# Patient Record
Sex: FEMALE | Race: WHITE | NOT HISPANIC OR LATINO | Employment: FULL TIME | URBAN - METROPOLITAN AREA
[De-identification: names, ages, dates, MRNs, and addresses within clinical notes are randomized per-mention and may not be internally consistent; named-entity substitution may affect disease eponyms.]

---

## 2019-01-09 ENCOUNTER — APPOINTMENT (EMERGENCY)
Dept: RADIOLOGY | Facility: HOSPITAL | Age: 24
End: 2019-01-09
Payer: COMMERCIAL

## 2019-01-09 ENCOUNTER — HOSPITAL ENCOUNTER (EMERGENCY)
Facility: HOSPITAL | Age: 24
Discharge: HOME | End: 2019-01-09
Attending: EMERGENCY MEDICINE
Payer: COMMERCIAL

## 2019-01-09 VITALS
OXYGEN SATURATION: 96 % | RESPIRATION RATE: 18 BRPM | HEART RATE: 87 BPM | SYSTOLIC BLOOD PRESSURE: 115 MMHG | DIASTOLIC BLOOD PRESSURE: 74 MMHG | WEIGHT: 194 LBS | TEMPERATURE: 98.2 F

## 2019-01-09 DIAGNOSIS — R55 NEAR SYNCOPE: ICD-10-CM

## 2019-01-09 DIAGNOSIS — R42 LIGHTHEADED: Primary | ICD-10-CM

## 2019-01-09 LAB
ANION GAP SERPL CALCULATED.3IONS-SCNC: 12 MMOL/L (ref 4–13)
BASOPHILS # BLD MANUAL: 0.09 THOUSAND/UL (ref 0–0.1)
BASOPHILS NFR MAR MANUAL: 1 % (ref 0–1)
BILIRUB UR QL STRIP: NEGATIVE
BUN SERPL-MCNC: 10 MG/DL (ref 5–25)
CALCIUM SERPL-MCNC: 8.9 MG/DL (ref 8.3–10.1)
CHLORIDE SERPL-SCNC: 99 MMOL/L (ref 100–108)
CLARITY UR: NORMAL
CO2 SERPL-SCNC: 25 MMOL/L (ref 21–32)
COLOR UR: YELLOW
CREAT SERPL-MCNC: 0.81 MG/DL (ref 0.6–1.3)
EOSINOPHIL # BLD MANUAL: 0 THOUSAND/UL (ref 0–0.4)
EOSINOPHIL NFR BLD MANUAL: 0 % (ref 0–6)
ERYTHROCYTE [DISTWIDTH] IN BLOOD BY AUTOMATED COUNT: 13 % (ref 11.6–15.1)
EXT PREG TEST URINE: NEGATIVE
GFR SERPL CREATININE-BSD FRML MDRD: 102 ML/MIN/1.73SQ M
GLUCOSE SERPL-MCNC: 106 MG/DL (ref 65–140)
GLUCOSE UR STRIP-MCNC: NEGATIVE MG/DL
HCT VFR BLD AUTO: 45.5 % (ref 34.8–46.1)
HGB BLD-MCNC: 14.4 G/DL (ref 11.5–15.4)
HGB UR QL STRIP.AUTO: NEGATIVE
KETONES UR STRIP-MCNC: NEGATIVE MG/DL
LEUKOCYTE ESTERASE UR QL STRIP: NEGATIVE
LYMPHOCYTES # BLD AUTO: 2.3 THOUSAND/UL (ref 0.6–4.47)
LYMPHOCYTES # BLD AUTO: 27 % (ref 14–44)
MCH RBC QN AUTO: 27.6 PG (ref 26.8–34.3)
MCHC RBC AUTO-ENTMCNC: 31.6 G/DL (ref 31.4–37.4)
MCV RBC AUTO: 87 FL (ref 82–98)
MONOCYTES # BLD AUTO: 0.77 THOUSAND/UL (ref 0–1.22)
MONOCYTES NFR BLD: 9 % (ref 4–12)
NEUTROPHILS # BLD MANUAL: 5.36 THOUSAND/UL (ref 1.85–7.62)
NEUTS BAND NFR BLD MANUAL: 5 % (ref 0–8)
NEUTS SEG NFR BLD AUTO: 58 % (ref 43–75)
NITRITE UR QL STRIP: NEGATIVE
NRBC BLD AUTO-RTO: 0 /100 WBCS
PH UR STRIP.AUTO: 7 [PH] (ref 5–9)
PLATELET # BLD AUTO: 340 THOUSANDS/UL (ref 149–390)
PLATELET BLD QL SMEAR: ADEQUATE
PMV BLD AUTO: 8.8 FL (ref 8.9–12.7)
POTASSIUM SERPL-SCNC: 3.3 MMOL/L (ref 3.5–5.3)
PROT UR STRIP-MCNC: NEGATIVE MG/DL
RBC # BLD AUTO: 5.21 MILLION/UL (ref 3.81–5.12)
RBC MORPH BLD: NORMAL
SODIUM SERPL-SCNC: 136 MMOL/L (ref 136–145)
SP GR UR STRIP.AUTO: 1.02 (ref 1–1.03)
TOTAL CELLS COUNTED SPEC: 100
TOXIC GRANULES BLD QL SMEAR: PRESENT
UROBILINOGEN UR QL STRIP.AUTO: 0.2 E.U./DL
WBC # BLD AUTO: 8.5 THOUSAND/UL (ref 4.31–10.16)

## 2019-01-09 PROCEDURE — 93005 ELECTROCARDIOGRAM TRACING: CPT

## 2019-01-09 PROCEDURE — 96360 HYDRATION IV INFUSION INIT: CPT

## 2019-01-09 PROCEDURE — 99284 EMERGENCY DEPT VISIT MOD MDM: CPT

## 2019-01-09 PROCEDURE — 85007 BL SMEAR W/DIFF WBC COUNT: CPT | Performed by: EMERGENCY MEDICINE

## 2019-01-09 PROCEDURE — 81003 URINALYSIS AUTO W/O SCOPE: CPT | Performed by: EMERGENCY MEDICINE

## 2019-01-09 PROCEDURE — 85027 COMPLETE CBC AUTOMATED: CPT | Performed by: EMERGENCY MEDICINE

## 2019-01-09 PROCEDURE — 36415 COLL VENOUS BLD VENIPUNCTURE: CPT | Performed by: EMERGENCY MEDICINE

## 2019-01-09 PROCEDURE — 71045 X-RAY EXAM CHEST 1 VIEW: CPT

## 2019-01-09 PROCEDURE — 81025 URINE PREGNANCY TEST: CPT | Performed by: EMERGENCY MEDICINE

## 2019-01-09 PROCEDURE — 80048 BASIC METABOLIC PNL TOTAL CA: CPT | Performed by: EMERGENCY MEDICINE

## 2019-01-09 RX ORDER — KETOROLAC TROMETHAMINE 30 MG/ML
15 INJECTION, SOLUTION INTRAMUSCULAR; INTRAVENOUS ONCE
Status: DISCONTINUED | OUTPATIENT
Start: 2019-01-09 | End: 2019-01-09

## 2019-01-09 RX ORDER — ACETAMINOPHEN 325 MG/1
650 TABLET ORAL ONCE
Status: DISCONTINUED | OUTPATIENT
Start: 2019-01-09 | End: 2019-01-09

## 2019-01-09 RX ORDER — ACETAMINOPHEN 160 MG/5ML
650 SUSPENSION, ORAL (FINAL DOSE FORM) ORAL ONCE
Status: COMPLETED | OUTPATIENT
Start: 2019-01-09 | End: 2019-01-09

## 2019-01-09 RX ADMIN — SODIUM CHLORIDE 1000 ML: 0.9 INJECTION, SOLUTION INTRAVENOUS at 18:28

## 2019-01-09 RX ADMIN — ACETAMINOPHEN 650 MG: 160 SUSPENSION ORAL at 18:47

## 2019-01-09 NOTE — ED PROCEDURE NOTE
PROCEDURE  ECG 12 Lead Documentation  Date/Time: 1/9/2019 6:58 PM  Performed by: Fawad Sullivan by: Antoine Hua     ECG reviewed by me, the ED Provider: yes    Patient location:  ED  Interpretation:     Interpretation: normal    Rate:     ECG rate:  80    ECG rate assessment: normal    Rhythm:     Rhythm: sinus rhythm    Ectopy:     Ectopy: none    Conduction:     Conduction: normal    ST segments:     ST segments:  Normal  T waves:     T waves: normal           Sid Fritz DO  01/09/19 1670

## 2019-01-10 NOTE — DISCHARGE INSTRUCTIONS
Lightheadedness   AMBULATORY CARE:   Lightheadedness  is the feeling that you may faint, but you do not  Your heartbeat may be fast or feel like it flutters  Lightheadedness may occur when you take certain medicines, such as medicine to lower your blood pressure  Dehydration, low sodium, low blood sugar, an abnormal heart rhythm, and anxiety are other common causes  Seek care immediately if:   · You have sudden chest pain  · You have trouble breathing or shortness of breath  · You have vision changes, are sweating, and have nausea while you are sitting or lying down  · You feel flushed and your heart is fluttering  · You pass out  Contact your healthcare provider if:   · You feel lightheaded often  · Your heart beats faster or slower than usual      · You have questions or concerns about your condition or care  Manage your symptoms:  Talk with your healthcare provider about these and other ways to manage your symptoms:  · Lie down  when you feel lightheaded, your throat gets tight, or your vision changes  Raise your legs above the level of your heart  · Stand up slowly  Sit on the side of the bed or couch for a few minutes before you stand up  · Take slow, deep breaths when you feel lightheaded  This can help decrease the feeling that you might faint  · Ask if you need to avoid hot baths and saunas  These may make your symptoms worse  Watch for signs of low blood sugar: These include hunger, nervousness, sweating, and fast or fluttery heartbeats  Talk with your healthcare provider about ways to keep your blood sugar level steady  Check your blood pressure often:  You should do this especially if you take medicine to lower your blood pressure  Check your blood pressure when you are lying down and when you are standing  Ask how often to check during the day  Keep a record of your blood pressure numbers   Your healthcare provider may use the record to help plan your treatment  Keep a record of your lightheadedness episodes:  Include your symptoms and your activity before and after the episode  The record can help your healthcare provider find the cause of your lightheadedness and help you manage episodes  Follow up with your healthcare provider as directed: You may need more tests to help find the cause of your lightheadedness  The tests will help healthcare providers plan the best treatment for you  Write down your questions so you remember to ask them during your visits  © 2017 [employer]  Information is for End User's use only and may not be sold, redistributed or otherwise used for commercial purposes  All illustrations and images included in CareNotes® are the copyrighted property of A [employer]  or [employer]  The above information is an  only  It is not intended as medical advice for individual conditions or treatments  Talk to your doctor, nurse or pharmacist before following any medical regimen to see if it is safe and effective for you  Near Syncope   WHAT YOU NEED TO KNOW:   Near syncope, also called presyncope, is the feeling that you may faint (lose consciousness), but you do not  You can control some health conditions that cause near syncope  Your healthcare providers can help you create a plan to manage near syncope and prevent episodes  DISCHARGE INSTRUCTIONS:   Seek care immediately if:   · You have sudden chest pain  · You have trouble breathing or shortness of breath  · You have vision changes, are sweating, and have nausea while you are sitting or lying down  · You feel dizzy or flushed and your heart is fluttering  · You lose consciousness  · You cannot use your arm, hand, foot, or leg, or it feels weak  · You have trouble speaking or understanding others when they speak  Contact your healthcare provider if:   · You have new or worsening symptoms      · Your heart beats faster or slower than usual      · You have questions or concerns about your condition or care  Follow up with your healthcare provider as directed: You may need more tests to help find the cause of your near syncope episodes  The tests will help healthcare providers plan the best treatment for you  Write down your questions so you remember to ask them during your visits  Manage near syncope:   · Sit or lie down when needed  This includes when you feel dizzy, your throat is getting tight, and your vision changes  Raise your legs above the level of your heart  · Take slow, deep breaths if you start to breathe faster with anxiety or fear  This can help decrease dizziness and the feeling that you might faint  · Keep a record of your near syncope episodes  Include your symptoms and your activity before and after the episode  The record can help your healthcare provider find the cause of your near syncope and help you manage episodes  Prevent a near syncope episode:   · Move slowly and let yourself get used to one position before you move to another position  This is very important when you change from a lying or sitting position to a standing position  Take some deep breaths before you stand up from a lying position  Stand up slowly  Sudden movements may cause a fainting spell  Sit on the side of the bed or couch for a few minutes before you stand up  If you are on bedrest, try to be upright for about 2 hours each day, or as directed  Do not lock your legs if you are standing for a long period of time  Move your legs and bend your knees to keep blood flowing  · Follow your healthcare provider's recommendations  Your provider may  recommend that you drink more liquids to prevent dehydration  You may also need to have more salt to keep your blood pressure from dropping too low and causing syncope  Your provider will tell you how much liquid and sodium to have each day  · Watch for signs of low blood sugar  These include hunger, nervousness, sweating, and fast or fluttery heartbeats  Talk with your healthcare provider about ways to keep your blood sugar level steady  · Check your blood pressure often  This is important if you take medicine to lower your blood pressure  Check your blood pressure when you are lying down and when you are standing  Ask how often to check during the day  Keep a record of your blood pressure numbers  Your healthcare provider may use the record to help plan your treatment  · Do not strain if you are constipated  You may faint if you strain to have a bowel movement  Walking is the best way to get your bowels moving  Eat foods high in fiber to make it easier to have a bowel movement  Good examples are high-fiber cereals, beans, vegetables, and whole-grain breads  Prune juice may help make bowel movements softer  · Do not exercise outside on a hot day  The combination of physical activity and heat can lead to dehydration  This can cause syncope  © 2017 [employer]  Information is for End User's use only and may not be sold, redistributed or otherwise used for commercial purposes  All illustrations and images included in CareNotes® are the copyrighted property of A [employer]  or Faizan Potter  The above information is an  only  It is not intended as medical advice for individual conditions or treatments  Talk to your doctor, nurse or pharmacist before following any medical regimen to see if it is safe and effective for you

## 2019-01-11 NOTE — ED PROVIDER NOTES
History  Chief Complaint   Patient presents with    Dizziness     being tx  for bronchitis  states she was driving and she became dizzy and thought she was going to  pass out     Patient presents after near syncopal episode  States she was driving and became very lightheaded and felt as if she was going to pass out  States room was not spinning  Pulled over and sister came to take her to hospital  Starting to feel better  States being treated with Zithromax for bronchitis  No LOC  History provided by:  Patient   used: No    Dizziness       None       History reviewed  No pertinent past medical history  Past Surgical History:   Procedure Laterality Date    FRACTURE SURGERY         History reviewed  No pertinent family history  I have reviewed and agree with the history as documented  Social History   Substance Use Topics    Smoking status: Never Smoker    Smokeless tobacco: Never Used    Alcohol use No        Review of Systems   Neurological: Positive for dizziness  All other systems reviewed and are negative  Physical Exam  Physical Exam   Constitutional: She is oriented to person, place, and time  Patient crying visibly upset    HENT:   Mouth/Throat: Oropharynx is clear and moist    Eyes: Pupils are equal, round, and reactive to light  EOM are normal    Neck: Normal range of motion  Neck supple  Cardiovascular: Normal rate, regular rhythm and intact distal pulses  Pulmonary/Chest: Effort normal and breath sounds normal  No respiratory distress  Abdominal: Soft  There is no tenderness  Musculoskeletal: Normal range of motion  Neurological: She is alert and oriented to person, place, and time  No cranial nerve deficit or sensory deficit  She exhibits normal muscle tone  Coordination normal    Skin: Capillary refill takes less than 2 seconds  She is not diaphoretic  Nursing note and vitals reviewed        Vital Signs  ED Triage Vitals   Temperature Pulse Respirations Blood Pressure SpO2   01/09/19 1820 01/09/19 1820 01/09/19 1820 01/09/19 1820 01/09/19 1820   98 2 °F (36 8 °C) 84 22 136/84 96 %      Temp src Heart Rate Source Patient Position - Orthostatic VS BP Location FiO2 (%)   -- 01/09/19 1913 01/09/19 1913 01/09/19 1913 --    Monitor Lying Right arm       Pain Score       01/09/19 1820       7           Vitals:    01/09/19 1820 01/09/19 1913   BP: 136/84 115/74   Pulse: 84 87   Patient Position - Orthostatic VS:  Lying       Visual Acuity      ED Medications  Medications   sodium chloride 0 9 % bolus 1,000 mL (0 mL Intravenous Stopped 1/9/19 1950)   acetaminophen (TYLENOL) oral suspension 650 mg (650 mg Oral Given 1/9/19 1847)       Diagnostic Studies  Results Reviewed     Procedure Component Value Units Date/Time    CBC and differential [[id number]]  (Abnormal) Collected:  01/09/19 1842    Lab Status:  Final result Specimen:  Blood from Arm, Right Updated:  01/09/19 1913     WBC 8 50 Thousand/uL      RBC 5 21 (H) Million/uL      Hemoglobin 14 4 g/dL      Hematocrit 45 5 %      MCV 87 fL      MCH 27 6 pg      MCHC 31 6 g/dL      RDW 13 0 %      MPV 8 8 (L) fL      Platelets 230 Thousands/uL      nRBC 0 /100 WBCs     Narrative: This is an appended report  These results have been appended to a previously verified report      Basic metabolic panel [[id number]]  (Abnormal) Collected:  01/09/19 1842    Lab Status:  Final result Specimen:  Blood from Arm, Right Updated:  01/09/19 1859     Sodium 136 mmol/L      Potassium 3 3 (L) mmol/L      Chloride 99 (L) mmol/L      CO2 25 mmol/L      ANION GAP 12 mmol/L      BUN 10 mg/dL      Creatinine 0 81 mg/dL      Glucose 106 mg/dL      Calcium 8 9 mg/dL      eGFR 102 ml/min/1 73sq m     Narrative:         National Kidney Disease Education Program recommendations are as follows:  GFR calculation is accurate only with a steady state creatinine  Chronic Kidney disease less than 60 ml/min/1 73 sq  meters  Kidney failure less than 15 ml/min/1 73 sq  meters  UA w Reflex to Microscopic [[id number]] Collected:  01/09/19 1843    Lab Status:  Final result Specimen:  Urine from Urine, Clean Catch Updated:  01/09/19 1850     Color, UA Yellow     Clarity, UA Slightly Cloudy     Specific [place], UA 1 020     pH, UA 7 0     Leukocytes, UA Negative     Nitrite, UA Negative     Protein, UA Negative mg/dl      Glucose, UA Negative mg/dl      Ketones, UA Negative mg/dl      Urobilinogen, UA 0 2 E U /dl      Bilirubin, UA Negative     Blood, UA Negative    POCT pregnancy, urine [[id number]]  (Normal) Resulted:  01/09/19 1846    Lab Status:  Final result Updated:  01/09/19 1846     EXT PREG TEST UR (Ref: Negative) negative                 XR chest 1 view portable   Final Result by Jazmin Holm MD (01/10 9494)      No active pulmonary disease on examination which is somewhat limited secondary to low lung volumes  Workstation performed: [id number]                    Procedures  Procedures       Phone Contacts  ED Phone Contact    ED Course                               MDM  Number of Diagnoses or Management Options  Lightheaded:   Near syncope:   Diagnosis management comments: Pulse ox 96% on RA indicating adequate oxygenation  CXR: NAD as read by me    Patient felt better on re-exam and was discharged home to follow up as outpatient          Amount and/or Complexity of Data Reviewed  Clinical lab tests: ordered and reviewed  Tests in the radiology section of CPT®: ordered and reviewed  Decide to obtain previous medical records or to obtain history from someone other than the patient: yes  Review and summarize past medical records: yes  Independent visualization of images, tracings, or specimens: yes    Patient Progress  Patient progress: stable    CritCare Time    Disposition  Final diagnoses:   Lightheaded   Near syncope     Time reflects when diagnosis was documented in both MDM as applicable and the Disposition within this note Time User Action Codes Description Comment    1/9/2019  7:35 PM Emma Ledbetter Add [R42] Lightheaded     1/9/2019  7:35 PM Luis Jean-Baptiste Add [R55] Near syncope       ED Disposition     ED Disposition Condition Comment    Discharge  Juanviki Vanda discharge to home/self care  Condition at discharge: stable      Follow-up Information     Follow up With Specialties Details [facility] [address]  In 3 days  [phone]            There are no discharge medications for this patient  No discharge procedures on file      ED Provider  Electronically Signed by           Funmilayo Malave DO  01/10/19 9128

## 2019-01-13 LAB
ATRIAL RATE: 80 BPM
P AXIS: 24 DEGREES
PR INTERVAL: 148 MS
QRS AXIS: 6 DEGREES
QRSD INTERVAL: 84 MS
QT INTERVAL: 378 MS
QTC INTERVAL: 435 MS
T WAVE AXIS: 31 DEGREES
VENTRICULAR RATE: 80 BPM

## 2019-01-13 PROCEDURE — 93010 ELECTROCARDIOGRAM REPORT: CPT | Performed by: INTERNAL MEDICINE

## 2019-05-02 ENCOUNTER — HOSPITAL ENCOUNTER (EMERGENCY)
Facility: HOSPITAL | Age: 24
Discharge: HOME | End: 2019-05-02
Attending: EMERGENCY MEDICINE | Admitting: EMERGENCY MEDICINE

## 2019-05-02 VITALS
WEIGHT: 195 LBS | HEART RATE: 97 BPM | OXYGEN SATURATION: 100 % | RESPIRATION RATE: 18 BRPM | DIASTOLIC BLOOD PRESSURE: 74 MMHG | TEMPERATURE: 99.2 F | SYSTOLIC BLOOD PRESSURE: 142 MMHG

## 2019-05-02 DIAGNOSIS — L30.9 DERMATITIS: Primary | ICD-10-CM

## 2019-05-02 PROCEDURE — 99282 EMERGENCY DEPT VISIT SF MDM: CPT

## 2019-05-02 RX ORDER — METHYLPREDNISOLONE 4 MG/1
TABLET ORAL
Qty: 21 TABLET | Refills: 0 | Status: SHIPPED | OUTPATIENT
Start: 2019-05-02 | End: 2019-08-14 | Stop reason: ALTCHOICE

## 2019-05-02 RX ORDER — CETIRIZINE HYDROCHLORIDE 10 MG/1
10 TABLET ORAL DAILY
Qty: 30 TABLET | Refills: 0 | Status: SHIPPED | OUTPATIENT
Start: 2019-05-02 | End: 2019-08-14 | Stop reason: ALTCHOICE

## 2019-05-02 RX ADMIN — PREDNISONE 50 MG: 20 TABLET ORAL at 15:34

## 2019-05-03 ENCOUNTER — OFFICE VISIT (OUTPATIENT)
Dept: FAMILY MEDICINE CLINIC | Facility: CLINIC | Age: 24
End: 2019-05-03

## 2019-05-03 VITALS
DIASTOLIC BLOOD PRESSURE: 70 MMHG | TEMPERATURE: 98 F | HEART RATE: 60 BPM | WEIGHT: 203 LBS | SYSTOLIC BLOOD PRESSURE: 112 MMHG | RESPIRATION RATE: 18 BRPM | OXYGEN SATURATION: 98 %

## 2019-05-03 DIAGNOSIS — R21 FACIAL RASH: Primary | ICD-10-CM

## 2019-05-03 PROCEDURE — 99213 OFFICE O/P EST LOW 20 MIN: CPT | Performed by: FAMILY MEDICINE

## 2019-05-03 RX ORDER — LORATADINE 10 MG/1
10 TABLET ORAL DAILY
COMMUNITY
End: 2019-08-14 | Stop reason: ALTCHOICE

## 2019-08-14 ENCOUNTER — OFFICE VISIT (OUTPATIENT)
Dept: FAMILY MEDICINE CLINIC | Facility: CLINIC | Age: 24
End: 2019-08-14

## 2019-08-14 VITALS
SYSTOLIC BLOOD PRESSURE: 112 MMHG | HEART RATE: 84 BPM | HEIGHT: 65 IN | RESPIRATION RATE: 16 BRPM | TEMPERATURE: 98.8 F | DIASTOLIC BLOOD PRESSURE: 80 MMHG | WEIGHT: 199 LBS | BODY MASS INDEX: 33.15 KG/M2

## 2019-08-14 DIAGNOSIS — R05.9 COUGH: ICD-10-CM

## 2019-08-14 DIAGNOSIS — R10.32 LLQ PAIN: Primary | ICD-10-CM

## 2019-08-14 DIAGNOSIS — Z13.6 SCREENING FOR CARDIOVASCULAR, RESPIRATORY, AND GENITOURINARY DISEASES: ICD-10-CM

## 2019-08-14 DIAGNOSIS — Z13.1 SCREENING FOR DIABETES MELLITUS (DM): ICD-10-CM

## 2019-08-14 DIAGNOSIS — Z13.89 SCREENING FOR CARDIOVASCULAR, RESPIRATORY, AND GENITOURINARY DISEASES: ICD-10-CM

## 2019-08-14 DIAGNOSIS — J30.9 ALLERGIC RHINITIS, UNSPECIFIED SEASONALITY, UNSPECIFIED TRIGGER: ICD-10-CM

## 2019-08-14 DIAGNOSIS — Z13.83 SCREENING FOR CARDIOVASCULAR, RESPIRATORY, AND GENITOURINARY DISEASES: ICD-10-CM

## 2019-08-14 PROCEDURE — 99214 OFFICE O/P EST MOD 30 MIN: CPT | Performed by: FAMILY MEDICINE

## 2019-08-14 PROCEDURE — 3725F SCREEN DEPRESSION PERFORMED: CPT | Performed by: FAMILY MEDICINE

## 2019-08-14 NOTE — PROGRESS NOTES
Assessment/Plan:    No problem-specific Assessment & Plan notes found for this encounter  Could recommend ENT for chronic cough after seeing Allergist, appt pending  BMI Counseling: Body mass index is 33 12 kg/m²  Discussed the patient's BMI with her  The BMI is above average  BMI counseling and education was provided to the patient  Nutrition recommendations include decreasing overall calorie intake  Suggest daily h1b and possibly INS or LTRA in future  Possible gerd trigger, pt to observe for gerd sx    llq pain  Around menopause  First occurrence last cycle  Consider endometriosis or adnexal pathology  US ordered, schedule around next period if possible  Gyn eval pending    phm labs offered  F/u 1m     Diagnoses and all orders for this visit:    LLQ pain  -     US abdomen and pelvis with transvaginal; Future    Allergic rhinitis, unspecified seasonality, unspecified trigger  -     Ambulatory Referral to Otolaryngology; Future    Screening for cardiovascular, respiratory, and genitourinary diseases  -     Lipid Panel with Direct LDL reflex; Future  -     Lipid Panel with Direct LDL reflex    Screening for diabetes mellitus (DM)  -     Comprehensive metabolic panel; Future  -     Comprehensive metabolic panel    Cough  -     Ambulatory Referral to Otolaryngology; Future      Return in about 1 month (around 9/14/2019) for Recheck  Subjective:      Patient ID: Myriam Mari is a 21 y o  female      Chief Complaint   Patient presents with    New patient     establish care    Cough     persistent cough- started last august    Cramping after period     Left side       HPI    New pt here, been to [facility]    On/off cough 1y  Been to pcp in [place], dx with bronchitis  Cough for 1w then gone for a month at times  Mucus is yellow to clear at times  Hx of seasonal allergies  Most recent episode of cough past 2d  No fever  Non productive  Used to take alavert in past  No sprays in past    Nonsmoker    May wheeze  Cat allergy, lives with cats in sisters house  Going to see an allergist today in [facility], Dr Obed Brenner    Rash from peanut butter, facial    No hx anaphylaxis in past    Has gyn appt in September  No prior abnormal pap  No bcp  Sexually active  No condom  Not     No gerd sx per pt    crampy pain  llq  Around time of periods  Before but sometimes after  No blood in stool  Periods not heavy    The following portions of the patient's history were reviewed and updated as appropriate: allergies, current medications, past family history, past medical history, past social history, past surgical history and problem list     Review of Systems   Constitutional: Negative for chills and fever  Gastrointestinal: Positive for abdominal distention  Negative for blood in stool  Genitourinary: Negative for dysuria  No current outpatient medications on file  No current facility-administered medications for this visit  Objective:    /80   Pulse 84   Temp 98 8 °F (37 1 °C)   Resp 16   Ht 5' 5" (1 651 m)   Wt 90 3 kg (199 lb)   LMP 07/27/2019   BMI 33 12 kg/m²        Physical Exam   Constitutional: She appears well-developed  No distress  HENT:   Head: Normocephalic  Right Ear: External ear normal    Left Ear: External ear normal    Mouth/Throat: Oropharynx is clear and moist  No oropharyngeal exudate  Eyes: Conjunctivae are normal  No scleral icterus  Neck: Neck supple  Cardiovascular: Normal rate, regular rhythm, normal heart sounds and intact distal pulses  Pulmonary/Chest: Effort normal  No respiratory distress  She has no wheezes  She has no rales  Abdominal: Soft  Bowel sounds are normal  She exhibits no distension and no mass  There is tenderness  There is no rebound and no guarding  Musculoskeletal: She exhibits no edema or deformity  Lymphadenopathy:     She has no cervical adenopathy  Neurological: She is alert  She exhibits normal muscle tone     Skin: Skin is warm and dry  No rash noted  No pallor  Psychiatric: Her behavior is normal  Thought content normal    Nursing note and vitals reviewed               Johnie Ward DO

## 2019-08-22 ENCOUNTER — OFFICE VISIT (OUTPATIENT)
Dept: FAMILY MEDICINE CLINIC | Facility: CLINIC | Age: 24
End: 2019-08-22

## 2019-08-22 VITALS
HEART RATE: 60 BPM | TEMPERATURE: 98.9 F | SYSTOLIC BLOOD PRESSURE: 122 MMHG | HEIGHT: 65 IN | WEIGHT: 203 LBS | BODY MASS INDEX: 33.82 KG/M2 | DIASTOLIC BLOOD PRESSURE: 84 MMHG | RESPIRATION RATE: 16 BRPM

## 2019-08-22 DIAGNOSIS — J35.8 TONSILLOLITH: Primary | ICD-10-CM

## 2019-08-22 PROCEDURE — 99213 OFFICE O/P EST LOW 20 MIN: CPT | Performed by: FAMILY MEDICINE

## 2019-08-22 PROCEDURE — 1036F TOBACCO NON-USER: CPT | Performed by: FAMILY MEDICINE

## 2019-08-22 PROCEDURE — 3008F BODY MASS INDEX DOCD: CPT | Performed by: FAMILY MEDICINE

## 2019-08-22 NOTE — PROGRESS NOTES
Assessment/Plan:   Diagnoses and all orders for this visit:    Tonsilloliths  - Reviewed supportive care with patient including salt water gargles, throat lozenges, warm teas  Rapid strep done in office today is negative  Pt does have an appointment with ENT scheduled mid September  Did let her know that symptoms should resolve on their own  Return if development of fevers, chills, worsening pain, difficulty swallowing  Subjective:      Patient ID: Tim Melo is a 21 y o  female  Sore Throat    This is a new problem  The current episode started yesterday  The problem has been unchanged  The pain is worse on the right side  There has been no fever  The pain is at a severity of 3/10  Associated symptoms include coughing  Pertinent negatives include no congestion, diarrhea, drooling, ear discharge, ear pain, headaches, hoarse voice, plugged ear sensation, neck pain, shortness of breath, stridor, swollen glands, trouble swallowing or vomiting  She has had no exposure to strep or mono  She has tried nothing for the symptoms  The following portions of the patient's history were reviewed and updated as appropriate: allergies, current medications, past family history, past medical history, past social history, past surgical history and problem list     Review of Systems   HENT: Positive for sore throat  Negative for congestion, drooling, ear discharge, ear pain, hoarse voice and trouble swallowing  Respiratory: Positive for cough  Negative for shortness of breath and stridor  Gastrointestinal: Negative for diarrhea and vomiting  Musculoskeletal: Negative for neck pain  Neurological: Negative for headaches  Objective:      /84   Pulse 60   Temp 98 9 °F (37 2 °C)   Resp 16   Ht 5' 5" (1 651 m)   Wt 92 1 kg (203 lb)   LMP 07/27/2019 (Exact Date)   BMI 33 78 kg/m²          Physical Exam   Constitutional: She is oriented to person, place, and time   She appears well-developed and well-nourished  Non-toxic appearance  She does not appear ill  No distress  HENT:   Head: Normocephalic and atraumatic  Right Ear: Hearing, tympanic membrane and ear canal normal    Left Ear: Hearing, tympanic membrane and ear canal normal    Mouth/Throat: Uvula is midline, oropharynx is clear and moist and mucous membranes are normal  Tonsils are 0 on the right  Tonsils are 0 on the left  3 calcifications on right tonsil resembling tonsilloliths  Neck: Normal range of motion  Neck supple  No thyromegaly present  Lymphadenopathy:     She has no cervical adenopathy  Neurological: She is alert and oriented to person, place, and time  Psychiatric: She has a normal mood and affect   Her behavior is normal

## 2019-08-28 ENCOUNTER — HOSPITAL ENCOUNTER (OUTPATIENT)
Dept: RADIOLOGY | Facility: HOSPITAL | Age: 24
Discharge: HOME | End: 2019-08-28

## 2019-08-28 DIAGNOSIS — R10.32 LLQ PAIN: ICD-10-CM

## 2019-08-28 PROCEDURE — 76856 US EXAM PELVIC COMPLETE: CPT

## 2019-08-28 PROCEDURE — 76830 TRANSVAGINAL US NON-OB: CPT

## 2019-08-29 ENCOUNTER — TELEPHONE (OUTPATIENT)
Dept: FAMILY MEDICINE CLINIC | Facility: CLINIC | Age: 24
End: 2019-08-29

## 2019-08-29 DIAGNOSIS — R93.5 ABNORMAL ULTRASOUND OF OVARY: Primary | ICD-10-CM

## 2019-09-09 NOTE — PROGRESS NOTES
Assessment/Plan:     Reviewed pelvic US with patient  Recommend follow up [address] as planned  Return to office for well woman exam  Start daily MVI with folic acid or prenatal vitamin to decrease risk of neural tube defect in a pregnancy  If birth control is desired, return to the office  Diagnoses and all orders for this visit:    Cyst of left ovary              Subjective:      Patient ID: Cruz Duarte is a 21 y o  female  Cruz Duarte is a 21 y o  female who is here today as a new patient  for a problem visit  She started with "stomach pain" that occurred with her menses in July and lasted x 1 weeks  She followed with her PCP who ordered a pelvic US  Pelvic US on 8/28/19 showed 1 5 cm heterogeneous isoechoic nodule or complex cyst in the left ovary  Recommmend follow up US in 6-12 weeks  She has this schedule for 10/11/19  Monthly menses x 4-5 days with mod flow  Menses is acceptable  Denies pelvic pain today  Worsens with menses and rates pain 7/10  Tylenol is effective  Cruz Duarte is sexually active with male partner of 2 years  No condom or any birth control used  Pregnancy acceptable  Last gyn visit was 2017  The following portions of the patient's history were reviewed and updated as appropriate: allergies, current medications, past family history, past medical history, past social history, past surgical history and problem list     Review of Systems   Constitutional: Negative  HENT: Negative for sore throat and trouble swallowing  Gastrointestinal: Negative for abdominal pain, constipation, diarrhea, nausea and vomiting  Genitourinary: Positive for menstrual problem (intermittent dysmenorrhea) and pelvic pain (intermittent; none now)  Negative for decreased urine volume, difficulty urinating, dyspareunia, dysuria, frequency, genital sores, urgency, vaginal bleeding, vaginal discharge and vaginal pain  Musculoskeletal: Negative for arthralgias and myalgias  Skin: Negative  Hematological: Negative for adenopathy  Psychiatric/Behavioral: Negative  All other systems reviewed and are negative  Objective:      /62 (BP Location: Left arm, Patient Position: Sitting, Cuff Size: Standard)   Pulse 64   Ht 5' 5" (1 651 m)   Wt 90 kg (198 lb 6 4 oz)   LMP 09/02/2019   BMI 33 02 kg/m²          Physical Exam   Constitutional: She is oriented to person, place, and time  She appears well-developed and well-nourished  Genitourinary: Vagina normal and uterus normal  Rectal exam shows no external hemorrhoid  Pelvic exam was performed with patient supine  No labial fusion  There is no rash, tenderness, lesion or injury on the right labia  There is no rash, tenderness, lesion or injury on the left labia  Cervix exhibits no motion tenderness, no discharge and no friability  Right adnexum displays no mass, no tenderness and no fullness  Left adnexum displays no mass, no tenderness and no fullness  Musculoskeletal: Normal range of motion  Lymphadenopathy: No inguinal adenopathy noted on the right or left side  Right: No inguinal adenopathy present  Left: No inguinal adenopathy present  Neurological: She is alert and oriented to person, place, and time  Skin: Skin is warm and dry  Psychiatric: She has a normal mood and affect  Nursing note and vitals reviewed

## 2019-09-10 ENCOUNTER — OFFICE VISIT (OUTPATIENT)
Dept: OBGYN CLINIC | Facility: CLINIC | Age: 24
End: 2019-09-10
Payer: COMMERCIAL

## 2019-09-10 VITALS
BODY MASS INDEX: 33.05 KG/M2 | HEART RATE: 64 BPM | DIASTOLIC BLOOD PRESSURE: 62 MMHG | SYSTOLIC BLOOD PRESSURE: 100 MMHG | WEIGHT: 198.4 LBS | HEIGHT: 65 IN

## 2019-09-10 DIAGNOSIS — N83.202 CYST OF LEFT OVARY: Primary | ICD-10-CM

## 2019-09-10 PROCEDURE — 99203 OFFICE O/P NEW LOW 30 MIN: CPT | Performed by: NURSE PRACTITIONER

## 2019-09-10 NOTE — PATIENT INSTRUCTIONS
Reviewed pelvic US with patient  Recommend follow up [address] as planned  Return to office for well woman exam  Start daily MVI with folic acid or prenatal vitamin to decrease risk of neural tube defect in a pregnancy  If birth control is desired, return to the office

## 2019-09-25 ENCOUNTER — TELEPHONE (OUTPATIENT)
Dept: FAMILY MEDICINE CLINIC | Facility: CLINIC | Age: 24
End: 2019-09-25

## 2019-09-25 ENCOUNTER — OFFICE VISIT (OUTPATIENT)
Dept: FAMILY MEDICINE CLINIC | Facility: CLINIC | Age: 24
End: 2019-09-25
Payer: COMMERCIAL

## 2019-09-25 VITALS
DIASTOLIC BLOOD PRESSURE: 70 MMHG | WEIGHT: 199 LBS | SYSTOLIC BLOOD PRESSURE: 110 MMHG | RESPIRATION RATE: 16 BRPM | HEIGHT: 65 IN | TEMPERATURE: 97.8 F | BODY MASS INDEX: 33.15 KG/M2 | HEART RATE: 56 BPM

## 2019-09-25 DIAGNOSIS — J06.9 UPPER RESPIRATORY TRACT INFECTION, UNSPECIFIED TYPE: ICD-10-CM

## 2019-09-25 DIAGNOSIS — M79.89 SWELLING OF LOWER EXTREMITY: Primary | ICD-10-CM

## 2019-09-25 DIAGNOSIS — J02.9 SORE THROAT: ICD-10-CM

## 2019-09-25 PROCEDURE — 3008F BODY MASS INDEX DOCD: CPT | Performed by: FAMILY MEDICINE

## 2019-09-25 PROCEDURE — 99213 OFFICE O/P EST LOW 20 MIN: CPT | Performed by: FAMILY MEDICINE

## 2019-09-25 RX ORDER — LIDOCAINE HYDROCHLORIDE 20 MG/ML
15 SOLUTION OROPHARYNGEAL 4 TIMES DAILY PRN
Qty: 100 ML | Refills: 0 | Status: SHIPPED | OUTPATIENT
Start: 2019-09-25 | End: 2020-02-05

## 2019-09-25 NOTE — TELEPHONE ENCOUNTER
Klein Geo called back and she stated that she think its developing to a sinus infection because she is coughing and sneezing yellow mucus  I stated that Dr Anahi Avelar is not here this evening and that when she is back in the morning she will address accordingly if the patient will need to come back or if she will e-scribe an ABX    Please advise  Jacob Sharma MA

## 2019-09-25 NOTE — PROGRESS NOTES
Assessment/Plan:     Diagnoses and all orders for this visit:    Swelling of lower extremity        - Measured both calves and lower extremities today and there is no significant swelling in either extremity  There is no pain or discoloration in lower extremities as well  Given that pt has no major risk factors at this time for DVTs (other than her father having a blood clot), and the swelling only occurs when pt is on her feet for too long during the day and disappears when she elevates her legs, will hold off on lower extremity dopplers  Did let pt know that if she develops worsening swelling, pain in her legs, SOB, chest tightness, palpitations to go to the ED or return here for further work up  Counseled on leg elevation, compression stockings and exercises for lower extremity swelling  Sore throat  -     Lidocaine Viscous HCl (XYLOCAINE) 2 % mucosal solution; Swish and spit 15 mL 4 (four) times a day as needed for mild pain    Upper respiratory tract infection, unspecified type        - Reviewed supportive care with patient including rest, staying hydrated (drink 8-10 glasses of liquids such as water, ginger ale, juice), salt water gargles, saline nasal drops, use of humidifier, frequent handwashing to prevent spread of germs, and use of over the counter decongestants, cough suppressants, Ibuprofen/Acetaminophen        Subjective:      Patient ID: Dunia Saab is a 21 y o  female  HPI  Rajesh Canales presents today for multiple issues including swelling of her right lower extremity as well as URI symptoms  Leg Swelling: Pt states that for the past week or so she has noted on and off swelling of her right foot and leg below the knee  States it occurs mostly when she is standing on her feet at work all day  The swelling goes away when she goes home and elevates her legs  Denies any pain or redness in the area   Denies any hx of blood clots, medication use, recent travel, shortness of breath, chest tightness, palpitations, leg cramps  Does have a family hx of blood clot in her father  Pt states that she does not currently have the swelling because it goes away when she is not standing on her feet all day  URI Symptoms: Pt reports 1 day hx of nasal congestion, sore throat, chills  Denies fevers  No sick contacts  The following portions of the patient's history were reviewed and updated as appropriate: allergies, current medications, past family history, past medical history, past social history, past surgical history and problem list     Review of Systems   Constitutional: Positive for activity change and chills  Negative for appetite change, diaphoresis, fatigue and fever  HENT: Positive for congestion and sore throat  Respiratory: Negative for cough, choking, chest tightness, shortness of breath and wheezing  Cardiovascular: Positive for leg swelling (on and off)  Negative for chest pain and palpitations  Gastrointestinal: Negative for abdominal distention, abdominal pain, constipation, diarrhea, nausea and vomiting  Genitourinary: Negative for difficulty urinating, dyspareunia, dysuria, enuresis, flank pain, frequency, menstrual problem, pelvic pain and urgency  Musculoskeletal: Negative for arthralgias, back pain, gait problem, joint swelling, myalgias, neck pain and neck stiffness  Skin: Negative  Neurological: Negative for dizziness, tremors, syncope, facial asymmetry, weakness, light-headedness, numbness and headaches  Psychiatric/Behavioral: Negative  Objective:      /70   Pulse 56   Temp 97 8 °F (36 6 °C)   Resp 16   Ht 5' 5" (1 651 m)   Wt 90 3 kg (199 lb)   LMP 09/02/2019   BMI 33 12 kg/m²          Physical Exam   Constitutional: She is oriented to person, place, and time  She appears well-developed and well-nourished  No distress  HENT:   Head: Normocephalic and atraumatic  Neck: Normal range of motion  Neck supple     Cardiovascular: Normal rate, regular rhythm, normal heart sounds and intact distal pulses  Exam reveals no gallop and no friction rub  No murmur heard  Pulmonary/Chest: Effort normal and breath sounds normal  No respiratory distress  She has no wheezes  She has no rales  She exhibits no tenderness  Musculoskeletal: Normal range of motion  She exhibits no edema (both calves measured, left calf slightly larger than right, but no swelling), tenderness or deformity  Neurological: She is alert and oriented to person, place, and time  Skin: Skin is warm and dry  No rash noted  She is not diaphoretic  No erythema  No pallor

## 2019-09-25 NOTE — TELEPHONE ENCOUNTER
Pt saw Dr Gabriella Sanchez today for a cold  She just called back stating she is feeling worse and thinks she has a sinus infection  Pls call her to advise

## 2019-09-26 DIAGNOSIS — J01.90 ACUTE NON-RECURRENT SINUSITIS, UNSPECIFIED LOCATION: Primary | ICD-10-CM

## 2019-09-26 RX ORDER — AZITHROMYCIN 250 MG/1
TABLET, FILM COATED ORAL
Qty: 6 TABLET | Refills: 0 | Status: SHIPPED | OUTPATIENT
Start: 2019-09-26 | End: 2019-10-01

## 2019-09-26 NOTE — TELEPHONE ENCOUNTER
I sent over azithromycin to her pharmacy  Tried calling her to let her know, but no answer  Thank you

## 2019-09-27 ENCOUNTER — TELEPHONE (OUTPATIENT)
Dept: FAMILY MEDICINE CLINIC | Facility: CLINIC | Age: 24
End: 2019-09-27

## 2019-09-27 NOTE — TELEPHONE ENCOUNTER
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

## 2019-10-07 ENCOUNTER — TELEPHONE (OUTPATIENT)
Dept: OBGYN CLINIC | Facility: CLINIC | Age: 24
End: 2019-10-07

## 2019-10-07 NOTE — TELEPHONE ENCOUNTER
Patient left message on nurse line states she has left ovarian cyst and since Wednesday she has notice pink spotting with wiping after using bathroom  RC left message for patient to call the office

## 2019-10-08 NOTE — TELEPHONE ENCOUNTER
Spoke with patient who had light spotting x 5-6 days that was self limiting  None today  Denies pelvic pain, abnormal vaginal bleeding or dysuria  No insurance currently and declined an office visit  She will reconsider if the symptoms reoccur

## 2019-11-21 ENCOUNTER — TRANSCRIBE ORDERS (OUTPATIENT)
Dept: RADIOLOGY | Facility: HOSPITAL | Age: 24
End: 2019-11-21

## 2019-11-21 ENCOUNTER — HOSPITAL ENCOUNTER (OUTPATIENT)
Dept: RADIOLOGY | Facility: HOSPITAL | Age: 24
Discharge: HOME | End: 2019-11-21
Payer: COMMERCIAL

## 2019-11-21 DIAGNOSIS — R93.5 ABNORMAL ULTRASOUND OF OVARY: ICD-10-CM

## 2019-11-21 PROCEDURE — 76856 US EXAM PELVIC COMPLETE: CPT

## 2019-11-21 PROCEDURE — 76830 TRANSVAGINAL US NON-OB: CPT

## 2019-11-26 DIAGNOSIS — R93.5 ABNORMAL ULTRASOUND OF OVARY: Primary | ICD-10-CM

## 2019-12-03 ENCOUNTER — TELEPHONE (OUTPATIENT)
Dept: HEMATOLOGY ONCOLOGY | Facility: CLINIC | Age: 24
End: 2019-12-03

## 2019-12-03 NOTE — TELEPHONE ENCOUNTER
New Patient Encounter    New Patient Intake Form   Patient Details:  Kelby Zheng  1995  [id number]    Background Information:  [address] starts by opening a telephone encounter and gathering the following information   Who is calling to schedule? If not self, relationship to patient? self   Referring Provider self   What is the diagnosis? Ovarian cyst   When was the diagnosis? 8/2019   Is patient aware of diagnosis? Yes   Reason for visit? NP DX   Have you had any testing done? If so: when, where? Yes   Are records in [facility]? yes   Was the patient told to bring a disk? no   Scheduling Information:   Preferred [place]: Mary Roe     Requesting Specific Provider? no   Are there any dates/time the patient cannot be seen? Miscellaneous: family h/o ovarian CA   After completing the above information, please route to Financial Counselor and the appropriate Nurse Navigator for review

## 2019-12-04 ENCOUNTER — TELEPHONE (OUTPATIENT)
Dept: HEMATOLOGY ONCOLOGY | Facility: CLINIC | Age: 24
End: 2019-12-04

## 2019-12-04 NOTE — TELEPHONE ENCOUNTER
Patient cancelled appointment on accident  Was able to give her same appointment back  Patient voiced understanding

## 2019-12-31 ENCOUNTER — TELEPHONE (OUTPATIENT)
Dept: FAMILY MEDICINE CLINIC | Facility: CLINIC | Age: 24
End: 2019-12-31

## 2020-01-16 ENCOUNTER — OFFICE VISIT (OUTPATIENT)
Dept: GYNECOLOGIC ONCOLOGY | Facility: CLINIC | Age: 25
End: 2020-01-16

## 2020-01-16 ENCOUNTER — TELEPHONE (OUTPATIENT)
Dept: GYNECOLOGIC ONCOLOGY | Facility: CLINIC | Age: 25
End: 2020-01-16

## 2020-01-16 VITALS
BODY MASS INDEX: 32.99 KG/M2 | WEIGHT: 198 LBS | TEMPERATURE: 98.8 F | DIASTOLIC BLOOD PRESSURE: 70 MMHG | SYSTOLIC BLOOD PRESSURE: 108 MMHG | HEART RATE: 68 BPM | HEIGHT: 65 IN

## 2020-01-16 DIAGNOSIS — N83.8 OVARIAN MASS, LEFT: Primary | ICD-10-CM

## 2020-01-16 PROBLEM — N83.202 CYST OF LEFT OVARY: Status: RESOLVED | Noted: 2019-09-10 | Resolved: 2020-01-16

## 2020-01-16 PROCEDURE — 99242 OFF/OP CONSLTJ NEW/EST SF 20: CPT | Performed by: OBSTETRICS & GYNECOLOGY

## 2020-01-16 NOTE — PROGRESS NOTES
Assessment/Plan:    Problem List Items Addressed This Visit        Other    Ovarian mass, left - Primary     26-year-old  0 who desires future fertility with an enlarging isoechoic ovarian mass now measuring 1 9 cm in diameter  She has occasional left-sided pelvic pain and occasionally misses a monthly cycle  Her performance status is 0   1  MRI of pelvis to evaluate the ovarian mass  2  If the MRI is equivocal or demonstrates concerning pathology, consideration will be given to laparoscopic resection of the ovarian mass  Thank you for the courtesy of this consultation  All questions were answered by the end the visit  Relevant Orders    MRI pelvis female wo and w contrast              CHIEF COMPLAINT:  Abnormal imaging of the ovary          Patient ID: Humza Chavez is a 21 y o  female  26-year-old  0 presents as a consultation from [address] discuss treatment options for enlarging left-sided isoechoic ovarian mass  In 2019 the mass measuring 1 5 x 1 3 cm  It now measures 1 9 x 1 5 cm  There is no apparent vascular flow to the mass  I reviewed the ultrasound images  She says she has regular cycles that last approximately 4 days  Occasionally, she skips cycle  She does not have heavy bleeding  No other history of abnormal uterine bleeding  Normal Pap smears  She does not note excessive hair growth or hair loss  She has occasional pelvic pain that does not limit her activities  She does not require narcotic therapy  Review of Systems   Respiratory: Positive for cough  Cardiovascular: Positive for leg swelling  Genitourinary: Positive for menstrual problem  Allergic/Immunologic: Positive for environmental allergies  All other systems reviewed and are negative        Current Outpatient Medications   Medication Sig Dispense Refill    Lidocaine Viscous HCl (XYLOCAINE) 2 % mucosal solution Swish and spit 15 mL 4 (four) times a day as needed for mild pain 100 mL 0     No current facility-administered medications for this visit  Allergies   Allergen Reactions    Latex     Penicillins Hives    Toradol [Ketorolac Tromethamine]        History reviewed  No pertinent past medical history  Past Surgical History:   Procedure Laterality Date    FRACTURE SURGERY         OB History        0    Para   0    Term   0       0    AB   0    Living   0       SAB   0    TAB   0    Ectopic   0    Multiple   0    Live Births   0                 Family History   Problem Relation Age of Onset    No Known Problems Mother     Diabetes Father     No Known Problems Sister     No Known Problems Sister     Ovarian cancer Maternal Aunt        The following portions of the patient's history were reviewed and updated as appropriate: allergies, current medications, past family history, past medical history, past social history, past surgical history and problem list       Objective:    Blood pressure 108/70, pulse 68, temperature 98 8 °F (37 1 °C), temperature source Tympanic, height 5' 5" (1 651 m), weight 89 8 kg (198 lb), not currently breastfeeding  Body mass index is 32 95 kg/m²  Physical Exam   Constitutional: She is oriented to person, place, and time  She appears well-developed and well-nourished  No distress  Pulmonary/Chest: Effort normal    Neurological: She is alert and oriented to person, place, and time  Skin: She is not diaphoretic  Psychiatric: She has a normal mood and affect   Her behavior is normal  Judgment and thought content normal          No results found for:   Lab Results   Component Value Date    WBC 8 50 2019    HGB 14 4 2019    HCT 45 5 2019    MCV 87 2019     2019     Lab Results   Component Value Date    K 3 3 (L) 2019    CL 99 (L) 2019    CO2 25 2019    BUN 10 2019    CREATININE 0 81 2019    CALCIUM 8 9 2019    EGFR 102 2019

## 2020-01-16 NOTE — LETTER
2020     Sharon Ortiz [address] [address]    Patient: Wyatt Woods   YOB: 1995   Date of Visit: 2020       Dear Dr Anthony Urena: Thank you for referring Darci Barrios to me for evaluation  Below are my notes for this consultation  If you have questions, please do not hesitate to call me  I look forward to following your patient along with you  Sincerely,        Dede Berg MD        CC: No Recipients  Dede Berg MD  2020  2:58 PM  Sign at close encounter  Assessment/Plan:    Problem List Items Addressed This Visit        Other    Ovarian mass, left - Primary     76-year-old  0 who desires future fertility with an enlarging isoechoic ovarian mass now measuring 1 9 cm in diameter  She has occasional left-sided pelvic pain and occasionally misses a monthly cycle  Her performance status is 0   1  MRI of pelvis to evaluate the ovarian mass  2  If the MRI is equivocal or demonstrates concerning pathology, consideration will be given to laparoscopic resection of the ovarian mass  Thank you for the courtesy of this consultation  All questions were answered by the end the visit  Relevant Orders    MRI pelvis female wo and w contrast              CHIEF COMPLAINT:  Abnormal imaging of the ovary          Patient ID: Wyatt Woods is a 21 y o  female  76-year-old  0 presents as a consultation from [address] discuss treatment options for enlarging left-sided isoechoic ovarian mass  In 2019 the mass measuring 1 5 x 1 3 cm  It now measures 1 9 x 1 5 cm  There is no apparent vascular flow to the mass  I reviewed the ultrasound images  She says she has regular cycles that last approximately 4 days  Occasionally, she skips cycle  She does not have heavy bleeding  No other history of abnormal uterine bleeding  Normal Pap smears    She does not note excessive hair growth or hair loss  She has occasional pelvic pain that does not limit her activities  She does not require narcotic therapy  Review of Systems   Respiratory: Positive for cough  Cardiovascular: Positive for leg swelling  Genitourinary: Positive for menstrual problem  Allergic/Immunologic: Positive for environmental allergies  All other systems reviewed and are negative  Current Outpatient Medications   Medication Sig Dispense Refill    Lidocaine Viscous HCl (XYLOCAINE) 2 % mucosal solution Swish and spit 15 mL 4 (four) times a day as needed for mild pain 100 mL 0     No current facility-administered medications for this visit  Allergies   Allergen Reactions    Latex     Penicillins Hives    Toradol [Ketorolac Tromethamine]        History reviewed  No pertinent past medical history  Past Surgical History:   Procedure Laterality Date    FRACTURE SURGERY         OB History        0    Para   0    Term   0       0    AB   0    Living   0       SAB   0    TAB   0    Ectopic   0    Multiple   0    Live Births   0                 Family History   Problem Relation Age of Onset    No Known Problems Mother     Diabetes Father     No Known Problems Sister     No Known Problems Sister     Ovarian cancer Maternal Aunt        The following portions of the patient's history were reviewed and updated as appropriate: allergies, current medications, past family history, past medical history, past social history, past surgical history and problem list       Objective:    Blood pressure 108/70, pulse 68, temperature 98 8 °F (37 1 °C), temperature source Tympanic, height 5' 5" (1 651 m), weight 89 8 kg (198 lb), not currently breastfeeding  Body mass index is 32 95 kg/m²  Physical Exam   Constitutional: She is oriented to person, place, and time  She appears well-developed and well-nourished  No distress     Pulmonary/Chest: Effort normal    Neurological: She is alert and oriented to person, place, and time  Skin: She is not diaphoretic  Psychiatric: She has a normal mood and affect   Her behavior is normal  Judgment and thought content normal          No results found for:   Lab Results   Component Value Date    WBC 8 50 01/09/2019    HGB 14 4 01/09/2019    HCT 45 5 01/09/2019    MCV 87 01/09/2019     01/09/2019     Lab Results   Component Value Date    K 3 3 (L) 01/09/2019    CL 99 (L) 01/09/2019    CO2 25 01/09/2019    BUN 10 01/09/2019    CREATININE 0 81 01/09/2019    CALCIUM 8 9 01/09/2019    EGFR 102 01/09/2019

## 2020-01-16 NOTE — ASSESSMENT & PLAN NOTE
18-year-old  0 who desires future fertility with an enlarging isoechoic ovarian mass now measuring 1 9 cm in diameter  She has occasional left-sided pelvic pain and occasionally misses a monthly cycle  Her performance status is 0   1  MRI of pelvis to evaluate the ovarian mass  2  If the MRI is equivocal or demonstrates concerning pathology, consideration will be given to laparoscopic resection of the ovarian mass  Thank you for the courtesy of this consultation  All questions were answered by the end the visit

## 2020-01-29 ENCOUNTER — HOSPITAL ENCOUNTER (OUTPATIENT)
Dept: RADIOLOGY | Age: 25
Discharge: HOME | End: 2020-01-29
Attending: OBSTETRICS & GYNECOLOGY
Payer: COMMERCIAL

## 2020-01-29 DIAGNOSIS — N83.8 OVARIAN MASS, LEFT: ICD-10-CM

## 2020-01-29 PROCEDURE — A9585 GADOBUTROL INJECTION: HCPCS | Performed by: OBSTETRICS & GYNECOLOGY

## 2020-01-29 PROCEDURE — 72197 MRI PELVIS W/O & W/DYE: CPT

## 2020-01-29 RX ADMIN — GADOBUTROL 9 ML: 604.72 INJECTION INTRAVENOUS at 12:04

## 2020-02-04 ENCOUNTER — DOCUMENTATION (OUTPATIENT)
Dept: OTHER | Facility: HOSPITAL | Age: 25
End: 2020-02-04

## 2020-02-04 ENCOUNTER — DOCUMENTATION (OUTPATIENT)
Dept: GYNECOLOGIC ONCOLOGY | Facility: CLINIC | Age: 25
End: 2020-02-04

## 2020-02-04 DIAGNOSIS — R93.5 ABNORMAL ULTRASOUND OF OVARY: Primary | ICD-10-CM

## 2020-02-04 NOTE — PROGRESS NOTES
I discussed the results of her MRI with her  The appearance is not worrisome for malignancy  Will therefore plan to f/u with ultrasound in 3 months  She understands and is in agreement

## 2020-02-05 ENCOUNTER — TELEPHONE (OUTPATIENT)
Dept: GYNECOLOGIC ONCOLOGY | Facility: CLINIC | Age: 25
End: 2020-02-05

## 2020-02-05 ENCOUNTER — TELEPHONE (OUTPATIENT)
Dept: FAMILY MEDICINE CLINIC | Facility: CLINIC | Age: 25
End: 2020-02-05

## 2020-02-05 NOTE — TELEPHONE ENCOUNTER
Received inruiet from Dr Jan Stoddard, patient needs an ultrasound scheduled  Left message for patient to call back to get this scheduled

## 2020-03-31 ENCOUNTER — NURSE TRIAGE (OUTPATIENT)
Dept: OTHER | Facility: OTHER | Age: 25
End: 2020-03-31

## 2020-03-31 ENCOUNTER — TELEMEDICINE (OUTPATIENT)
Dept: FAMILY MEDICINE CLINIC | Facility: CLINIC | Age: 25
End: 2020-03-31

## 2020-03-31 ENCOUNTER — TELEPHONE (OUTPATIENT)
Dept: OTHER | Facility: OTHER | Age: 25
End: 2020-03-31

## 2020-03-31 VITALS — HEIGHT: 65 IN | WEIGHT: 198 LBS | BODY MASS INDEX: 32.99 KG/M2

## 2020-03-31 DIAGNOSIS — J01.90 ACUTE NON-RECURRENT SINUSITIS, UNSPECIFIED LOCATION: ICD-10-CM

## 2020-03-31 PROCEDURE — G2012 BRIEF CHECK IN BY MD/QHP: HCPCS | Performed by: FAMILY MEDICINE

## 2020-03-31 RX ORDER — CLARITHROMYCIN 500 MG/1
500 TABLET, COATED ORAL EVERY 12 HOURS SCHEDULED
Qty: 14 TABLET | Refills: 0 | Status: SHIPPED | OUTPATIENT
Start: 2020-03-31 | End: 2020-04-07

## 2020-03-31 NOTE — TELEPHONE ENCOUNTER
Reason for Disposition   [1] No COVID-19 EXPOSURE BUT [2] living with someone who was exposed and who has no fever or cough    Answer Assessment - Initial Assessment Questions  1  CONFIRMED CASE: "Who is the person with the confirmed COVID-19 infection that you were exposed to?"      No known exposure to COVID-19  Denies travel exposure  6  SYMPTOMS: "Do you have any symptoms?" (e g , fever, cough, breathing difficulty)      Onset was 1 week ago  Nasal congestion and runny nose for 2 days with greenish-yellow discharge  Intermittent cough related to her allergies  Intermittently has coughing spells  98 3 (oral) 2 days ago  Body feels hot, no thermometer available now  Intermittent headaches  7  PREGNANCY OR POSTPARTUM: "Is there any chance you are pregnant?" "When was your last menstrual period?" "Did you deliver in the last 2 weeks?"      LMP was 3/28/2020  8  HIGH RISK: "Do you have any heart or lung problems? Do you have a weakened immune system?" (e g , CHF, COPD, asthma, HIV positive, chemotherapy, renal failure, diabetes mellitus, sickle cell anemia)      Denies heart, lung or kidney disease  Denies weakened immune system      Protocols used: CORONAVIRUS (COVID-19) EXPOSURE-ADULT-[place]

## 2020-03-31 NOTE — LETTER
March 31, 2020     Patient: Mckayla Ding   YOB: 1995   Date of Visit: 3/31/2020       To Whom it May Concern:    Santi Appiah is under my professional care  She was seen in my office on 3/31/2020  She may return to work on 4/3/2020  If you have any questions or concerns, please don't hesitate to call           Sincerely,          Cydney Tejada MD        CC: No Recipients

## 2020-03-31 NOTE — PROGRESS NOTES
Virtual Regular Visit    Problem List Items Addressed This Visit     None      Visit Diagnoses     Acute non-recurrent sinusitis, unspecified location    -  Primary    Supportive care reviewed  Relevant Medications    clarithromycin (BIAXIN) 500 mg tablet        Pt concerned about possible COVID-19, but at this time given that her cough is associated more with upper respiratory/sinus infection symptoms, will treat as sinusitis  She does not have fever, shortness of breath, or exposure to COVID-19/recent travel  Reason for visit is cough, congestion    Encounter provider Nehemias Patel MD    Provider located at  [address]  [address] [facility] [address]  [place] [address]      Recent Visits  No visits were found meeting these conditions  Showing recent visits within past 7 days and meeting all other requirements     Today's Visits  Date Type Provider Dept   03/31/20 Telemedicine Nehemias Patel, [address] [facility] today's visits and meeting all other requirements     Future Appointments  Date Type Provider Dept   03/31/20 Telemedicine Nehemias Patel  [facility] future appointments within next 150 days and meeting all other requirements        The patient was identified by name and date of birth  Carmencita Bernabe was informed that this is a telemedicine visit and that the visit is being conducted through [address] [place] [address] and patient was informed that this is not a secure, HIPAA-complaint platform  she agrees to proceed     My office door was closed  No one else was in the room  She acknowledged consent and understanding of privacy and security of the video platform  The patient has agreed to participate and understands they can discontinue the visit at any time  Patient is aware this is a billable service       Niru Delgado is a 31 y o  female who complains of chronic cough which is worsening, nasal congestion, runny nose, sinus pain/pressure, sore throat, post nasal drip, hot flashes and headaches for the past 1 week  Symptoms started gradually and are worsening  Denies fevers, chills, dizziness, N/V, shortness of breath, recent travel or exposure to persons with confirmed COVID-19  No past medical history on file  Past Surgical History:   Procedure Laterality Date    FRACTURE SURGERY         Current Outpatient Medications   Medication Sig Dispense Refill    clarithromycin (BIAXIN) 500 mg tablet Take 1 tablet (500 mg total) by mouth every 12 (twelve) hours for 7 days 14 tablet 0     No current facility-administered medications for this visit  Allergies   Allergen Reactions    Latex     Penicillins Hives    Toradol [Ketorolac Tromethamine]        Review of Systems   Constitutional: Positive for activity change and chills  Negative for appetite change, diaphoresis, fatigue and fever  HENT: Positive for congestion, postnasal drip, rhinorrhea, sinus pressure, sinus pain and sore throat  Respiratory: Positive for cough  Negative for choking, chest tightness, shortness of breath and wheezing  Cardiovascular: Negative for chest pain, palpitations and leg swelling  Gastrointestinal: Negative for abdominal distention, abdominal pain, constipation, diarrhea, nausea and vomiting  Genitourinary: Negative for difficulty urinating, dyspareunia, dysuria, enuresis, flank pain, frequency, menstrual problem, pelvic pain and urgency  Musculoskeletal: Negative for arthralgias, back pain, gait problem, joint swelling, myalgias, neck pain and neck stiffness  Skin: Negative  Neurological: Positive for headaches  Negative for dizziness, tremors, syncope, facial asymmetry, weakness, light-headedness and numbness  Psychiatric/Behavioral: Negative  Physical Exam   Constitutional: She is oriented to person, place, and time  She appears well-developed and well-nourished  No distress     HENT:   Head: Normocephalic and atraumatic  Pulmonary/Chest: Effort normal    Neurological: She is alert and oriented to person, place, and time  Skin: She is not diaphoretic  Psychiatric: She has a normal mood and affect  Her behavior is normal  Judgment and thought content normal         I spent 15 minutes with the patient during this visit

## 2020-03-31 NOTE — TELEPHONE ENCOUNTER
Regarding: [place] virus concerns   ----- Message from Khurram Mendoza sent at 3/31/2020 10:49 AM EDT -----  Patient C/O a cough with mucous for 1 week and feeling warm   Last temp taken 98 3 for 2 days  patient is currently at work and unable to recheck temp  Denies SOB   Denies travelling outside the country or state   Denies any known Covid exposure at home or work   Patient has not seen the PCP in a while and currently does not have insurance

## 2020-04-02 ENCOUNTER — TELEPHONE (OUTPATIENT)
Dept: FAMILY MEDICINE CLINIC | Facility: CLINIC | Age: 25
End: 2020-04-02

## 2020-04-03 ENCOUNTER — TELEPHONE (OUTPATIENT)
Dept: FAMILY MEDICINE CLINIC | Facility: CLINIC | Age: 25
End: 2020-04-03

## 2020-04-21 ENCOUNTER — TELEMEDICINE (OUTPATIENT)
Dept: FAMILY MEDICINE CLINIC | Facility: CLINIC | Age: 25
End: 2020-04-21

## 2020-04-21 DIAGNOSIS — J01.90 ACUTE NON-RECURRENT SINUSITIS, UNSPECIFIED LOCATION: Primary | ICD-10-CM

## 2020-04-21 PROCEDURE — G2012 BRIEF CHECK IN BY MD/QHP: HCPCS | Performed by: FAMILY MEDICINE

## 2020-04-21 RX ORDER — FLUTICASONE PROPIONATE 50 MCG
2 SPRAY, SUSPENSION (ML) NASAL DAILY
Qty: 16 G | Refills: 0 | Status: SHIPPED | OUTPATIENT
Start: 2020-04-21 | End: 2020-06-05

## 2020-05-05 ENCOUNTER — HOSPITAL ENCOUNTER (OUTPATIENT)
Dept: RADIOLOGY | Facility: HOSPITAL | Age: 25
Discharge: HOME | End: 2020-05-05
Attending: OBSTETRICS & GYNECOLOGY
Payer: COMMERCIAL

## 2020-05-05 DIAGNOSIS — R93.5 ABNORMAL ULTRASOUND OF OVARY: ICD-10-CM

## 2020-05-05 PROCEDURE — 76856 US EXAM PELVIC COMPLETE: CPT

## 2020-05-05 PROCEDURE — 76830 TRANSVAGINAL US NON-OB: CPT

## 2020-05-06 ENCOUNTER — DOCUMENTATION (OUTPATIENT)
Dept: GYNECOLOGIC ONCOLOGY | Facility: HOSPITAL | Age: 25
End: 2020-05-06

## 2020-05-06 DIAGNOSIS — N83.8 OVARIAN MASS, LEFT: Primary | ICD-10-CM

## 2020-06-05 ENCOUNTER — OFFICE VISIT (OUTPATIENT)
Dept: FAMILY MEDICINE CLINIC | Facility: CLINIC | Age: 25
End: 2020-06-05
Payer: COMMERCIAL

## 2020-06-05 ENCOUNTER — TELEPHONE (OUTPATIENT)
Dept: FAMILY MEDICINE CLINIC | Facility: CLINIC | Age: 25
End: 2020-06-05

## 2020-06-05 VITALS
HEART RATE: 64 BPM | SYSTOLIC BLOOD PRESSURE: 120 MMHG | HEIGHT: 65 IN | WEIGHT: 214 LBS | RESPIRATION RATE: 16 BRPM | BODY MASS INDEX: 35.65 KG/M2 | DIASTOLIC BLOOD PRESSURE: 88 MMHG | TEMPERATURE: 98 F

## 2020-06-05 DIAGNOSIS — Z13.6 SCREENING FOR CARDIOVASCULAR, RESPIRATORY, AND GENITOURINARY DISEASES: ICD-10-CM

## 2020-06-05 DIAGNOSIS — M79.89 SWELLING OF LOWER EXTREMITY: Primary | ICD-10-CM

## 2020-06-05 DIAGNOSIS — Z13.83 SCREENING FOR CARDIOVASCULAR, RESPIRATORY, AND GENITOURINARY DISEASES: ICD-10-CM

## 2020-06-05 DIAGNOSIS — Z13.89 SCREENING FOR CARDIOVASCULAR, RESPIRATORY, AND GENITOURINARY DISEASES: ICD-10-CM

## 2020-06-05 PROCEDURE — 1036F TOBACCO NON-USER: CPT | Performed by: FAMILY MEDICINE

## 2020-06-05 PROCEDURE — 99213 OFFICE O/P EST LOW 20 MIN: CPT | Performed by: FAMILY MEDICINE

## 2020-06-05 PROCEDURE — 3008F BODY MASS INDEX DOCD: CPT | Performed by: FAMILY MEDICINE

## 2020-06-29 LAB
ALBUMIN SERPL-MCNC: 4.3 G/DL (ref 3.9–5)
ALBUMIN/GLOB SERPL: 1.4 {RATIO} (ref 1.2–2.2)
ALP SERPL-CCNC: 93 IU/L (ref 39–117)
ALT SERPL-CCNC: 11 IU/L (ref 0–32)
AST SERPL-CCNC: 14 IU/L (ref 0–40)
BILIRUB SERPL-MCNC: 0.3 MG/DL (ref 0–1.2)
BUN SERPL-MCNC: 14 MG/DL (ref 6–20)
BUN/CREAT SERPL: 16 (ref 9–23)
CALCIUM SERPL-MCNC: 9.2 MG/DL (ref 8.7–10.2)
CHLORIDE SERPL-SCNC: 100 MMOL/L (ref 96–106)
CHOLEST SERPL-MCNC: 171 MG/DL (ref 100–199)
CO2 SERPL-SCNC: 23 MMOL/L (ref 20–29)
CREAT SERPL-MCNC: 0.88 MG/DL (ref 0.57–1)
GLOBULIN SER-MCNC: 3 G/DL (ref 1.5–4.5)
GLUCOSE SERPL-MCNC: 90 MG/DL (ref 65–99)
HDLC SERPL-MCNC: 62 MG/DL
LDLC SERPL CALC-MCNC: 94 MG/DL (ref 0–99)
MICRODELETION SYND BLD/T FISH: NORMAL
POTASSIUM SERPL-SCNC: 4.7 MMOL/L (ref 3.5–5.2)
PROT SERPL-MCNC: 7.3 G/DL (ref 6–8.5)
SL AMB EGFR AFRICAN AMERICAN: 105 ML/MIN/1.73
SL AMB EGFR NON AFRICAN AMERICAN: 91 ML/MIN/1.73
SODIUM SERPL-SCNC: 139 MMOL/L (ref 134–144)
TRIGL SERPL-MCNC: 77 MG/DL (ref 0–149)

## 2020-07-27 DIAGNOSIS — R63.5 WEIGHT GAIN: Primary | ICD-10-CM

## 2020-08-19 ENCOUNTER — HOSPITAL ENCOUNTER (OUTPATIENT)
Dept: RADIOLOGY | Facility: HOSPITAL | Age: 25
Discharge: HOME | End: 2020-08-19
Attending: OBSTETRICS & GYNECOLOGY
Payer: COMMERCIAL

## 2020-08-19 DIAGNOSIS — N83.8 OVARIAN MASS, LEFT: ICD-10-CM

## 2020-08-19 PROCEDURE — 76830 TRANSVAGINAL US NON-OB: CPT

## 2020-08-19 PROCEDURE — 76856 US EXAM PELVIC COMPLETE: CPT

## 2020-08-27 ENCOUNTER — TELEPHONE (OUTPATIENT)
Dept: GYNECOLOGIC ONCOLOGY | Facility: CLINIC | Age: 25
End: 2020-08-27

## 2020-08-27 NOTE — TELEPHONE ENCOUNTER
Called patient with normal u/s results  She will f/u with her primary gynecologist for routine care

## 2020-09-15 ENCOUNTER — TELEMEDICINE (OUTPATIENT)
Dept: FAMILY MEDICINE CLINIC | Facility: CLINIC | Age: 25
End: 2020-09-15
Payer: COMMERCIAL

## 2020-09-15 DIAGNOSIS — R09.82 POSTNASAL DRIP: ICD-10-CM

## 2020-09-15 DIAGNOSIS — F41.9 ANXIETY: Primary | ICD-10-CM

## 2020-09-15 PROCEDURE — 99213 OFFICE O/P EST LOW 20 MIN: CPT | Performed by: FAMILY MEDICINE

## 2020-09-15 RX ORDER — HYDROXYZINE HYDROCHLORIDE 25 MG/1
25 TABLET, FILM COATED ORAL EVERY 6 HOURS PRN
Qty: 30 TABLET | Refills: 0 | Status: SHIPPED | OUTPATIENT
Start: 2020-09-15 | End: 2020-10-14

## 2020-09-15 NOTE — PROGRESS NOTES
Virtual Regular Visit      Assessment/Plan:    Problem List Items Addressed This Visit     None      Visit Diagnoses     Anxiety    -  Primary    Relevant Medications    hydrOXYzine HCL (ATARAX) 25 mg tablet    Other Relevant Orders    Ambulatory referral to Psychology    Postnasal drip        Continue mucinex  Trial antihistamine  Counseled on supportive care  If sx do not improve or worsen, consider ENT referral given that symptoms have persisted  Reason for visit is   Chief Complaint   Patient presents with    Virtual Regular Visit        Encounter provider Bernard Damian MD    Provider located at [place] [address]  [address] [address]      Recent Visits  No visits were found meeting these conditions  Showing recent visits within past 7 days and meeting all other requirements     Today's Visits  Date Type Provider Dept   09/15/20 Telemedicine Pablo Abreu today's visits and meeting all other requirements     Future Appointments  No visits were found meeting these conditions  Showing future appointments within next 150 days and meeting all other requirements        The patient was identified by name and date of birth  Ella Liz was informed that this is a telemedicine visit and that the visit is being conducted through [facility] and patient was informed that this is a secure, HIPAA-compliant platform  She agrees to proceed     My office door was closed  No one else was in the room  She acknowledged consent and understanding of privacy and security of the video platform  The patient has agreed to participate and understands they can discontinue the visit at any time  Patient is aware this is a billable service  Jazmin Alcala is a 31 y o  female   HPI   [place] presents today to discuss symptoms of stress/anxiety   States that over the past few weeks she has occasional panic attacks which consist of her throat closing up, SOB, chest tightness  Pt's mother was recently diagnosed with cancer and almost all of her stress/anxiety is related to this  Has considered therapy  Also c/o persistent cough and post nasal drip  States she only coughs in the morning- has thick yellow mucus, but once she clears it- no more coughing the rest of the day  Takes mucinex with relief  GABO-7 Flowsheet Screening      Most Recent Value   Over the last two weeks, how often have you been bothered by the following problems? Feeling nervous, anxious, or on edge  1   Not being able to stop or control worrying  1   Worrying too much about different things  0   Trouble relaxing   1   Being so restless that it's hard to sit still  0   Becoming easily annoyed or irritable   1   Feeling afraid as if something awful might happen  0   How difficult have these problems made it for you to do your work, take care of things at home, or get along with other people? Somewhat difficult   GABO Score   4          No past medical history on file  Past Surgical History:   Procedure Laterality Date    FRACTURE SURGERY         Current Outpatient Medications   Medication Sig Dispense Refill    hydrOXYzine HCL (ATARAX) 25 mg tablet Take 1 tablet (25 mg total) by mouth every 6 (six) hours as needed for anxiety 30 tablet 0     No current facility-administered medications for this visit  Allergies   Allergen Reactions    Latex     Penicillins Hives    Toradol [Ketorolac Tromethamine]        Review of Systems   Constitutional: Negative for activity change, appetite change, chills, diaphoresis, fatigue and fever  HENT: Positive for postnasal drip  Respiratory: Positive for cough  Negative for choking, chest tightness, shortness of breath and wheezing  Cardiovascular: Negative for chest pain, palpitations and leg swelling     Gastrointestinal: Negative for abdominal distention, abdominal pain, constipation, diarrhea, nausea and vomiting  Genitourinary: Negative for difficulty urinating, dyspareunia, dysuria, enuresis, flank pain, frequency, menstrual problem, pelvic pain and urgency  Musculoskeletal: Negative for arthralgias, back pain, gait problem, joint swelling, myalgias, neck pain and neck stiffness  Skin: Negative  Neurological: Negative for dizziness, tremors, syncope, facial asymmetry, weakness, light-headedness, numbness and headaches  Psychiatric/Behavioral: The patient is nervous/anxious  Video Exam    There were no vitals filed for this visit  Physical Exam  Constitutional:       General: She is not in acute distress  Appearance: She is well-developed  She is not diaphoretic  HENT:      Head: Normocephalic and atraumatic  Eyes:      General: No scleral icterus  Right eye: No discharge  Left eye: No discharge  Conjunctiva/sclera: Conjunctivae normal    Neck:      Musculoskeletal: Normal range of motion  Pulmonary:      Effort: Pulmonary effort is normal    Skin:     General: Skin is warm  Neurological:      Mental Status: She is alert and oriented to person, place, and time  Psychiatric:         Behavior: Behavior normal          Thought Content: Thought content normal          Judgment: Judgment normal           I spent 15 minutes directly with the patient during this visit      Felicity Rachel Sandhu acknowledges that she has consented to an online visit or consultation  She understands that the online visit is based solely on information provided by her, and that, in the absence of a face-to-face physical evaluation by the physician, the diagnosis she receives is both limited and provisional in terms of accuracy and completeness  This is not intended to replace a full medical face-to-face evaluation by the physician  Rosario Iglesias understands and accepts these terms

## 2020-10-14 ENCOUNTER — ANNUAL EXAM (OUTPATIENT)
Dept: OBGYN CLINIC | Facility: CLINIC | Age: 25
End: 2020-10-14
Payer: COMMERCIAL

## 2020-10-14 VITALS
WEIGHT: 215 LBS | SYSTOLIC BLOOD PRESSURE: 108 MMHG | DIASTOLIC BLOOD PRESSURE: 76 MMHG | TEMPERATURE: 99.4 F | BODY MASS INDEX: 35.78 KG/M2

## 2020-10-14 DIAGNOSIS — Z01.419 WELL WOMAN EXAM WITH ROUTINE GYNECOLOGICAL EXAM: Primary | ICD-10-CM

## 2020-10-14 DIAGNOSIS — Z23 NEED FOR HPV VACCINATION: ICD-10-CM

## 2020-10-14 PROCEDURE — 99395 PREV VISIT EST AGE 18-39: CPT | Performed by: OBSTETRICS & GYNECOLOGY

## 2020-10-14 PROCEDURE — G0145 SCR C/V CYTO,THINLAYER,RESCR: HCPCS | Performed by: OBSTETRICS & GYNECOLOGY

## 2020-10-14 PROCEDURE — 90651 9VHPV VACCINE 2/3 DOSE IM: CPT | Performed by: OBSTETRICS & GYNECOLOGY

## 2020-10-14 PROCEDURE — 90471 IMMUNIZATION ADMIN: CPT | Performed by: OBSTETRICS & GYNECOLOGY

## 2020-10-15 ENCOUNTER — TELEPHONE (OUTPATIENT)
Dept: OBGYN CLINIC | Facility: CLINIC | Age: 25
End: 2020-10-15

## 2020-10-20 ENCOUNTER — OFFICE VISIT (OUTPATIENT)
Dept: FAMILY MEDICINE CLINIC | Facility: CLINIC | Age: 25
End: 2020-10-20
Payer: COMMERCIAL

## 2020-10-20 VITALS
OXYGEN SATURATION: 98 % | BODY MASS INDEX: 35.99 KG/M2 | WEIGHT: 216 LBS | DIASTOLIC BLOOD PRESSURE: 70 MMHG | HEIGHT: 65 IN | HEART RATE: 78 BPM | RESPIRATION RATE: 16 BRPM | SYSTOLIC BLOOD PRESSURE: 100 MMHG | TEMPERATURE: 98.5 F

## 2020-10-20 DIAGNOSIS — J31.0 CHRONIC RHINITIS: ICD-10-CM

## 2020-10-20 DIAGNOSIS — Z23 NEED FOR VACCINATION: ICD-10-CM

## 2020-10-20 DIAGNOSIS — Z00.00 WELL ADULT EXAM: Primary | ICD-10-CM

## 2020-10-20 PROCEDURE — 3725F SCREEN DEPRESSION PERFORMED: CPT | Performed by: FAMILY MEDICINE

## 2020-10-20 PROCEDURE — 90686 IIV4 VACC NO PRSV 0.5 ML IM: CPT

## 2020-10-20 PROCEDURE — 90715 TDAP VACCINE 7 YRS/> IM: CPT

## 2020-10-20 PROCEDURE — 90472 IMMUNIZATION ADMIN EACH ADD: CPT

## 2020-10-20 PROCEDURE — 90471 IMMUNIZATION ADMIN: CPT

## 2020-10-20 PROCEDURE — 1036F TOBACCO NON-USER: CPT | Performed by: FAMILY MEDICINE

## 2020-10-20 PROCEDURE — 99395 PREV VISIT EST AGE 18-39: CPT | Performed by: FAMILY MEDICINE

## 2020-10-22 LAB
LAB AP GYN PRIMARY INTERPRETATION: NORMAL
Lab: NORMAL

## 2020-12-15 ENCOUNTER — TELEMEDICINE (OUTPATIENT)
Dept: FAMILY MEDICINE CLINIC | Facility: CLINIC | Age: 25
End: 2020-12-15
Payer: COMMERCIAL

## 2020-12-15 DIAGNOSIS — Z03.818 ENCOUNTER FOR OBSERVATION FOR SUSPECTED EXPOSURE TO OTHER BIOLOGICAL AGENTS RULED OUT: Primary | ICD-10-CM

## 2020-12-15 DIAGNOSIS — Z03.818 ENCOUNTER FOR OBSERVATION FOR SUSPECTED EXPOSURE TO OTHER BIOLOGICAL AGENTS RULED OUT: ICD-10-CM

## 2020-12-15 PROCEDURE — U0003 INFECTIOUS AGENT DETECTION BY NUCLEIC ACID (DNA OR RNA); SEVERE ACUTE RESPIRATORY SYNDROME CORONAVIRUS 2 (SARS-COV-2) (CORONAVIRUS DISEASE [COVID-19]), AMPLIFIED PROBE TECHNIQUE, MAKING USE OF HIGH THROUGHPUT TECHNOLOGIES AS DESCRIBED BY CMS-2020-01-R: HCPCS | Performed by: FAMILY MEDICINE

## 2020-12-15 PROCEDURE — 99214 OFFICE O/P EST MOD 30 MIN: CPT | Performed by: FAMILY MEDICINE

## 2020-12-17 LAB — SARS-COV-2 RNA SPEC QL NAA+PROBE: NOT DETECTED

## 2021-01-06 ENCOUNTER — TELEMEDICINE (OUTPATIENT)
Dept: FAMILY MEDICINE CLINIC | Facility: CLINIC | Age: 26
End: 2021-01-06
Payer: COMMERCIAL

## 2021-01-06 DIAGNOSIS — J30.9 ALLERGIC RHINITIS, UNSPECIFIED SEASONALITY, UNSPECIFIED TRIGGER: Primary | ICD-10-CM

## 2021-01-06 PROCEDURE — 99213 OFFICE O/P EST LOW 20 MIN: CPT | Performed by: FAMILY MEDICINE

## 2021-01-06 RX ORDER — MONTELUKAST SODIUM 10 MG/1
10 TABLET ORAL
Qty: 30 TABLET | Refills: 1 | Status: SHIPPED | OUTPATIENT
Start: 2021-01-06 | End: 2021-01-18

## 2021-01-06 NOTE — PROGRESS NOTES
Virtual Regular Visit      Assessment/Plan:    Problem List Items Addressed This Visit        Respiratory    Allergic rhinitis - Primary    Relevant Medications    montelukast (SINGULAIR) 10 mg tablet      Counseled on supportive care including nasal saline, mucinex  She will follow up with ENT  Reason for visit is   Chief Complaint   Patient presents with    Virtual Regular Visit        Encounter provider Cydney Tejada MD    Provider located at  [address]  [address] [facility] [address]  [place] [address]      Recent Visits  No visits were found meeting these conditions  Showing recent visits within past 7 days and meeting all other requirements     Today's Visits  Date Type Provider Dept   01/06/21 Telemedicine Cydney Tejada, [facility] today's visits and meeting all other requirements     Future Appointments  No visits were found meeting these conditions  Showing future appointments within next 150 days and meeting all other requirements        The patient was identified by name and date of birth  Mckayla Ding was informed that this is a telemedicine visit and that the visit is being conducted through telephone  My office door was closed  No one else was in the room  She acknowledged consent and understanding of privacy and security of the video platform  The patient has agreed to participate and understands they can discontinue the visit at any time  Patient is aware this is a billable service  Sola Diamond is a 31 y o  female   [facility] has visit today for c/o chronic cough and allergic rhinitis symptoms  She has tried mucinex, robitussin and allergy medications with no relief  Has been seen here for the same in the past and given referral to ENT  States she could not get an appointment until next month  Denies fevers, chills, chest pain, SOB  No past medical history on file      Past Surgical History: Procedure Laterality Date    FRACTURE SURGERY Right 2004    hip       Current Outpatient Medications   Medication Sig Dispense Refill    BIOTIN PO Take by mouth      Loratadine (ALAVERT PO) Take by mouth      montelukast (SINGULAIR) 10 mg tablet Take 1 tablet (10 mg total) by mouth daily at bedtime 30 tablet 1     No current facility-administered medications for this visit  Allergies   Allergen Reactions    Latex     Penicillins Hives    Toradol [Ketorolac Tromethamine]        Review of Systems   Constitutional: Negative  HENT: Positive for congestion, postnasal drip and rhinorrhea  Eyes: Negative  Respiratory: Positive for cough  Cardiovascular: Negative  Gastrointestinal: Negative  Endocrine: Negative  Genitourinary: Negative  Musculoskeletal: Negative  Skin: Negative  Allergic/Immunologic: Negative  Neurological: Negative  Hematological: Negative  Psychiatric/Behavioral: Negative  Video Exam    There were no vitals filed for this visit  Physical Exam   It was my intent to perform this visit via video technology but the patient was not able to do a video connection so the visit was completed via audio telephone only  I spent 15 minutes directly with the patient during this visit      Felicity Sandhu acknowledges that she has consented to an online visit or consultation  She understands that the online visit is based solely on information provided by her, and that, in the absence of a face-to-face physical evaluation by the physician, the diagnosis she receives is both limited and provisional in terms of accuracy and completeness  This is not intended to replace a full medical face-to-face evaluation by the physician  Marylou Robertson understands and accepts these terms

## 2021-01-13 ENCOUNTER — CLINICAL SUPPORT (OUTPATIENT)
Dept: OBGYN CLINIC | Facility: CLINIC | Age: 26
End: 2021-01-13
Payer: COMMERCIAL

## 2021-01-13 DIAGNOSIS — Z23 NEED FOR HPV VACCINATION: Primary | ICD-10-CM

## 2021-01-13 PROCEDURE — 90651 9VHPV VACCINE 2/3 DOSE IM: CPT | Performed by: OBSTETRICS & GYNECOLOGY

## 2021-01-13 PROCEDURE — 90471 IMMUNIZATION ADMIN: CPT | Performed by: OBSTETRICS & GYNECOLOGY

## 2021-01-13 NOTE — PROGRESS NOTES
Pt  Here for 2nd HPV shot  Identified by name &   Injection given in LEFT deltoid without difficulty  Pt  Asking for referral to genetic counseling r/t fam hx of GYN cancer in mother and maternal aunt

## 2021-01-18 ENCOUNTER — OFFICE VISIT (OUTPATIENT)
Dept: OTOLARYNGOLOGY | Facility: CLINIC | Age: 26
End: 2021-01-18
Payer: COMMERCIAL

## 2021-01-18 ENCOUNTER — HOSPITAL ENCOUNTER (OUTPATIENT)
Dept: RADIOLOGY | Facility: HOSPITAL | Age: 26
Discharge: HOME | End: 2021-01-18
Payer: COMMERCIAL

## 2021-01-18 ENCOUNTER — TRANSCRIBE ORDERS (OUTPATIENT)
Dept: ADMINISTRATIVE | Facility: HOSPITAL | Age: 26
End: 2021-01-18

## 2021-01-18 VITALS
OXYGEN SATURATION: 99 % | DIASTOLIC BLOOD PRESSURE: 74 MMHG | SYSTOLIC BLOOD PRESSURE: 110 MMHG | TEMPERATURE: 99.1 F | HEART RATE: 71 BPM | HEIGHT: 65 IN | BODY MASS INDEX: 34.66 KG/M2 | WEIGHT: 208 LBS

## 2021-01-18 DIAGNOSIS — J31.0 CHRONIC RHINITIS: ICD-10-CM

## 2021-01-18 DIAGNOSIS — R05.3 CHRONIC COUGH: Primary | ICD-10-CM

## 2021-01-18 DIAGNOSIS — J30.9 ALLERGIC RHINITIS, UNSPECIFIED SEASONALITY, UNSPECIFIED TRIGGER: ICD-10-CM

## 2021-01-18 DIAGNOSIS — R05.3 CHRONIC COUGH: ICD-10-CM

## 2021-01-18 PROCEDURE — 99204 OFFICE O/P NEW MOD 45 MIN: CPT | Performed by: SPECIALIST

## 2021-01-18 PROCEDURE — 71046 X-RAY EXAM CHEST 2 VIEWS: CPT

## 2021-01-18 PROCEDURE — 3008F BODY MASS INDEX DOCD: CPT | Performed by: FAMILY MEDICINE

## 2021-01-18 NOTE — ASSESSMENT & PLAN NOTE
Discussed nature of chronic cough and may be multi-factorial   Discussed post nasal drip, reflux, vs pulmonary processes (asthma variant) that may impact cough  Reviewed options including voice rest, reflux medication therapy, speech therapy, chest x-ray, Restech study, Feno testing, consult with Dr Owen Sin  Reviewed side effects and action of reflux therapy medications     After discussion agreed to FENO and chest x-ray testing at our [place] site to rule out asthma vs reflux  Follow up after testing

## 2021-01-18 NOTE — PROGRESS NOTES
Assessment/Plan:    Chronic cough  Discussed nature of chronic cough and may be multi-factorial   Discussed post nasal drip, reflux, vs pulmonary processes (asthma variant) that may impact cough  Reviewed options including voice rest, reflux medication therapy, speech therapy, chest x-ray, Restech study, Feno testing, consult with Dr Mela Delong  Reviewed side effects and action of reflux therapy medications  After discussion agreed to FENO and chest x-ray testing at our [place] site to rule out asthma vs reflux  Follow up after testing      Allergic rhinitis  Discussed prior allergy testing and per patient indicating multiple positive allergens  Recommend using saline irrigation and nasal steroids on daily basis for up to at least three months to see improvement  Reviewed allergy involvement and follow up with allergist   Also suggest obtaining CT scan to further evaluate sinus cavities  Discussed surgical options if needed  Agreed to use saline and Fluticasone daily  Diagnoses and all orders for this visit:    Chronic cough  -     X-ray chest 2 views; Future    Chronic rhinitis  -     Ambulatory Referral to Otolaryngology    Allergic rhinitis, unspecified seasonality, unspecified trigger          Subjective:      Patient ID: Leona Mosqueda is a 31 y o  female  Presents today as a new patient consultation due to Chronic cough  Past few months increased mucus in mornings  Episodes of cough throughout day  Tried multiple medications including antihistamines with no change  Chest tightness at times  Denies voice changes but has occasional throat clearing  No prior chest x-ray  Allergy testing in 2018  Was offered allergy desensitization at the time but did not pursue    Denies itching eyes, sneezing        The following portions of the patient's history were reviewed and updated as appropriate: allergies, current medications, past family history, past medical history, past social history, past surgical history and problem list     Review of Systems   Constitutional: Negative  HENT: Positive for congestion and postnasal drip  Negative for ear discharge, ear pain, hearing loss, nosebleeds, rhinorrhea, sinus pressure, sinus pain, sore throat, tinnitus and voice change  Eyes: Negative  Respiratory: Positive for cough  Negative for chest tightness and shortness of breath  Cardiovascular: Negative  Gastrointestinal: Negative  Endocrine: Negative  Musculoskeletal: Negative  Skin: Negative for color change  Neurological: Negative for dizziness, numbness and headaches  Psychiatric/Behavioral: Negative  Objective:      /74 (BP Location: Left arm, Patient Position: Sitting, Cuff Size: Adult)   Pulse 71   Temp 99 1 °F (37 3 °C) (Temporal)   Ht 5' 5" (1 651 m)   Wt 94 3 kg (208 lb)   SpO2 99%   BMI 34 61 kg/m²          Physical Exam  Constitutional:       Appearance: She is well-developed  HENT:      Head: Normocephalic  Right Ear: Hearing, tympanic membrane, ear canal and external ear normal  No decreased hearing noted  No drainage or tenderness  Tympanic membrane is not perforated or erythematous  Left Ear: Hearing, tympanic membrane, ear canal and external ear normal  No decreased hearing noted  No drainage or tenderness  Tympanic membrane is not perforated or erythematous  Nose: Nose normal  No nasal deformity or septal deviation  Mouth/Throat:      Mouth: Mucous membranes are not pale and not dry  No oral lesions  Dentition: Normal dentition  Pharynx: Uvula midline  No oropharyngeal exudate  Neck:      Musculoskeletal: Full passive range of motion without pain, normal range of motion and neck supple  Trachea: No tracheal deviation  Cardiovascular:      Rate and Rhythm: Normal rate  Pulmonary:      Effort: Pulmonary effort is normal  No accessory muscle usage or respiratory distress     Musculoskeletal:      Right shoulder: She exhibits normal range of motion  Lymphadenopathy:      Cervical: No cervical adenopathy  Skin:     General: Skin is warm and dry  Neurological:      Mental Status: She is alert and oriented to person, place, and time  Cranial Nerves: No cranial nerve deficit  Sensory: No sensory deficit  Psychiatric:         Behavior: Behavior is cooperative           Scribe Attestation    I,:  ALYSSA Schofield am acting as a scribe while in the presence of the attending physician :       I,:  Beltran Kenny MD personally performed the services described in this documentation    as scribed in my presence :

## 2021-01-18 NOTE — ASSESSMENT & PLAN NOTE
Discussed prior allergy testing and per patient indicating multiple positive allergens  Recommend using saline irrigation and nasal steroids on daily basis for up to at least three months to see improvement  Reviewed allergy involvement and follow up with allergist   Also suggest obtaining CT scan to further evaluate sinus cavities  Discussed surgical options if needed  Agreed to use saline and Fluticasone daily

## 2021-01-21 ENCOUNTER — TELEPHONE (OUTPATIENT)
Dept: FAMILY MEDICINE CLINIC | Facility: CLINIC | Age: 26
End: 2021-01-21

## 2021-01-21 ENCOUNTER — TELEMEDICINE (OUTPATIENT)
Dept: FAMILY MEDICINE CLINIC | Facility: CLINIC | Age: 26
End: 2021-01-21
Payer: COMMERCIAL

## 2021-01-21 ENCOUNTER — TELEPHONE (OUTPATIENT)
Dept: OTOLARYNGOLOGY | Facility: CLINIC | Age: 26
End: 2021-01-21

## 2021-01-21 DIAGNOSIS — R93.89 ABNORMAL CXR: Primary | ICD-10-CM

## 2021-01-21 DIAGNOSIS — R07.89 CHEST TIGHTNESS: ICD-10-CM

## 2021-01-21 PROCEDURE — 99213 OFFICE O/P EST LOW 20 MIN: CPT | Performed by: FAMILY MEDICINE

## 2021-01-21 PROCEDURE — 1036F TOBACCO NON-USER: CPT | Performed by: FAMILY MEDICINE

## 2021-01-21 RX ORDER — AZITHROMYCIN 250 MG/1
TABLET, FILM COATED ORAL
Qty: 6 TABLET | Refills: 0 | Status: SHIPPED | OUTPATIENT
Start: 2021-01-21 | End: 2021-01-21

## 2021-01-21 RX ORDER — AZITHROMYCIN 200 MG/5ML
POWDER, FOR SUSPENSION ORAL
Qty: 37.7 ML | Refills: 0 | Status: SHIPPED | OUTPATIENT
Start: 2021-01-21 | End: 2021-01-26

## 2021-01-21 NOTE — TELEPHONE ENCOUNTER
Barbara Messer went for a CXR you ordered and is having a little chest tightness and HX of cough can you please review the results and call her at [phone]

## 2021-01-21 NOTE — TELEPHONE ENCOUNTER
PT needs a liquid form of the medication  Pt also  needs a note stating she is cleared for work and this can be put right into the mychart   Pls advise

## 2021-01-21 NOTE — PROGRESS NOTES
Virtual Regular Visit      Assessment/Plan:    Problem List Items Addressed This Visit     None      Visit Diagnoses     Abnormal CXR    -  Primary    Relevant Medications    azithromycin (Zithromax) 250 mg tablet    Chest tightness        Relevant Medications    azithromycin (Zithromax) 250 mg tablet        Reviewed CXR which was done recently showing focal opacity in the left base adjacent to the left heart border indicating possible pneumonia vs benign pericardial fat pad  Will treat for possible pneumonia with azithromycin given her symptoms  If symptoms persist, she will follow up with pulmonology  Reason for visit is   Chief Complaint   Patient presents with    Virtual Regular Visit        Encounter provider Marie Tafoya MD    Provider located at  [address] [address]      Recent Visits  No visits were found meeting these conditions  Showing recent visits within past 7 days and meeting all other requirements     Today's Visits  Date Type Provider Dept   01/21/21 Telemedicine Marie Tafoya, [address] [facility] today's visits and meeting all other requirements     Future Appointments  No visits were found meeting these conditions  Showing future appointments within next 150 days and meeting all other requirements        The patient was identified by name and date of birth  Brii Condon was informed that this is a telemedicine visit and that the visit is being conducted through telephone  My office door was closed  No one else was in the room  She acknowledged consent and understanding of privacy and security of the video platform  The patient has agreed to participate and understands they can discontinue the visit at any time  Patient is aware this is a billable service  Leon Serra is a 31 y o  female         HPI   Lidya Dukes presents today for evaluation of abnormal chest xray which was ordered by her ENT for c/o chronic cough  She recently has been experiencing nasal congestion and increased mucous production over the past month  She has started experiencing chest tightness for the past 2 weeks  Does have mild SOB  Denies wheezing, fevers, chills, fatigue  CXR showed focal opacity in the left base adjacent to the left heart border indicating possible pneumonia vs pericardial fat pad  No past medical history on file  Past Surgical History:   Procedure Laterality Date    FRACTURE SURGERY Right 2004    hip       Current Outpatient Medications   Medication Sig Dispense Refill    azithromycin (Zithromax) 250 mg tablet Take 2 tablets (500 mg total) by mouth daily for 1 day, THEN 1 tablet (250 mg total) daily for 4 days  6 tablet 0    Loratadine (ALAVERT PO) Take by mouth       No current facility-administered medications for this visit  Allergies   Allergen Reactions    Latex     Penicillins Hives    Toradol [Ketorolac Tromethamine]     Singulair [Montelukast] Headache       Review of Systems   Constitutional: Negative  HENT: Negative  Eyes: Negative  Respiratory: Positive for cough, chest tightness and shortness of breath  Cardiovascular: Negative  Gastrointestinal: Negative  Endocrine: Negative  Genitourinary: Negative  Musculoskeletal: Negative  Skin: Negative  Allergic/Immunologic: Negative  Neurological: Negative  Hematological: Negative  Psychiatric/Behavioral: Negative  Video Exam    There were no vitals filed for this visit  Physical Exam   It was my intent to perform this visit via video technology but the patient was not able to do a video connection so the visit was completed via audio telephone only  I spent 15 minutes directly with the patient during this visit      Felicity Rachel Sandhu acknowledges that she has consented to an online visit or consultation   She understands that the online visit is based solely on information provided by her, and that, in the absence of a face-to-face physical evaluation by the physician, the diagnosis she receives is both limited and provisional in terms of accuracy and completeness  This is not intended to replace a full medical face-to-face evaluation by the physician  Lars Paula understands and accepts these terms

## 2021-01-21 NOTE — LETTER
Vivian Campuzano is under my professional care  She was seen by me on 1/21/2021  This letter is to state that she is cleared to return to work on 1/21/2021  If you have any questions or concerns, please feel free to call             Darya Candelario MD

## 2021-02-04 ENCOUNTER — TELEPHONE (OUTPATIENT)
Dept: OTOLARYNGOLOGY | Facility: CLINIC | Age: 26
End: 2021-02-04

## 2021-02-04 NOTE — TELEPHONE ENCOUNTER
Oz Velasco would like you to call her with her FENO test results at [phone]  She had this completed at the [place] office and is confused about what her next step should bew

## 2021-02-25 ENCOUNTER — TELEPHONE (OUTPATIENT)
Dept: OBGYN CLINIC | Facility: CLINIC | Age: 26
End: 2021-02-25

## 2021-02-25 NOTE — TELEPHONE ENCOUNTER
Patient called wondering if a referral could be generated for genetic counseling  She said there is family history of cancer on her mother's side

## 2021-02-26 ENCOUNTER — OFFICE VISIT (OUTPATIENT)
Dept: PULMONOLOGY | Facility: MEDICAL CENTER | Age: 26
End: 2021-02-26
Payer: COMMERCIAL

## 2021-02-26 VITALS
DIASTOLIC BLOOD PRESSURE: 70 MMHG | TEMPERATURE: 98.4 F | WEIGHT: 213 LBS | OXYGEN SATURATION: 98 % | HEART RATE: 88 BPM | HEIGHT: 65 IN | RESPIRATION RATE: 12 BRPM | SYSTOLIC BLOOD PRESSURE: 110 MMHG | BODY MASS INDEX: 35.49 KG/M2

## 2021-02-26 DIAGNOSIS — R05.3 CHRONIC COUGH: Primary | ICD-10-CM

## 2021-02-26 DIAGNOSIS — J30.9 ALLERGIC RHINITIS, UNSPECIFIED SEASONALITY, UNSPECIFIED TRIGGER: ICD-10-CM

## 2021-02-26 PROCEDURE — 1036F TOBACCO NON-USER: CPT | Performed by: INTERNAL MEDICINE

## 2021-02-26 PROCEDURE — 99203 OFFICE O/P NEW LOW 30 MIN: CPT | Performed by: INTERNAL MEDICINE

## 2021-02-26 PROCEDURE — 3008F BODY MASS INDEX DOCD: CPT | Performed by: INTERNAL MEDICINE

## 2021-02-26 RX ORDER — ALBUTEROL SULFATE 90 UG/1
2 AEROSOL, METERED RESPIRATORY (INHALATION) EVERY 4 HOURS PRN
Qty: 18 G | Refills: 6 | Status: SHIPPED | OUTPATIENT
Start: 2021-02-26

## 2021-02-26 NOTE — PROGRESS NOTES
Assessment/Plan:       Problem List Items Addressed This Visit        Respiratory    Allergic rhinitis       She can try Claritin or over-the-counter Flonase or Nasacort steroid nasal spray for allergic rhinitis and postnasal drainage  Other    Chronic cough - Primary       Possibility of cough variant asthma  Recent Fe NO testing  Showed measurement of 59 parts per billion which is elevated  This was suggests possible underlying airway inflammation due to possible asthma  I did offer to put her on trial of a Laba-steroid inhaler to see if this would help her cough  Also told her that if she wanted to methacholine challenge study this was another option  She can use Ventolin inhaler 2 puffs every 4 hours as needed for cough or chest tightness  Cough may also be due to chronic rhinitis with postnasal drainage  She does have known history of allergies  Royanjustin Atwood  wanted to hold off on further testing for now and compare trial of a steroid -Laba inhaler until she sees allergist Dr Bird Nearing  Next week  She has an appointment on March 3rd  I did give her a peak flow meter today  Measure peak flow rate was 390 liters/minute with predicted range of 380-480 liters/minute  I did instruct her to monitor peak flow rates over the next few days     I did instruct her to try an over-the-counter antacids such as Prilosec or Protonix daily for next couple weeks to see if this helps her cough  If so this may indicate that she has LPR-GERD as cause of cough  Relevant Medications    albuterol (Ventolin HFA) 90 mcg/act inhaler            No follow-ups on file  All questions are answered to the patient's satisfaction and understanding  She verbalizes understanding  She is encouraged to call with any further questions or concerns  Portions of the record may have been created with voice recognition software    Occasional wrong word or "sound a like" substitutions may have occurred due to the inherent limitations of voice recognition software  Read the chart carefully and recognize, using context, where substitutions have occurred  a    Electronically Signed by Adarsh Vaz DO    ______________________________________________________________________    Chief Complaint:   Chief Complaint   Patient presents with    [facility]        Patient ID: Nila Moreno is a 31 y o  y o  female has no past medical history on file  2/26/2021  Patient presents today for initial visit for evaluation of chronic cough    HPI     Nila Northern  Is 31years old and has had a persistent intermittent chronic cough for the past 3 years  Cough  Usually occurs in the morning at 10 point she can sometimes cough up some white mucus and yellow mucus when she wakes up  Cough may last for a couple minutes then resolve  Sometimes she has some intermittent cough during the day  She says the cough does not keep her awake  Sometimes she does have some mild chest tightness with the cough  Does not have any wheezing  Occasion has some mild shortness of breath when she walks for with her mask on at her workplace at [employer]  She thinks she may have occasional wheeze when she has her coughing  She is not on any inhalers  She has no history of asthma  States she was born full term  As a young child she sometimes was having frequent sinus infections and bronchitis  Nila Northern did see an allergist about 3 years ago and did have skin testing  She states she did have positive response to multiple things  She has 3 dogs and has no difficulty breathing around her dogs  She does take loratadine at times with no improvement in her   Cough period sometimes does get some postnasal drainage in the morning  She does not use any type of steroid nasal spray  A chest x-ray was done on January 18th which I reviewed    There is a questionable faint infiltrate left lower lobe and she was prescribed a 5 day Z-Yobani with no improvement in her chronic cough  She was not having any fever chills and did not feel sick at the time  She does not get gastric reflux symptoms  Hollly was seen by ENT physician Dr Helder Odom  She did have Fe NO testing done through his office with measurement of 59 ppb which is elevated  Berta Martínez denies any loud snoring or excessive daytime somnolence  She has never been on any inhaler therapy  She does have appointment  On March 3rd with allergist Dr Bryce Mock  No family history of asthma  Occupational/Exposure history: Works at [employer]  Pets/Enviroment:   Has 3 small dogs    Review of Systems   Constitutional: Negative for appetite change and fever  HENT: Positive for postnasal drip, rhinorrhea and sneezing  Negative for ear pain, sore throat and trouble swallowing  Eyes: Negative for redness  Respiratory: Positive for cough  Cardiovascular: Negative for chest pain  Gastrointestinal: Negative for abdominal distention  Denies gastric reflux   Endocrine: Negative for polydipsia and polyphagia  Genitourinary: Negative for hematuria  Musculoskeletal: Negative for myalgias  Neurological: Positive for headaches  Psychiatric/Behavioral: Negative for decreased concentration  Social history: She reports that she has never smoked  She has never used smokeless tobacco  She reports that she does not drink alcohol or use drugs      Past surgical history:   Past Surgical History:   Procedure Laterality Date    FRACTURE SURGERY Right 2004    hip    HIP PINNING Right     Right hip pinning for fracture of growth plate at age 7     Family history:   Family History   Problem Relation Age of Onset    Cancer Mother     Cervical cancer Mother     Diabetes Father     No Known Problems Sister     No Known Problems Sister     Ovarian cancer Maternal Aunt        Immunization History   Administered Date(s) Administered    HPV9 10/14/2020, 01/13/2021    Influenza, injectable, quadrivalent, preservative free 0 5 mL 10/20/2020    Tdap 10/20/2020     Current Outpatient Medications   Medication Sig Dispense Refill    albuterol (Ventolin HFA) 90 mcg/act inhaler Inhale 2 puffs every 4 (four) hours as needed for wheezing or shortness of breath 18 g 6    Loratadine (ALAVERT PO) Take by mouth       No current facility-administered medications for this visit  Allergies: Latex, Penicillins, Toradol [ketorolac tromethamine], and Singulair [montelukast]    Objective:  Vitals:    02/26/21 0950   BP: 110/70   BP Location: Left arm   Patient Position: Sitting   Cuff Size: Large   Pulse: 88   Resp: 12   Temp: 98 4 °F (36 9 °C)   TempSrc: Temporal   SpO2: 98%   Weight: 96 6 kg (213 lb)   Height: 5' 5" (1 651 m)   Oxygen Therapy  SpO2: 98 %    Wt Readings from Last 3 Encounters:   02/26/21 96 6 kg (213 lb)   01/18/21 94 3 kg (208 lb)   10/20/20 98 kg (216 lb)     Body mass index is 35 45 kg/m²  Physical Exam  Vitals signs reviewed  Constitutional:       General: She is not in acute distress  Appearance: Normal appearance  She is well-developed  She is obese  HENT:      Head: Normocephalic  Nose: Nose normal       Mouth/Throat:      Mouth: Mucous membranes are moist       Pharynx: Oropharynx is clear  No oropharyngeal exudate  Comments: Mallampati score is 1  Eyes:      Conjunctiva/sclera: Conjunctivae normal       Pupils: Pupils are equal, round, and reactive to light  Neck:      Musculoskeletal: Neck supple  Vascular: No JVD  Trachea: No tracheal deviation  Cardiovascular:      Rate and Rhythm: Normal rate and regular rhythm  Heart sounds: Normal heart sounds  Pulmonary:      Effort: Pulmonary effort is normal       Comments: Lung sounds are clear  No wheezes, crackles or rhonchi  Abdominal:      General: There is no distension  Palpations: Abdomen is soft  Tenderness: There is no abdominal tenderness  There is no guarding     Musculoskeletal: Comments: No edema, cyanosis or clubbing   Lymphadenopathy:      Cervical: No cervical adenopathy  Skin:     General: Skin is warm and dry  Findings: No rash  Neurological:      Mental Status: She is alert and oriented to person, place, and time  Psychiatric:         Behavior: Behavior normal          Thought Content:  Thought content normal        Peak flow measurement:    Predicted range 380 to 480 l/m    Measured peak flow was 390 l/m today      Answers for HPI/ROS submitted by the patient on 2/19/2021   Primary symptoms  Do you have chest tightness?: Yes  Chronicity: chronic  When did you first notice your symptoms?: more than 1 year ago  How often do your symptoms occur?: 2 to 4 times per day  Since you first noticed this problem, how has it changed?: gradually worsening  Do you have shortness of breath that occurs with effort or exertion?: Yes  Do you have ear congestion?: No  Do you have heartburn?: No  Do you have fatigue?: No  Do you have nasal congestion?: Yes  Do you have shortness of breath when lying flat?: No  Do you have shortness of breath when you wake up?: No  Do you have sweats?: No  Have you experienced weight loss?: No  Which of the following makes your symptoms worse?: change in weather, climbing stairs, exercise, pollen  Which of the following makes your symptoms better?: nothing

## 2021-02-26 NOTE — PATIENT INSTRUCTIONS
Monitor peak flow rates daily    Try albuterol inhaler 2 puffs ever 4 hours as needed    Possible asthma    Methacholine Provocation or Challenge Study    Try Prilosec or protonix daily for 2 weeks    Call after allergist sees you    Back Line   [phone]     May consider steroid nasal spray at bedtime - Nasacort or Flonase 2 stprays each nostril at bedtime

## 2021-03-01 NOTE — ASSESSMENT & PLAN NOTE
She can try Claritin or over-the-counter Flonase or Nasacort steroid nasal spray for allergic rhinitis and postnasal drainage

## 2021-03-01 NOTE — ASSESSMENT & PLAN NOTE
Possibility of cough variant asthma  Recent Fe NO testing  Showed measurement of 59 parts per billion which is elevated  This was suggests possible underlying airway inflammation due to possible asthma  I did offer to put her on trial of a Laba-steroid inhaler to see if this would help her cough  Also told her that if she wanted to methacholine challenge study this was another option  She can use Ventolin inhaler 2 puffs every 4 hours as needed for cough or chest tightness  Cough may also be due to chronic rhinitis with postnasal drainage  She does have known history of allergies  Barbara Messer  wanted to hold off on further testing for now and compare trial of a steroid -Laba inhaler until she sees allergist Dr Alea Hanley  Next week  She has an appointment on March 3rd  I did give her a peak flow meter today  Measure peak flow rate was 390 liters/minute with predicted range of 380-480 liters/minute  I did instruct her to monitor peak flow rates over the next few days     I did instruct her to try an over-the-counter antacids such as Prilosec or Protonix daily for next couple weeks to see if this helps her cough  If so this may indicate that she has LPR-GERD as cause of cough

## 2021-04-01 ENCOUNTER — TELEPHONE (OUTPATIENT)
Dept: OBGYN CLINIC | Facility: CLINIC | Age: 26
End: 2021-04-01

## 2021-04-01 DIAGNOSIS — Z80.41 FAMILY HISTORY OF OVARIAN CANCER: Primary | ICD-10-CM

## 2021-04-01 NOTE — TELEPHONE ENCOUNTER
Calling to see if she can get genetic testing for cancer  States mother hx of cervical, Aunt hx of ovarian, cousin hx of lung cancer  Advised cervical cancer is not genetic  Advised provider is out of the office until 4/6/2021, patient states she will await answer from provider  Does she qualify for testing if so do we order or Oncology? Routing to provider for advice

## 2021-04-02 ENCOUNTER — TELEPHONE (OUTPATIENT)
Dept: FAMILY MEDICINE CLINIC | Facility: CLINIC | Age: 26
End: 2021-04-02

## 2021-04-02 ENCOUNTER — TELEMEDICINE (OUTPATIENT)
Dept: FAMILY MEDICINE CLINIC | Facility: CLINIC | Age: 26
End: 2021-04-02
Payer: COMMERCIAL

## 2021-04-02 DIAGNOSIS — B34.9 VIRAL INFECTION, UNSPECIFIED: ICD-10-CM

## 2021-04-02 DIAGNOSIS — B34.9 VIRAL INFECTION, UNSPECIFIED: Primary | ICD-10-CM

## 2021-04-02 PROCEDURE — 99213 OFFICE O/P EST LOW 20 MIN: CPT | Performed by: NURSE PRACTITIONER

## 2021-04-02 PROCEDURE — U0003 INFECTIOUS AGENT DETECTION BY NUCLEIC ACID (DNA OR RNA); SEVERE ACUTE RESPIRATORY SYNDROME CORONAVIRUS 2 (SARS-COV-2) (CORONAVIRUS DISEASE [COVID-19]), AMPLIFIED PROBE TECHNIQUE, MAKING USE OF HIGH THROUGHPUT TECHNOLOGIES AS DESCRIBED BY CMS-2020-01-R: HCPCS | Performed by: NURSE PRACTITIONER

## 2021-04-02 PROCEDURE — U0005 INFEC AGEN DETEC AMPLI PROBE: HCPCS | Performed by: NURSE PRACTITIONER

## 2021-04-02 PROCEDURE — 1036F TOBACCO NON-USER: CPT | Performed by: NURSE PRACTITIONER

## 2021-04-02 NOTE — TELEPHONE ENCOUNTER
Reviewed she is appropriate for screening based on family history of ovarian cancer  She is not available on Tuesday/Thursday therefore she was given referral for Gyn/onc for genetic counseling  Advised she needs to know family history prior to appt  Dx of cancer in any family member, age at dx, current age or age at death  She should not eat, drink, smoke, chew gum or kiss anyone as least one hour prior to testing  Verbalized understanding  Routing to provider to update

## 2021-04-02 NOTE — TELEPHONE ENCOUNTER
She is appropriate for screening based on family history of ovarian cancer  We can only test for ovarian/breast cancer  She is welcome to schedule an appt with us or with genetic counselor  She should know her family history prior to an appt  Diagnosis of cancer in any family member, age at diagnosis, their current age or age at death  She should not eat, drink, smoke, chew gum or kiss anyone at least one hour prior to testing

## 2021-04-02 NOTE — PROGRESS NOTES
COVID-19 Outpatient Progress Note    Assessment/Plan:    Problem List Items Addressed This Visit     None      Visit Diagnoses     Viral infection, unspecified    -  Primary    Relevant Orders    Novel Coronavirus (Covid-19),PCR [facility] - Collected at [facility] or [facility]         Disposition:     I referred patient to one of our centralized sites for a COVID-19 swab  To quarantine until results are available  Recommended Flonase, Mucinex, and saline nasal spray    I have spent 7 minutes directly with the patient  Encounter provider ALYSSA Tinoco    Provider located at  [address]  [place] [address]  [address] [place] [id number]    Recent Visits  No visits were found meeting these conditions  Showing recent visits within past 7 days and meeting all other requirements     Today's Visits  Date Type Provider Dept   04/02/21 Telephone Bernard Damian MD [address] [place] [address]   04/02/21 [address] [place] [address] today's visits and meeting all other requirements     Future Appointments  No visits were found meeting these conditions  Showing future appointments within next 150 days and meeting all other requirements      This virtual check-in was done via [employer] and patient was informed that this is not a secure, HIPAA-compliant platform  She agrees to proceed  Patient agrees to participate in a virtual check in via telephone or video visit instead of presenting to the office to address urgent/immediate medical needs  Patient is aware this is a billable service  After connecting through [facility], the patient was identified by name and date of birth  Ella Liz was informed that this was a telemedicine visit and that the exam was being conducted confidentially over secure lines  My office door was closed  No one else was in the room   Ella Liz acknowledged consent and understanding of privacy and security of the telemedicine visit  I informed the patient that I have reviewed her record in Epic and presented the opportunity for her to ask any questions regarding the visit today  The patient agreed to participate  Subjective:   Aristides Mcclelland is a 31 y o  female who is concerned about COVID-19  Patient's symptoms include chills, nasal congestion, sore throat and headache  Patient denies fever, fatigue, malaise, rhinorrhea, anosmia, loss of taste, cough, shortness of breath, chest tightness, abdominal pain, nausea, vomiting, diarrhea and myalgias  Date of symptom onset: 3/29/2021    Exposure:   Contact with a person who is under investigation (PUI) for or who is positive for COVID-19 within the last 14 days?: No    Hospitalized recently for fever and/or lower respiratory symptoms?: No      Currently a healthcare worker that is involved in direct patient care?: No      Works in a special setting where the risk of COVID-19 transmission may be high? (this may include long-term care, correctional and longterm facilities; homeless shelters; assisted-living facilities and group homes ): No      Resident in a special setting where the risk of COVID-19 transmission may be high? (this may include long-term care, correctional and longterm facilities; homeless shelters; assisted-living facilities and group homes ): No      Pt started with a headache and sore throat  Now has chills and congestion  Unsure if this is allergy related  No sick contacts    Lab Results   Component Value Date    SARSCOV2 Not Detected 12/15/2020     History reviewed  No pertinent past medical history    Past Surgical History:   Procedure Laterality Date    FRACTURE SURGERY Right 2004    hip    HIP PINNING Right     Right hip pinning for fracture of growth plate at age 7     Current Outpatient Medications   Medication Sig Dispense Refill    albuterol (Ventolin HFA) 90 mcg/act inhaler Inhale 2 puffs every 4 (four) hours as needed for wheezing or shortness of breath 18 g 6    Loratadine (ALAVERT PO) Take by mouth       No current facility-administered medications for this visit  Allergies   Allergen Reactions    Latex - Food Allergy     Penicillins Hives    Toradol [Ketorolac Tromethamine]     Singulair [Montelukast] Headache       Review of Systems   Constitutional: Positive for chills  Negative for fatigue and fever  HENT: Positive for congestion and sore throat  Negative for rhinorrhea  Respiratory: Negative for cough, chest tightness and shortness of breath  Gastrointestinal: Negative for abdominal pain, diarrhea, nausea and vomiting  Musculoskeletal: Negative for myalgias  Neurological: Positive for headaches  Objective: There were no vitals filed for this visit  Physical Exam  Vitals signs and nursing note reviewed  Constitutional:       General: She is not in acute distress  Appearance: She is well-developed  She is not diaphoretic  Eyes:      Conjunctiva/sclera: Conjunctivae normal    Pulmonary:      Effort: Pulmonary effort is normal  No respiratory distress  Neurological:      Mental Status: She is alert  Psychiatric:         Mood and Affect: Mood normal          Behavior: Behavior normal        VIRTUAL VISIT DISCLAIMER    Cyndie Bryan acknowledges that she has consented to an online visit or consultation  She understands that the online visit is based solely on information provided by her, and that, in the absence of a face-to-face physical evaluation by the physician, the diagnosis she receives is both limited and provisional in terms of accuracy and completeness  This is not intended to replace a full medical face-to-face evaluation by the physician  Cyndie Bryan understands and accepts these terms

## 2021-04-03 LAB — SARS-COV-2 RNA RESP QL NAA+PROBE: NEGATIVE

## 2021-04-09 ENCOUNTER — TELEPHONE (OUTPATIENT)
Dept: SURGICAL ONCOLOGY | Facility: CLINIC | Age: 26
End: 2021-04-09

## 2021-04-09 ENCOUNTER — TELEPHONE (OUTPATIENT)
Dept: HEMATOLOGY ONCOLOGY | Facility: CLINIC | Age: 26
End: 2021-04-09

## 2021-04-09 NOTE — TELEPHONE ENCOUNTER
Genetics New Patient Intake Form     Patient Details:     Marylou Ferrari     1995     [id number]     Background Information:           Who is calling to schedule?                                            self   If not self, relationship to patient? self   Referring Provider Josie Coyne   Is the referral marked STAT No   Is patient newly diagnosed with cancer, have metastatic disease, or pending surgery? No   If yes, which is it? If the patient is pending surgery Needs to be scheduled within 48 hours   If none available, schedule patient then email Stat cancer genetics   If the patient is metastatic or newly diagnosed Needs to be scheduled within 2 weeks   If none available, schedule patient then email Stat cancer genetics   Have you had genetic testing that showed a positive genetic mutation? (If yes, schedule within 2 weeks or email STAT cancer genetics) No   Has your family member had genetic testing that resulted in a positive genetic mutation? (If yes in the last 6 months, schedule within 3 weeks )  (If yes but over 6 months, schedule as usual) No   Is this a personal or family history?  family   What is the type of tumor? Family hx of ovarian - maternal aunt   Scheduling Information:   Preferred [place]: [facility]   Are there any dates/time the patient cannot be seen? No   Did the patient schedule an appointment?  Yes   If yes, list appointment date and provider name 7/14/21  lisa   If no, briefly state why    Miscellaneous    After completing the above information, please route to Oncology Genetics

## 2021-04-09 NOTE — TELEPHONE ENCOUNTER
Please contact patient to schedule Genetics Consult  Referral in [facility]  Thank you

## 2021-05-07 ENCOUNTER — IMMUNIZATIONS (OUTPATIENT)
Dept: OBGYN CLINIC | Facility: CLINIC | Age: 26
End: 2021-05-07
Payer: COMMERCIAL

## 2021-05-07 VITALS — BODY MASS INDEX: 34.78 KG/M2 | DIASTOLIC BLOOD PRESSURE: 86 MMHG | SYSTOLIC BLOOD PRESSURE: 124 MMHG | WEIGHT: 209 LBS

## 2021-05-07 DIAGNOSIS — Z23 NEED FOR HPV VACCINATION: Primary | ICD-10-CM

## 2021-05-07 PROCEDURE — 90471 IMMUNIZATION ADMIN: CPT | Performed by: OBSTETRICS & GYNECOLOGY

## 2021-05-07 PROCEDURE — 90651 9VHPV VACCINE 2/3 DOSE IM: CPT | Performed by: OBSTETRICS & GYNECOLOGY

## 2021-05-07 PROCEDURE — 96372 THER/PROPH/DIAG INJ SC/IM: CPT | Performed by: OBSTETRICS & GYNECOLOGY

## 2021-05-12 PROBLEM — J45.991 COUGH VARIANT ASTHMA: Status: ACTIVE | Noted: 2021-05-12

## 2021-05-12 PROBLEM — J30.89 NON-SEASONAL ALLERGIC RHINITIS: Status: ACTIVE | Noted: 2019-08-14

## 2021-07-07 ENCOUNTER — OFFICE VISIT (OUTPATIENT)
Dept: FAMILY MEDICINE CLINIC | Facility: CLINIC | Age: 26
End: 2021-07-07
Payer: COMMERCIAL

## 2021-07-07 VITALS
HEIGHT: 66 IN | HEART RATE: 76 BPM | SYSTOLIC BLOOD PRESSURE: 112 MMHG | RESPIRATION RATE: 18 BRPM | WEIGHT: 213 LBS | BODY MASS INDEX: 34.23 KG/M2 | DIASTOLIC BLOOD PRESSURE: 82 MMHG | TEMPERATURE: 97 F

## 2021-07-07 DIAGNOSIS — S40.021A CONTUSION OF RIGHT UPPER ARM, INITIAL ENCOUNTER: Primary | ICD-10-CM

## 2021-07-07 PROCEDURE — 1036F TOBACCO NON-USER: CPT | Performed by: FAMILY MEDICINE

## 2021-07-07 PROCEDURE — 3008F BODY MASS INDEX DOCD: CPT | Performed by: FAMILY MEDICINE

## 2021-07-07 PROCEDURE — 99213 OFFICE O/P EST LOW 20 MIN: CPT | Performed by: FAMILY MEDICINE

## 2021-07-07 NOTE — PROGRESS NOTES
Assessment/Plan:    1  Contusion of right upper arm, initial encounter        Pt advised to ice area  Watch fopr hard area , heat, etc   No area to open up and drain at this time  Will follow if this happens    There are no Patient Instructions on file for this visit  No follow-ups on file  Subjective:      Patient ID: Deshawn Salcedo is a 31 y o  female  Chief Complaint   Patient presents with    bruise     jmcma       Pt states this am she was taking out her dogs - the latch that was on the door for the gate she ran into it  RT upper arm is bruised   Area hurts  The following portions of the patient's history were reviewed and updated as appropriate: allergies, current medications, past family history, past medical history, past social history, past surgical history and problem list     Review of Systems   Constitutional: Negative  Negative for activity change, appetite change, chills, diaphoresis and fatigue  HENT: Negative  Negative for dental problem, ear pain, sinus pressure and sore throat  Eyes: Negative  Negative for photophobia, pain, discharge, redness, itching and visual disturbance  Respiratory: Negative for apnea and chest tightness  Cardiovascular: Negative  Negative for chest pain, palpitations and leg swelling  Gastrointestinal: Negative  Negative for abdominal distention, abdominal pain, constipation and diarrhea  Endocrine: Negative  Negative for cold intolerance and heat intolerance  Genitourinary: Negative  Negative for difficulty urinating and dyspareunia  Musculoskeletal: Negative  Negative for arthralgias and back pain  Skin: Positive for color change  Allergic/Immunologic: Negative for environmental allergies  Neurological: Negative  Negative for dizziness  Psychiatric/Behavioral: Negative  Negative for agitation           Current Outpatient Medications   Medication Sig Dispense Refill    albuterol (Ventolin HFA) 90 mcg/act inhaler Inhale 2 puffs every 4 (four) hours as needed for wheezing or shortness of breath 18 g 6    Loratadine (ALAVERT PO) Take by mouth (Patient not taking: Reported on 7/7/2021)      mometasone-formoterol (DULERA) 100-5 MCG/ACT inhaler Inhale 2 puffs 2 (two) times a day Rinse mouth after use  (Patient not taking: Reported on 7/7/2021) 13 g 11     No current facility-administered medications for this visit  Objective:    /82   Pulse 76   Temp (!) 97 °F (36 1 °C)   Resp 18   Ht 5' 6" (1 676 m)   Wt 96 6 kg (213 lb)   LMP 05/02/2021 (Exact Date)   BMI 34 38 kg/m²        Physical Exam  Vitals and nursing note reviewed  Constitutional:       General: She is not in acute distress  Appearance: She is well-developed  She is not diaphoretic  HENT:      Head: Normocephalic and atraumatic  Right Ear: External ear normal       Left Ear: External ear normal       Nose: Nose normal       Mouth/Throat:      Pharynx: No oropharyngeal exudate  Eyes:      General: No scleral icterus  Right eye: No discharge  Left eye: No discharge  Pupils: Pupils are equal, round, and reactive to light  Neck:      Thyroid: No thyromegaly  Cardiovascular:      Rate and Rhythm: Normal rate  Heart sounds: Normal heart sounds  No murmur heard  Pulmonary:      Effort: Pulmonary effort is normal  No respiratory distress  Breath sounds: Normal breath sounds  No wheezing  Abdominal:      General: Bowel sounds are normal  There is no distension  Palpations: Abdomen is soft  There is no mass  Tenderness: There is no abdominal tenderness  There is no guarding or rebound  Musculoskeletal:         General: Normal range of motion  Skin:     General: Skin is warm and dry  Findings: Bruising present  No erythema or rash  Neurological:      Mental Status: She is alert        Coordination: Coordination normal       Deep Tendon Reflexes: Reflexes normal    Psychiatric:         Behavior: Behavior normal                 Milbert Cohen, DO

## 2021-07-09 ENCOUNTER — TELEPHONE (OUTPATIENT)
Dept: GENETICS | Facility: CLINIC | Age: 26
End: 2021-07-09

## 2021-07-14 ENCOUNTER — DOCUMENTATION (OUTPATIENT)
Dept: GENETICS | Facility: CLINIC | Age: 26
End: 2021-07-14

## 2021-07-14 ENCOUNTER — CLINICAL SUPPORT (OUTPATIENT)
Dept: GENETICS | Facility: CLINIC | Age: 26
End: 2021-07-14

## 2021-07-14 ENCOUNTER — TELEPHONE (OUTPATIENT)
Dept: GENETICS | Facility: CLINIC | Age: 26
End: 2021-07-14

## 2021-07-14 DIAGNOSIS — Z80.41 FAMILY HISTORY OF OVARIAN CANCER: Primary | ICD-10-CM

## 2021-07-14 PROCEDURE — NC001 PR NO CHARGE: Performed by: GENETIC COUNSELOR, MS

## 2021-07-14 NOTE — PROGRESS NOTES
Pre-Test Genetic Counseling Consult Note    Patient Name: Mable Carolina   /Age: 1995/ y o  Referring Provider: ALYSSA Hopkins    Date of Service: 2021  Genetic Counselor: Nancy Blanco MS, [facility]  Interpretation Services: None  Location: Telephone consult   Length of Visit: 61 minutes      Luther Montano was referred to the [address] [place] [address] and Genetic Assessment Program due to her family history of ovarian cancer  she presents today to discuss the possibility of a hereditary cancer syndrome, options for genetic testing, and implications for her and her family  Cancer History and Treatment:   Personal History: no personal history of cancer    Screening Hx:   Breast:  Clinical breast exam: regularly, normal  Mammogram: none  Breast biopsy: none    Colon:  Colonoscopy: none    Gynecologic:  Pelvic/Pap exam on 10/14/20, normal  Hx of cyst on ovary, was having TVUS until it disappeared   Ovaries/Uterus intact    Skin:  Skin cancer screening: none    Other screening: none    Reproductive History  Age at menarche: 12  Parity:   Fertility Medication: none   Age at first live birth: na  : na  Oral Contraception: none  Menopause: premenopausal  Hormone replacement: none    Medical and Surgical History  Pertinent surgical history:   Past Surgical History:   Procedure Laterality Date    FRACTURE SURGERY Right 2004    hip    HIP PINNING Right     Right hip pinning for fracture of growth plate at age 7      Pertinent medical history:  Past Medical History:   Diagnosis Date    Wears glasses          Other History:  Height:   Ht Readings from Last 1 Encounters:   21 5' 6" (1 676 m)     Weight:   Wt Readings from Last 1 Encounters:   21 96 6 kg (213 lb)       Relevant Family History     Reported Ancestry: [place], no Landy Cintron has 2 sisters; they are 35 and 27  Maternal Family History:  Trina's mother passed away recently at 72 from cervical cancer diagnosed at 59   Her maternal uncle passed away from throat cancer and had a history of exposure to agent orange  Trina's maternal aunt passed away from ovarian cancer in her 62s (she was diagnosed in her 62s as well)  Her son, Trina's first cousin, passed away from testicular cancer at 55  Paternal Family History:   Sandi Smith does not know her paternal family history as her father was adopted  He is 72 and has no personal history of cancer  Please refer to the scanned pedigree in the Media Tab for a complete family history     *All history is reported as provided by the patient; records are not available for review, except where indicated  Assessment:  We discussed sporadic, familial and hereditary cancer  We also discussed the many factors that influence our risk for cancer such as age, environmental exposures, lifestyle choices and family history  We reviewed the indications suggestive of a hereditary predisposition to cancer  Genetic testing is indicated for Sandi Smith based on the following criteria: Meets NCCN V2 2021 Testing Criteria for High-Penetrance Breast and/or Ovarian Cancer Susceptibility Genes: family history of a second-degree relative diagnosed with ovarian cancer    The risks, benefits, and limitations of genetic testing were reviewed with the patient, as well as genetic discrimination laws, and possible test results (positive, negative, variants of uncertain significance) and their clinical implications  If positive for a mutation, options for managing cancer risk including increased surveillance, chemoprevention, and in some cases prophylactic surgery were discussed  Sandi Smith was informed that if a hereditary cancer syndrome was identified in her, first degree relatives (parents, siblings, and children) have a chance of also inheriting the condition  Genetic testing would allow for predictive genetic testing in other relatives, who may also be at risk depending on their degree of relation       Plan: Patient decided to proceed with testing and provided consent  Summary:     Sample Collection:  A blood kit was mailed home to the patient    Genetic Testing Preformed: CancerNext (36 genes): APC, MIKAYLA, AXIN2 BARD1, BRCA1, BRCA2, BRIP1, BMPR1A, CDH1, CDK4, CDKN2A, CHEK2, DICER1, EPCAM, GREM1, HOXB13, MLH1, MSH2, MSH3, MSH6, MUTYH, NBN, NF1, NTHL1, PALB2, PMS2, POLD1, POLE, PTEN, RAD51C, RAD51D, RECQL SMAD4, SMARCA4, STK11, TP53      Results take approximately 2-3 weeks to complete once test is started  We will contact Ba Dillard once results are available  Additional recommendations for surveillance/medical management will be made pending genetic test results

## 2021-07-14 NOTE — LETTER
2021     Jacob LaboySergons[address]   [address] [address]    Patient: Beatriz Clark  YOB: 1995  Date of Visit: 2021      Dear Dr Lorraine Hayes: Thank you for referring Meghan Houston to me for evaluation  Below are my notes for this consultation  If you have questions, please do not hesitate to call me  I look forward to following your patient along with you  Sincerely,        Javier Melgar        CC: Bettie Otero MD        Pre-Test Genetic Counseling Consult Note    Patient Name: Beatriz Clark   /Age: 1995/26 y o  Referring Provider: ALYSSA Dowell    Date of Service: 2021  Genetic Counselor: Javier Melgar MS, [address] [facility]  Interpretation Services: None  Location: Telephone consult   Length of Visit: 61 minutes      Hal was referred to the [address] [place] [address] and Genetic Assessment Program due to her family history of ovarian cancer  she presents today to discuss the possibility of a hereditary cancer syndrome, options for genetic testing, and implications for her and her family          Cancer History and Treatment:   Personal History: no personal history of cancer    Screening Hx:   Breast:  Clinical breast exam: regularly, normal  Mammogram: none  Breast biopsy: none    Colon:  Colonoscopy: none    Gynecologic:  Pelvic/Pap exam on 10/14/20, normal  Hx of cyst on ovary, was having TVUS until it disappeared   Ovaries/Uterus intact    Skin:  Skin cancer screening: none    Other screening: none    Reproductive History  Age at menarche: 12  Parity:   Fertility Medication: none   Age at first live birth: na  : na  Oral Contraception: none  Menopause: premenopausal  Hormone replacement: none    Medical and Surgical History  Pertinent surgical history:   Past Surgical History:   Procedure Laterality Date    FRACTURE SURGERY Right 2004    hip    HIP PINNING Right     Right hip pinning for fracture of growth plate at age 7      Pertinent medical history:  Past Medical History:   Diagnosis Date    Wears glasses          Other History:  Height:   Ht Readings from Last 1 Encounters:   07/07/21 5' 6" (1 676 m)     Weight:   Wt Readings from Last 1 Encounters:   07/07/21 96 6 kg (213 lb)       Relevant Family History     Reported Ancestry: vilma Naylor has 2 sisters; they are 35 and 27  Maternal Family History:  Trina's mother passed away recently at 72 from cervical cancer diagnosed at 59  Her maternal uncle passed away from throat cancer and had a history of exposure to agent orange  Trina's maternal aunt passed away from ovarian cancer in her 62s (she was diagnosed in her 62s as well)  Her son, Trina's first cousin, passed away from testicular cancer at 55  Paternal Family History:   Ernie Guardado does not know her paternal family history as her father was adopted  He is 72 and has no personal history of cancer  Please refer to the scanned pedigree in the Media Tab for a complete family history     *All history is reported as provided by the patient; records are not available for review, except where indicated  Assessment:  We discussed sporadic, familial and hereditary cancer  We also discussed the many factors that influence our risk for cancer such as age, environmental exposures, lifestyle choices and family history  We reviewed the indications suggestive of a hereditary predisposition to cancer  Genetic testing is indicated for Ernie Guardado based on the following criteria: Meets NCCN V2 2021 Testing Criteria for High-Penetrance Breast and/or Ovarian Cancer Susceptibility Genes: family history of a second-degree relative diagnosed with ovarian cancer    The risks, benefits, and limitations of genetic testing were reviewed with the patient, as well as genetic discrimination laws, and possible test results (positive, negative, variants of uncertain significance) and their clinical implications   If positive for a mutation, options for managing cancer risk including increased surveillance, chemoprevention, and in some cases prophylactic surgery were discussed  Cathy Morel was informed that if a hereditary cancer syndrome was identified in her, first degree relatives (parents, siblings, and children) have a chance of also inheriting the condition  Genetic testing would allow for predictive genetic testing in other relatives, who may also be at risk depending on their degree of relation  Plan: Patient decided to proceed with testing and provided consent  Summary:     Sample Collection:  A blood kit was mailed home to the patient    Genetic Testing Preformed: CancerNext (36 genes): APC, MIKAYLA, AXIN2 BARD1, BRCA1, BRCA2, BRIP1, BMPR1A, CDH1, CDK4, CDKN2A, CHEK2, DICER1, EPCAM, GREM1, HOXB13, MLH1, MSH2, MSH3, MSH6, MUTYH, NBN, NF1, NTHL1, PALB2, PMS2, POLD1, POLE, PTEN, RAD51C, RAD51D, RECQL SMAD4, SMARCA4, STK11, TP53      Results take approximately 2-3 weeks to complete once test is started  We will contact Cathy Morel once results are available  Additional recommendations for surveillance/medical management will be made pending genetic test results

## 2021-08-05 ENCOUNTER — TELEPHONE (OUTPATIENT)
Dept: FAMILY MEDICINE CLINIC | Facility: CLINIC | Age: 26
End: 2021-08-05

## 2021-08-05 ENCOUNTER — OFFICE VISIT (OUTPATIENT)
Dept: FAMILY MEDICINE CLINIC | Facility: CLINIC | Age: 26
End: 2021-08-05
Payer: COMMERCIAL

## 2021-08-05 VITALS
BODY MASS INDEX: 36.37 KG/M2 | HEIGHT: 64 IN | SYSTOLIC BLOOD PRESSURE: 100 MMHG | WEIGHT: 213 LBS | HEART RATE: 78 BPM | DIASTOLIC BLOOD PRESSURE: 70 MMHG | RESPIRATION RATE: 16 BRPM | TEMPERATURE: 97.9 F | OXYGEN SATURATION: 98 %

## 2021-08-05 DIAGNOSIS — R05.9 COUGH: Primary | ICD-10-CM

## 2021-08-05 DIAGNOSIS — Z13.29 SCREENING FOR THYROID DISORDER: ICD-10-CM

## 2021-08-05 DIAGNOSIS — Z13.220 SCREENING FOR LIPID DISORDERS: ICD-10-CM

## 2021-08-05 DIAGNOSIS — M25.472 LEFT ANKLE SWELLING: ICD-10-CM

## 2021-08-05 DIAGNOSIS — Z00.00 PREVENTATIVE HEALTH CARE: ICD-10-CM

## 2021-08-05 DIAGNOSIS — J45.991 COUGH VARIANT ASTHMA: ICD-10-CM

## 2021-08-05 DIAGNOSIS — Z76.89 ENCOUNTER TO ESTABLISH CARE: ICD-10-CM

## 2021-08-05 DIAGNOSIS — Z13.1 SCREENING FOR DIABETES MELLITUS: ICD-10-CM

## 2021-08-05 DIAGNOSIS — Z13.0 SCREENING FOR DEFICIENCY ANEMIA: ICD-10-CM

## 2021-08-05 PROBLEM — Z13.89 SCREENING FOR CARDIOVASCULAR, RESPIRATORY, AND GENITOURINARY DISEASES: Status: RESOLVED | Noted: 2019-08-14 | Resolved: 2021-08-05

## 2021-08-05 PROBLEM — Z13.6 SCREENING FOR CARDIOVASCULAR, RESPIRATORY, AND GENITOURINARY DISEASES: Status: RESOLVED | Noted: 2019-08-14 | Resolved: 2021-08-05

## 2021-08-05 PROBLEM — Z13.83 SCREENING FOR CARDIOVASCULAR, RESPIRATORY, AND GENITOURINARY DISEASES: Status: RESOLVED | Noted: 2019-08-14 | Resolved: 2021-08-05

## 2021-08-05 PROBLEM — R10.32 LLQ PAIN: Status: RESOLVED | Noted: 2019-08-14 | Resolved: 2021-08-05

## 2021-08-05 PROCEDURE — 1036F TOBACCO NON-USER: CPT | Performed by: NURSE PRACTITIONER

## 2021-08-05 PROCEDURE — 3008F BODY MASS INDEX DOCD: CPT | Performed by: NURSE PRACTITIONER

## 2021-08-05 PROCEDURE — 99204 OFFICE O/P NEW MOD 45 MIN: CPT | Performed by: NURSE PRACTITIONER

## 2021-08-05 PROCEDURE — 36415 COLL VENOUS BLD VENIPUNCTURE: CPT | Performed by: NURSE PRACTITIONER

## 2021-08-05 PROCEDURE — 3725F SCREEN DEPRESSION PERFORMED: CPT | Performed by: NURSE PRACTITIONER

## 2021-08-05 NOTE — PROGRESS NOTES
Assessment/Plan:    1  Cough  Assessment & Plan:  Reviewed CXR and pulmonology work up  Symptoms not improved with use of albuterol inhaler  Symptoms worse when lying down and in the morning, but also has symptoms all day  Recommend pt trial pepcid OTC for possible silent reflux x's 10 days  RTO for recheck and discussion  Possible cough variant asthma and should consider advair use daily  Will discuss at next office visit  Orders:  -     CBC and differential  -     Comprehensive metabolic panel  -     TSH, 3rd generation  -     Lipid panel  -     Sedimentation rate, automated    2  Cough variant asthma  Assessment & Plan:  No recent albuterol inhaler use  Reports asthma symptoms are well-controlled  3  Left ankle swelling  Assessment & Plan:  Recommend elevation  Sodium reduction  Consider consult with vascular  Pt did have US from previous PCP, no completed  Encourage follow up as directed  Will discuss further at next office visit in about 10 days  Orders:  -     Compression Stocking  -     CBC and differential  -     Comprehensive metabolic panel  -     TSH, 3rd generation  -     Lipid panel  -     Sedimentation rate, automated    4  Preventative health care  -     CBC and differential  -     Comprehensive metabolic panel  -     TSH, 3rd generation  -     Lipid panel  -     Sedimentation rate, automated    5  Screening for deficiency anemia  -     CBC and differential    6  Screening for diabetes mellitus  -     Comprehensive metabolic panel    7  Screening for thyroid disorder  -     TSH, 3rd generation    8  Screening for lipid disorders  -     Lipid panel    9  Encounter to establish care          Patient Instructions   Take pepcid OTC and monitor for improvement in cough symptoms      Return in about 10 days (around 8/15/2021) for Recheck  Subjective:      Patient ID: Janene Mooney is a 31 y o  female      Chief Complaint   Patient presents with   [facility]     lt lower leg/ankle swelling     sits most of the day at work - noticed swelling - tried knee high compresson socks - difficult to get comfortable at night, legs restless  no relief with Tylenol       Connie Pike is a 31year old female who presents to the office to establish care with a new primary care provider  Reports chronic issues including left ankle swelling and chronic cough  Denies fever, chills, chest pain, shortness of breath, n/v/d  The following portions of the patient's history were reviewed and updated as appropriate: allergies, current medications, past family history, past medical history, past social history, past surgical history and problem list     Review of Systems   Constitutional: Negative for chills, fatigue and fever  HENT: Negative for postnasal drip  Respiratory: Positive for cough (chronic, daily and intermittent)  Negative for chest tightness and shortness of breath  Cardiovascular: Positive for leg swelling (left ankle swelling, mostly at night)  Negative for chest pain and palpitations  Gastrointestinal: Negative for nausea and vomiting  Denies heartburn         Current Outpatient Medications   Medication Sig Dispense Refill    albuterol (Ventolin HFA) 90 mcg/act inhaler Inhale 2 puffs every 4 (four) hours as needed for wheezing or shortness of breath 18 g 6    Loratadine (ALAVERT PO) Take by mouth daily at bedtime       No current facility-administered medications for this visit  Objective:    /70   Pulse 78   Temp 97 9 °F (36 6 °C) (Temporal)   Resp 16   Ht 5' 4" (1 626 m)   Wt 96 6 kg (213 lb)   SpO2 98%   BMI 36 56 kg/m²        Physical Exam  Vitals reviewed  Constitutional:       General: She is not in acute distress  Appearance: She is well-developed  She is not diaphoretic  HENT:      Head: Normocephalic and atraumatic  Eyes:      General:         Right eye: No discharge  Left eye: No discharge        Conjunctiva/sclera: Conjunctivae normal  Neck:      Thyroid: No thyromegaly  Cardiovascular:      Rate and Rhythm: Normal rate and regular rhythm  Heart sounds: Normal heart sounds  Pulmonary:      Effort: Pulmonary effort is normal  No respiratory distress  Breath sounds: Normal breath sounds  No decreased breath sounds, wheezing, rhonchi or rales  Musculoskeletal:      Cervical back: Normal range of motion and neck supple  Right ankle: Normal       Left ankle: Swelling present  No tenderness  Normal range of motion  Abnormal pulse (decreased)  Lymphadenopathy:      Cervical: No cervical adenopathy  Skin:     General: Skin is warm and dry  Findings: No rash  Neurological:      Mental Status: She is alert and oriented to person, place, and time  Psychiatric:         Behavior: Behavior normal          Thought Content:  Thought content normal          Judgment: Judgment normal                 ALYSSA Steele

## 2021-08-05 NOTE — ASSESSMENT & PLAN NOTE
Recommend elevation  Sodium reduction  Consider consult with vascular  Pt did have US from previous PCP, no completed  Encourage follow up as directed  Will discuss further at next office visit in about 10 days

## 2021-08-05 NOTE — TELEPHONE ENCOUNTER
Pharmacy called and stated that they do not do compression socks  She would need to go through a medical supply store  Informed Naomie Marcial of this information

## 2021-08-05 NOTE — ASSESSMENT & PLAN NOTE
Reviewed CXR and pulmonology work up  Symptoms not improved with use of albuterol inhaler  Symptoms worse when lying down and in the morning, but also has symptoms all day  Recommend pt trial pepcid OTC for possible silent reflux x's 10 days  RTO for recheck and discussion  Possible cough variant asthma and should consider advair use daily  Will discuss at next office visit

## 2021-08-06 LAB
ALBUMIN SERPL-MCNC: 4.1 G/DL (ref 3.6–5.1)
ALBUMIN/GLOB SERPL: 1.2 (CALC) (ref 1–2.5)
ALP SERPL-CCNC: 84 U/L (ref 31–125)
ALT SERPL-CCNC: 10 U/L (ref 6–29)
AST SERPL-CCNC: 18 U/L (ref 10–30)
BILIRUB SERPL-MCNC: 0.3 MG/DL (ref 0.2–1.2)
BUN SERPL-MCNC: 12 MG/DL (ref 7–25)
BUN/CREAT SERPL: ABNORMAL (CALC) (ref 6–22)
CALCIUM SERPL-MCNC: 9.2 MG/DL (ref 8.6–10.2)
CHLORIDE SERPL-SCNC: 106 MMOL/L (ref 98–110)
CHOLEST SERPL-MCNC: 167 MG/DL
CHOLEST/HDLC SERPL: 3 (CALC)
CO2 SERPL-SCNC: 14 MMOL/L (ref 20–32)
CREAT SERPL-MCNC: 0.9 MG/DL (ref 0.5–1.1)
ERYTHROCYTE [SEDIMENTATION RATE] IN BLOOD BY WESTERGREN METHOD: NORMAL MM/H
GLOBULIN SER CALC-MCNC: 3.3 G/DL (CALC) (ref 1.9–3.7)
GLUCOSE SERPL-MCNC: 93 MG/DL (ref 65–99)
HDLC SERPL-MCNC: 56 MG/DL
LDLC SERPL CALC-MCNC: 93 MG/DL (CALC)
NONHDLC SERPL-MCNC: 111 MG/DL (CALC)
POTASSIUM SERPL-SCNC: 4.7 MMOL/L (ref 3.5–5.3)
PROT SERPL-MCNC: 7.4 G/DL (ref 6.1–8.1)
SL AMB EGFR AFRICAN AMERICAN: 102 ML/MIN/1.73M2
SL AMB EGFR NON AFRICAN AMERICAN: 88 ML/MIN/1.73M2
SODIUM SERPL-SCNC: 137 MMOL/L (ref 135–146)
TRIGL SERPL-MCNC: 86 MG/DL
TSH SERPL-ACNC: 2.16 MIU/L
WBC # BLD AUTO: NORMAL THOUSAND/UL

## 2021-08-09 ENCOUNTER — TELEPHONE (OUTPATIENT)
Dept: GENETICS | Facility: CLINIC | Age: 26
End: 2021-08-09

## 2021-08-09 NOTE — TELEPHONE ENCOUNTER
Left message for patient regarding sample that has not been received by the lab for genetic testing  No

## 2021-09-13 ENCOUNTER — OFFICE VISIT (OUTPATIENT)
Dept: FAMILY MEDICINE CLINIC | Facility: CLINIC | Age: 26
End: 2021-09-13
Payer: COMMERCIAL

## 2021-09-13 VITALS
WEIGHT: 212 LBS | RESPIRATION RATE: 16 BRPM | BODY MASS INDEX: 36.19 KG/M2 | TEMPERATURE: 98 F | HEART RATE: 79 BPM | DIASTOLIC BLOOD PRESSURE: 72 MMHG | OXYGEN SATURATION: 98 % | SYSTOLIC BLOOD PRESSURE: 114 MMHG | HEIGHT: 64 IN

## 2021-09-13 DIAGNOSIS — F43.21 GRIEF REACTION: ICD-10-CM

## 2021-09-13 DIAGNOSIS — L08.9 WOUND INFECTION: ICD-10-CM

## 2021-09-13 DIAGNOSIS — T14.8XXA WOUND INFECTION: ICD-10-CM

## 2021-09-13 DIAGNOSIS — R05.9 COUGH: Primary | ICD-10-CM
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
## 2021-09-13 DIAGNOSIS — J30.89 NON-SEASONAL ALLERGIC RHINITIS, UNSPECIFIED TRIGGER: ICD-10-CM

## 2021-09-13 DIAGNOSIS — M25.472 LEFT ANKLE SWELLING: ICD-10-CM

## 2021-09-13 PROBLEM — F43.20 GRIEF REACTION: Status: ACTIVE | Noted: 2021-09-13

## 2021-09-13 PROCEDURE — 99214 OFFICE O/P EST MOD 30 MIN: CPT | Performed by: NURSE PRACTITIONER

## 2021-09-13 NOTE — ASSESSMENT & PLAN NOTE
Prevous rx of dulera inhaler from past PCP and pt reports she did not  this rx  She had improvement in symptoms with use of OTC pepcid but symptoms returned after a few days of use  Advise pantoprazole trial for 8 weeks but pt reports she is unable to swallow pills  Advise she avoid trigger foods for possible silent reflux and we reviewed this in office  Advise that she try dulera inhaler and monitor for improvement  She is in agreement with plan

## 2021-09-13 NOTE — ASSESSMENT & PLAN NOTE
Trina's mother passed away of metastatic cervical cancer  She is currently undergoing grief counseling and reports this is going OK for her  Stable, no issues at this time

## 2021-09-13 NOTE — ASSESSMENT & PLAN NOTE
Small wound infection s/p mosquito bite  Advise washing with warm water and gentle soap    Alternate between bactroban and hydrocortisone cream  Negative

## 2021-09-13 NOTE — PROGRESS NOTES
Assessment/Plan:    1  Cough  Assessment & Plan:  Prevous rx of dulera inhaler from past PCP and pt reports she did not  this rx  She had improvement in symptoms with use of OTC pepcid but symptoms returned after a few days of use  Advise pantoprazole trial for 8 weeks but pt reports she is unable to swallow pills  Advise she avoid trigger foods for possible silent reflux and we reviewed this in office  Advise that she try dulera inhaler and monitor for improvement  She is in agreement with plan  Orders:  -     mometasone-formoterol (Dulera) 100-5 MCG/ACT inhaler; Inhale 2 puffs 2 (two) times a day Rinse mouth after use  2  Left ankle swelling  Assessment & Plan:  Recommend compression stockings as tolerated  Stable, no issues at this time  Orders:  -     VAS lower limb venous duplex study, unilateral/limited; Future; Expected date: 09/13/2021    3  Mosquito bite, sequela  -     hydrocortisone 2 5 % cream; Apply topically 2 (two) times a day    4  Wound infection  Assessment & Plan:  Small wound infection s/p mosquito bite  Advise washing with warm water and gentle soap  Alternate between bactroban and hydrocortisone cream     Orders:  -     mupirocin (BACTROBAN) 2 % ointment; Apply topically 3 (three) times a day    5  Non-seasonal allergic rhinitis, unspecified trigger    6  Grief reaction  Assessment & Plan:  Trina's mother passed away of metastatic cervical cancer  She is currently undergoing grief counseling and reports this is going OK for her  Stable, no issues at this time  BMI Counseling: Body mass index is 36 39 kg/m²  The BMI is above normal  Nutrition recommendations include encouraging healthy choices of fruits and vegetables, decreasing fast food intake, consuming healthier snacks, moderation in carbohydrate intake and increasing intake of lean protein  Exercise recommendations include exercising 3-5 times per week  Reviewed labs with patient        Return in about 3 months (around 12/13/2021) for Recheck  Subjective:      Patient ID: Nelia Cummings is a 31 y o  female  Chief Complaint   Patient presents with    Follow-up     cough   bw  left ankle       Donna Duggan is a 31year old female who returns to the office for review of lab work and discussion of chronic cough and left ankle swelling  Pt reports OTC Pepcid worked well for her cough symptoms but then her cough returned a few days later  Denies production of sputum  Reports her left ankle continues to swell periodically without pain and she has not yet been able to be fitted for compression socks  Reports anxiety r/t potential change in job and she states her current job causes a great deal of stress  She is currently undergoing grief counseling s/p the passing of her mother from cervical cancer  Additionally, she reports mosquito bites that continue to be itchy and are now tender to touch  She has been using hydrocortisone cream without improvement  The following portions of the patient's history were reviewed and updated as appropriate: allergies, current medications, past family history, past medical history, past social history, past surgical history and problem list     Review of Systems   Constitutional: Negative for chills, diaphoresis, fatigue and fever  HENT: Negative for congestion, ear discharge, ear pain, postnasal drip, rhinorrhea, sinus pressure, sinus pain and sore throat  Eyes: Negative for pain and discharge  Respiratory: Positive for cough (chronic, intermittent)  Negative for chest tightness, shortness of breath and wheezing  Cardiovascular: Negative for chest pain  Gastrointestinal: Negative for diarrhea, nausea and vomiting  Musculoskeletal: Positive for joint swelling (left ankle, chronic intermittent)  Negative for arthralgias, back pain, gait problem and myalgias  Skin: Negative for rash  Neurological: Negative for dizziness and headaches     Hematological: Negative for adenopathy  Psychiatric/Behavioral: The patient is nervous/anxious  Current Outpatient Medications   Medication Sig Dispense Refill    albuterol (Ventolin HFA) 90 mcg/act inhaler Inhale 2 puffs every 4 (four) hours as needed for wheezing or shortness of breath 18 g 6    hydrocortisone 2 5 % cream Apply topically 2 (two) times a day 30 g 0    Loratadine (ALAVERT PO) Take by mouth daily at bedtime      mupirocin (BACTROBAN) 2 % ointment Apply topically 3 (three) times a day 22 g 0    mometasone-formoterol (Dulera) 100-5 MCG/ACT inhaler Inhale 2 puffs 2 (two) times a day Rinse mouth after use  13 g 2     No current facility-administered medications for this visit  Objective:    /72   Pulse 79   Temp 98 °F (36 7 °C) (Temporal)   Resp 16   Ht 5' 4" (1 626 m)   Wt 96 2 kg (212 lb)   SpO2 98%   BMI 36 39 kg/m²        Physical Exam  Vitals reviewed  Constitutional:       General: She is not in acute distress  Appearance: She is well-developed  She is not diaphoretic  HENT:      Head: Normocephalic and atraumatic  Eyes:      General:         Right eye: No discharge  Left eye: No discharge  Conjunctiva/sclera: Conjunctivae normal    Neck:      Thyroid: No thyromegaly  Cardiovascular:      Rate and Rhythm: Normal rate and regular rhythm  Heart sounds: Normal heart sounds  Pulmonary:      Effort: Pulmonary effort is normal  No respiratory distress  Breath sounds: Normal breath sounds  No decreased breath sounds, wheezing, rhonchi or rales  Musculoskeletal:      Cervical back: Normal range of motion and neck supple  Lymphadenopathy:      Cervical: No cervical adenopathy  Skin:     General: Skin is warm and dry  Findings: No rash  Comments: Left arm has multiple insect bites, one with dark erythema and yellow, slightly moist appearing center   Neurological:      Mental Status: She is alert and oriented to person, place, and time  Psychiatric:         Behavior: Behavior normal          Thought Content:  Thought content normal          Judgment: Judgment normal                 ALYSSA Sifuentes

## 2021-09-22 ENCOUNTER — TELEMEDICINE (OUTPATIENT)
Dept: FAMILY MEDICINE CLINIC | Facility: CLINIC | Age: 26
End: 2021-09-22
Payer: COMMERCIAL

## 2021-09-22 VITALS — HEIGHT: 64 IN | WEIGHT: 212 LBS | BODY MASS INDEX: 36.19 KG/M2 | TEMPERATURE: 100.4 F

## 2021-09-22 DIAGNOSIS — J34.89 RHINORRHEA: ICD-10-CM

## 2021-09-22 DIAGNOSIS — R05.9 COUGH: ICD-10-CM

## 2021-09-22 DIAGNOSIS — R09.81 HEAD CONGESTION: ICD-10-CM

## 2021-09-22 DIAGNOSIS — R06.00 DYSPNEA ON EXERTION: ICD-10-CM

## 2021-09-22 DIAGNOSIS — Z20.822 CLOSE EXPOSURE TO COVID-19 VIRUS: Primary | ICD-10-CM

## 2021-09-22 PROCEDURE — U0005 INFEC AGEN DETEC AMPLI PROBE: HCPCS | Performed by: NURSE PRACTITIONER

## 2021-09-22 PROCEDURE — 99213 OFFICE O/P EST LOW 20 MIN: CPT | Performed by: NURSE PRACTITIONER

## 2021-09-22 PROCEDURE — U0003 INFECTIOUS AGENT DETECTION BY NUCLEIC ACID (DNA OR RNA); SEVERE ACUTE RESPIRATORY SYNDROME CORONAVIRUS 2 (SARS-COV-2) (CORONAVIRUS DISEASE [COVID-19]), AMPLIFIED PROBE TECHNIQUE, MAKING USE OF HIGH THROUGHPUT TECHNOLOGIES AS DESCRIBED BY CMS-2020-01-R: HCPCS | Performed by: NURSE PRACTITIONER

## 2021-09-24 ENCOUNTER — TELEMEDICINE (OUTPATIENT)
Dept: FAMILY MEDICINE CLINIC | Facility: CLINIC | Age: 26
End: 2021-09-24
Payer: COMMERCIAL

## 2021-09-24 VITALS — BODY MASS INDEX: 36.19 KG/M2 | WEIGHT: 212 LBS | HEIGHT: 64 IN

## 2021-09-24 DIAGNOSIS — U07.1 COVID-19: Primary | ICD-10-CM

## 2021-09-24 PROCEDURE — 99213 OFFICE O/P EST LOW 20 MIN: CPT | Performed by: NURSE PRACTITIONER

## 2021-09-24 PROCEDURE — 3008F BODY MASS INDEX DOCD: CPT | Performed by: NURSE PRACTITIONER

## 2021-09-24 PROCEDURE — 1036F TOBACCO NON-USER: CPT | Performed by: NURSE PRACTITIONER

## 2021-09-24 NOTE — LETTER
September 24, 2021    Patient: Cristopher Fairbanks  YOB: 1995  Date of Last Encounter: 9/22/2021      To whom it may concern:     Cristopher Fairbanks has tested positive for COVID-19 (Coronavirus)  She may return to work on 9/30/21, which is 10 days from illness onset (provided symptoms are improving) and 24 hours without fever      Sincerely,         Alyssia Ackerman

## 2021-09-24 NOTE — PROGRESS NOTES
COVID-19 Outpatient Progress Note    Assessment/Plan:    Problem List Items Addressed This Visit     None      Visit Diagnoses     COVID-19    -  Primary         Disposition:     I recommended continued isolation until at least 24 hours have passed since recovery defined as resolution of fever without the use of fever-reducing medications AND improvement in COVID symptoms AND 10 days have passed since onset of symptoms (or 10 days have passed since date of first positive viral diagnostic test for asymptomatic patients)  I have spent 15 minutes directly with the patient  Greater than 50% of this time was spent in counseling/coordination of care regarding: instructions for management, patient and family education and impressions  Verification of patient location:    Patient is located in the following state in which I hold an active license [place]    Encounter provider Vitaliy Bailey, [facility]    Provider located at [address] [place] [id number]    Recent Visits  Date Type Provider Dept   09/22/21 [address] [place] [address], [facility]   Showing recent visits within past 7 days and meeting all other requirements  Today's Visits  Date Type Provider Dept   09/24/21 Telemedicine Vitaliy Bailey, [facility]   Showing today's visits and meeting all other requirements  Future Appointments  No visits were found meeting these conditions  Showing future appointments within next 150 days and meeting all other requirements     This virtual check-in was done via [employer] and patient was informed that this is a secure, HIPAA-compliant platform  She agrees to proceed  Patient agrees to participate in a virtual check in via telephone or video visit instead of presenting to the office to address urgent/immediate medical needs  Patient is aware this is a billable service      After connecting through [facility], the patient was identified by name and date of birth  Nelia Cummings was informed that this was a telemedicine visit and that the exam was being conducted confidentially over secure lines  My office door was closed  No one else was in the room  Nelia Cummings acknowledged consent and understanding of privacy and security of the telemedicine visit  I informed the patient that I have reviewed her record in Epic and presented the opportunity for her to ask any questions regarding the visit today  The patient agreed to participate  Subjective:   Nelia Cummings is a 31 y o  female who has been screened for COVID-19  Symptom change since last report: improving  Patient's symptoms include fever, fatigue, nasal congestion and rhinorrhea  Patient denies chills, malaise, sore throat, anosmia, loss of taste, cough, shortness of breath, chest tightness, abdominal pain, nausea, vomiting, diarrhea, myalgias and headaches  Date of symptom onset: 9/19/2021  Date of positive COVID-19 PCR: 9/22/2021  COVID-19 vaccination status: Not vaccinated    Donna Duggan has been staying home and has isolated themselves in her home  She is taking care to not share personal items and is cleaning all surfaces that are touched often, like counters, tabletops, and doorknobs using household cleaning sprays or wipes  She is wearing a mask when she leaves her room       Lab Results   Component Value Date    SARSCOV2 Positive (A) 09/22/2021    SARSCOV2 Not Detected 12/15/2020     Past Medical History:   Diagnosis Date    Wears glasses      Past Surgical History:   Procedure Laterality Date    FRACTURE SURGERY Right 2004    hip    HIP PINNING Right     Right hip pinning for fracture of growth plate at age 7     Current Outpatient Medications   Medication Sig Dispense Refill    albuterol (Ventolin HFA) 90 mcg/act inhaler Inhale 2 puffs every 4 (four) hours as needed for wheezing or shortness of breath 18 g 6    hydrocortisone 2 5 % cream Apply topically 2 (two) times a day 30 g 0    Loratadine (ALAVERT PO) Take 10 mg by mouth daily at bedtime       mometasone-formoterol (Dulera) 100-5 MCG/ACT inhaler Inhale 2 puffs 2 (two) times a day Rinse mouth after use  13 g 2    mupirocin (BACTROBAN) 2 % ointment Apply topically 3 (three) times a day 22 g 0     No current facility-administered medications for this visit  Allergies   Allergen Reactions    Penicillins Hives    Toradol [Ketorolac Tromethamine] Throat Swelling    Latex Rash    Singulair [Montelukast] Headache       Review of Systems   Constitutional: Positive for fatigue and fever  Negative for chills  HENT: Positive for congestion, nosebleeds and rhinorrhea  Negative for sore throat  Respiratory: Negative for cough, chest tightness and shortness of breath  Gastrointestinal: Negative for abdominal pain, diarrhea, nausea and vomiting  Musculoskeletal: Negative for myalgias  Neurological: Negative for headaches  Objective:    Vitals:    09/24/21 0943   Weight: 96 2 kg (212 lb)   Height: 5' 4" (1 626 m)       Physical Exam  Vitals reviewed  Constitutional:       General: She is not in acute distress  Appearance: Normal appearance  Comments: Audible congestion noted    HENT:      Head: Normocephalic and atraumatic  Neurological:      Mental Status: She is alert and oriented to person, place, and time  Psychiatric:         Mood and Affect: Mood normal          VIRTUAL VISIT [address] [place] [address] verbally agrees to participate in [employer]  Pt is aware that [employer] could be limited without vital signs or the ability to perform a full hands-on physical exam  Trina Wright understands she or the provider may request at any time to terminate the video visit and request the patient to seek care or treatment in person

## 2021-09-27 ENCOUNTER — PATIENT MESSAGE (OUTPATIENT)
Dept: FAMILY MEDICINE CLINIC | Facility: CLINIC | Age: 26
End: 2021-09-27

## 2021-09-27 DIAGNOSIS — Z11.52 ENCOUNTER FOR SCREENING FOR COVID-19: Primary | ICD-10-CM

## 2021-09-28 PROCEDURE — U0005 INFEC AGEN DETEC AMPLI PROBE: HCPCS | Performed by: NURSE PRACTITIONER

## 2021-09-28 PROCEDURE — U0003 INFECTIOUS AGENT DETECTION BY NUCLEIC ACID (DNA OR RNA); SEVERE ACUTE RESPIRATORY SYNDROME CORONAVIRUS 2 (SARS-COV-2) (CORONAVIRUS DISEASE [COVID-19]), AMPLIFIED PROBE TECHNIQUE, MAKING USE OF HIGH THROUGHPUT TECHNOLOGIES AS DESCRIBED BY CMS-2020-01-R: HCPCS | Performed by: NURSE PRACTITIONER

## 2021-10-04 ENCOUNTER — PATIENT MESSAGE (OUTPATIENT)
Dept: FAMILY MEDICINE CLINIC | Facility: CLINIC | Age: 26
End: 2021-10-04

## 2021-10-04 DIAGNOSIS — Z11.52 ENCOUNTER FOR SCREENING FOR COVID-19: Primary | ICD-10-CM

## 2021-10-05 ENCOUNTER — TELEMEDICINE (OUTPATIENT)
Dept: FAMILY MEDICINE CLINIC | Facility: CLINIC | Age: 26
End: 2021-10-05
Payer: COMMERCIAL

## 2021-10-05 VITALS — HEIGHT: 64 IN | BODY MASS INDEX: 36.19 KG/M2 | WEIGHT: 212 LBS

## 2021-10-05 DIAGNOSIS — U07.1 COVID-19: Primary | ICD-10-CM

## 2021-10-05 PROBLEM — T14.8XXA WOUND INFECTION: Status: RESOLVED | Noted: 2021-09-13 | Resolved: 2021-10-05

## 2021-10-05 PROBLEM — R05.9 COUGH: Status: RESOLVED | Noted: 2021-01-18 | Resolved: 2021-10-05

## 2021-10-05 PROBLEM — L08.9 WOUND INFECTION: Status: RESOLVED | Noted: 2021-09-13 | Resolved: 2021-10-05

## 2021-10-05 PROCEDURE — U0003 INFECTIOUS AGENT DETECTION BY NUCLEIC ACID (DNA OR RNA); SEVERE ACUTE RESPIRATORY SYNDROME CORONAVIRUS 2 (SARS-COV-2) (CORONAVIRUS DISEASE [COVID-19]), AMPLIFIED PROBE TECHNIQUE, MAKING USE OF HIGH THROUGHPUT TECHNOLOGIES AS DESCRIBED BY CMS-2020-01-R: HCPCS | Performed by: NURSE PRACTITIONER

## 2021-10-05 PROCEDURE — U0005 INFEC AGEN DETEC AMPLI PROBE: HCPCS | Performed by: NURSE PRACTITIONER

## 2021-10-05 PROCEDURE — 99213 OFFICE O/P EST LOW 20 MIN: CPT | Performed by: NURSE PRACTITIONER

## 2021-10-11 ENCOUNTER — TELEPHONE (OUTPATIENT)
Dept: FAMILY MEDICINE CLINIC | Facility: CLINIC | Age: 26
End: 2021-10-11

## 2021-10-13 ENCOUNTER — OFFICE VISIT (OUTPATIENT)
Dept: FAMILY MEDICINE CLINIC | Facility: CLINIC | Age: 26
End: 2021-10-13
Payer: COMMERCIAL

## 2021-10-13 VITALS
WEIGHT: 212 LBS | BODY MASS INDEX: 36.19 KG/M2 | DIASTOLIC BLOOD PRESSURE: 78 MMHG | HEART RATE: 83 BPM | SYSTOLIC BLOOD PRESSURE: 128 MMHG | HEIGHT: 64 IN | RESPIRATION RATE: 16 BRPM | TEMPERATURE: 98 F | OXYGEN SATURATION: 98 %

## 2021-10-13 DIAGNOSIS — G44.52 NEW DAILY PERSISTENT HEADACHE: Primary | ICD-10-CM

## 2021-10-13 DIAGNOSIS — F43.21 GRIEF REACTION: ICD-10-CM

## 2021-10-13 DIAGNOSIS — J45.991 COUGH VARIANT ASTHMA: ICD-10-CM

## 2021-10-13 PROCEDURE — 99214 OFFICE O/P EST MOD 30 MIN: CPT | Performed by: NURSE PRACTITIONER

## 2021-10-13 RX ORDER — NAPROXEN 125 MG/5ML
500 SUSPENSION ORAL 2 TIMES DAILY
Qty: 473 ML | Refills: 0 | Status: SHIPPED | OUTPATIENT
Start: 2021-10-13 | End: 2021-11-24

## 2021-10-22 ENCOUNTER — OFFICE VISIT (OUTPATIENT)
Dept: FAMILY MEDICINE CLINIC | Facility: CLINIC | Age: 26
End: 2021-10-22
Payer: COMMERCIAL

## 2021-10-22 VITALS
HEART RATE: 80 BPM | DIASTOLIC BLOOD PRESSURE: 70 MMHG | WEIGHT: 210 LBS | RESPIRATION RATE: 12 BRPM | OXYGEN SATURATION: 97 % | HEIGHT: 64 IN | SYSTOLIC BLOOD PRESSURE: 110 MMHG | TEMPERATURE: 97.5 F | BODY MASS INDEX: 35.85 KG/M2

## 2021-10-22 DIAGNOSIS — R93.5 ABNORMAL ULTRASOUND OF OVARY: ICD-10-CM

## 2021-10-22 DIAGNOSIS — G44.52 NEW DAILY PERSISTENT HEADACHE: ICD-10-CM

## 2021-10-22 DIAGNOSIS — R10.31 RLQ ABDOMINAL PAIN: Primary | ICD-10-CM

## 2021-10-22 DIAGNOSIS — R10.11 RUQ PAIN: ICD-10-CM

## 2021-10-22 DIAGNOSIS — Z23 NEED FOR INFLUENZA VACCINATION: ICD-10-CM

## 2021-10-22 LAB
SL AMB  POCT GLUCOSE, UA: NORMAL
SL AMB LEUKOCYTE ESTERASE,UA: ABNORMAL
SL AMB POCT BILIRUBIN,UA: ABNORMAL
SL AMB POCT BLOOD,UA: ABNORMAL
SL AMB POCT CLARITY,UA: CLEAR
SL AMB POCT COLOR,UA: YELLOW
SL AMB POCT KETONES,UA: ABNORMAL
SL AMB POCT NITRITE,UA: ABNORMAL
SL AMB POCT PH,UA: 7
SL AMB POCT SPECIFIC GRAVITY,UA: 1
SL AMB POCT URINE PROTEIN: ABNORMAL
SL AMB POCT UROBILINOGEN: NORMAL

## 2021-10-22 PROCEDURE — 1036F TOBACCO NON-USER: CPT | Performed by: NURSE PRACTITIONER

## 2021-10-22 PROCEDURE — 90686 IIV4 VACC NO PRSV 0.5 ML IM: CPT

## 2021-10-22 PROCEDURE — 3008F BODY MASS INDEX DOCD: CPT | Performed by: NURSE PRACTITIONER

## 2021-10-22 PROCEDURE — 81003 URINALYSIS AUTO W/O SCOPE: CPT | Performed by: NURSE PRACTITIONER

## 2021-10-22 PROCEDURE — 90471 IMMUNIZATION ADMIN: CPT

## 2021-10-22 PROCEDURE — 99214 OFFICE O/P EST MOD 30 MIN: CPT | Performed by: NURSE PRACTITIONER

## 2021-11-04 ENCOUNTER — TELEPHONE (OUTPATIENT)
Dept: FAMILY MEDICINE CLINIC | Facility: CLINIC | Age: 26
End: 2021-11-04

## 2021-11-04 DIAGNOSIS — M25.472 LEFT ANKLE SWELLING: Primary | ICD-10-CM

## 2021-11-20 ENCOUNTER — HOSPITAL ENCOUNTER (OUTPATIENT)
Dept: RADIOLOGY | Facility: HOSPITAL | Age: 26
Discharge: HOME | End: 2021-11-20
Payer: COMMERCIAL

## 2021-11-20 DIAGNOSIS — R10.31 RLQ ABDOMINAL PAIN: ICD-10-CM

## 2021-11-20 DIAGNOSIS — R10.11 RUQ PAIN: ICD-10-CM

## 2021-11-20 PROCEDURE — 76705 ECHO EXAM OF ABDOMEN: CPT

## 2021-11-20 PROCEDURE — 76830 TRANSVAGINAL US NON-OB: CPT

## 2021-11-20 PROCEDURE — 76856 US EXAM PELVIC COMPLETE: CPT

## 2021-11-24 ENCOUNTER — ANNUAL EXAM (OUTPATIENT)
Dept: OBGYN CLINIC | Facility: CLINIC | Age: 26
End: 2021-11-24
Payer: COMMERCIAL

## 2021-11-24 VITALS — WEIGHT: 211 LBS | SYSTOLIC BLOOD PRESSURE: 108 MMHG | DIASTOLIC BLOOD PRESSURE: 66 MMHG | BODY MASS INDEX: 36.22 KG/M2

## 2021-11-24 DIAGNOSIS — Z01.419 WELL WOMAN EXAM WITH ROUTINE GYNECOLOGICAL EXAM: Primary | ICD-10-CM

## 2021-11-24 PROCEDURE — 99395 PREV VISIT EST AGE 18-39: CPT | Performed by: OBSTETRICS & GYNECOLOGY

## 2021-11-24 PROCEDURE — G0145 SCR C/V CYTO,THINLAYER,RESCR: HCPCS | Performed by: OBSTETRICS & GYNECOLOGY

## 2021-11-24 PROCEDURE — 1036F TOBACCO NON-USER: CPT | Performed by: OBSTETRICS & GYNECOLOGY

## 2021-11-26 ENCOUNTER — TELEPHONE (OUTPATIENT)
Dept: FAMILY MEDICINE CLINIC | Facility: CLINIC | Age: 26
End: 2021-11-26

## 2021-11-29 ENCOUNTER — TELEPHONE (OUTPATIENT)
Dept: OBGYN CLINIC | Facility: CLINIC | Age: 26
End: 2021-11-29

## 2021-12-01 LAB
LAB AP GYN PRIMARY INTERPRETATION: NORMAL
Lab: NORMAL

## 2021-12-27 ENCOUNTER — TELEPHONE (OUTPATIENT)
Dept: GENETICS | Facility: CLINIC | Age: 26
End: 2021-12-27

## 2021-12-29 ENCOUNTER — OFFICE VISIT (OUTPATIENT)
Dept: FAMILY MEDICINE CLINIC | Facility: CLINIC | Age: 26
End: 2021-12-29
Payer: COMMERCIAL

## 2021-12-29 VITALS
TEMPERATURE: 98.5 F | OXYGEN SATURATION: 98 % | HEART RATE: 70 BPM | WEIGHT: 209 LBS | HEIGHT: 64 IN | SYSTOLIC BLOOD PRESSURE: 130 MMHG | DIASTOLIC BLOOD PRESSURE: 74 MMHG | BODY MASS INDEX: 35.68 KG/M2

## 2021-12-29 DIAGNOSIS — I49.49 ECTOPIC BEAT: ICD-10-CM

## 2021-12-29 DIAGNOSIS — R42 DIZZINESS: Primary | ICD-10-CM

## 2021-12-29 DIAGNOSIS — R51.9 ACUTE INTRACTABLE HEADACHE, UNSPECIFIED HEADACHE TYPE: ICD-10-CM

## 2021-12-29 DIAGNOSIS — F43.21 GRIEF REACTION: ICD-10-CM

## 2021-12-29 PROBLEM — E86.0 DEHYDRATION: Status: ACTIVE | Noted: 2021-12-29

## 2021-12-29 PROCEDURE — 99214 OFFICE O/P EST MOD 30 MIN: CPT | Performed by: NURSE PRACTITIONER

## 2021-12-29 PROCEDURE — 3008F BODY MASS INDEX DOCD: CPT | Performed by: NURSE PRACTITIONER

## 2021-12-29 PROCEDURE — 1036F TOBACCO NON-USER: CPT | Performed by: NURSE PRACTITIONER

## 2021-12-29 PROCEDURE — 93000 ELECTROCARDIOGRAM COMPLETE: CPT | Performed by: NURSE PRACTITIONER

## 2021-12-29 PROCEDURE — 3725F SCREEN DEPRESSION PERFORMED: CPT | Performed by: NURSE PRACTITIONER

## 2021-12-29 PROCEDURE — 36415 COLL VENOUS BLD VENIPUNCTURE: CPT | Performed by: NURSE PRACTITIONER

## 2021-12-30 LAB
BASOPHILS # BLD AUTO: 78 CELLS/UL (ref 0–200)
BASOPHILS NFR BLD AUTO: 1.6 %
BUN SERPL-MCNC: 12 MG/DL (ref 7–25)
BUN/CREAT SERPL: NORMAL (CALC) (ref 6–22)
CALCIUM SERPL-MCNC: 9.4 MG/DL (ref 8.6–10.2)
CHLORIDE SERPL-SCNC: 104 MMOL/L (ref 98–110)
CO2 SERPL-SCNC: 25 MMOL/L (ref 20–32)
CREAT SERPL-MCNC: 0.85 MG/DL (ref 0.5–1.1)
EOSINOPHIL # BLD AUTO: 147 CELLS/UL (ref 15–500)
EOSINOPHIL NFR BLD AUTO: 3 %
ERYTHROCYTE [DISTWIDTH] IN BLOOD BY AUTOMATED COUNT: 13 % (ref 11–15)
EST. AVERAGE GLUCOSE BLD GHB EST-MCNC: 105 MG/DL
EST. AVERAGE GLUCOSE BLD GHB EST-SCNC: 5.8 MMOL/L
GLUCOSE SERPL-MCNC: 91 MG/DL (ref 65–99)
HBA1C MFR BLD: 5.3 % OF TOTAL HGB
HCT VFR BLD AUTO: 40.9 % (ref 35–45)
HGB BLD-MCNC: 13.6 G/DL (ref 11.7–15.5)
IRON SATN MFR SERPL: 17 % (CALC) (ref 16–45)
IRON SERPL-MCNC: 60 MCG/DL (ref 40–190)
LYMPHOCYTES # BLD AUTO: 1896 CELLS/UL (ref 850–3900)
LYMPHOCYTES NFR BLD AUTO: 38.7 %
MCH RBC QN AUTO: 28 PG (ref 27–33)
MCHC RBC AUTO-ENTMCNC: 33.3 G/DL (ref 32–36)
MCV RBC AUTO: 84.2 FL (ref 80–100)
MONOCYTES # BLD AUTO: 505 CELLS/UL (ref 200–950)
MONOCYTES NFR BLD AUTO: 10.3 %
NEUTROPHILS # BLD AUTO: 2274 CELLS/UL (ref 1500–7800)
NEUTROPHILS NFR BLD AUTO: 46.4 %
PLATELET # BLD AUTO: 374 THOUSAND/UL (ref 140–400)
PMV BLD REES-ECKER: 9.7 FL (ref 7.5–12.5)
POTASSIUM SERPL-SCNC: 4.6 MMOL/L (ref 3.5–5.3)
RBC # BLD AUTO: 4.86 MILLION/UL (ref 3.8–5.1)
SL AMB EGFR AFRICAN AMERICAN: 109 ML/MIN/1.73M2
SL AMB EGFR NON AFRICAN AMERICAN: 94 ML/MIN/1.73M2
SODIUM SERPL-SCNC: 137 MMOL/L (ref 135–146)
TIBC SERPL-MCNC: 344 MCG/DL (CALC) (ref 250–450)
TSH SERPL-ACNC: 3.67 MIU/L
WBC # BLD AUTO: 4.9 THOUSAND/UL (ref 3.8–10.8)

## 2022-01-04 ENCOUNTER — IMMUNIZATIONS (OUTPATIENT)
Dept: FAMILY MEDICINE CLINIC | Facility: HOSPITAL | Age: 27
End: 2022-01-04

## 2022-01-04 ENCOUNTER — OFFICE VISIT (OUTPATIENT)
Dept: FAMILY MEDICINE CLINIC | Facility: CLINIC | Age: 27
End: 2022-01-04
Payer: COMMERCIAL

## 2022-01-04 VITALS
RESPIRATION RATE: 16 BRPM | BODY MASS INDEX: 36.19 KG/M2 | WEIGHT: 212 LBS | HEIGHT: 64 IN | DIASTOLIC BLOOD PRESSURE: 74 MMHG | OXYGEN SATURATION: 99 % | TEMPERATURE: 98.5 F | SYSTOLIC BLOOD PRESSURE: 116 MMHG | HEART RATE: 80 BPM

## 2022-01-04 DIAGNOSIS — R42 DIZZINESS: Primary | ICD-10-CM

## 2022-01-04 DIAGNOSIS — Z23 ENCOUNTER FOR IMMUNIZATION: Primary | ICD-10-CM

## 2022-01-04 DIAGNOSIS — G44.52 NEW DAILY PERSISTENT HEADACHE: ICD-10-CM

## 2022-01-04 PROCEDURE — 99214 OFFICE O/P EST MOD 30 MIN: CPT | Performed by: NURSE PRACTITIONER

## 2022-01-04 PROCEDURE — 0011A COVID-19 MODERNA VACC 0.5 ML: CPT

## 2022-01-04 PROCEDURE — 91301 COVID-19 MODERNA VACC 0.5 ML: CPT

## 2022-01-04 NOTE — PROGRESS NOTES
Assessment/Plan:    1  Dizziness  Assessment & Plan:  Symptoms improved with increased hydration  Reviewed labs with patient - wnl  Recommend PT evaluation with [facility], possible inner ear issue  Orders:  -     Ambulatory referral to Physical Therapy; Future    2  New daily persistent headache  Assessment & Plan:  Headache improved per pt  She continues to increase her daily fluid intake  Encourage hydration  Return if symptoms worsen or fail to improve  Subjective:      Patient ID: Tiffanie Lozoya is a 31 y o  female  Chief Complaint   Patient presents with   David Garcia is a 31year old female who returns to the office for a recheck of dizziness and review of labs  Reports her symptoms overall have improved with increasing her daily water intake  She continues to have some dizziness when tilting her head to either side  Reports her headache has greatly improved  Denies fever, chills, chest pain, n/v/d  The following portions of the patient's history were reviewed and updated as appropriate: allergies, current medications, past family history, past medical history, past social history, past surgical history and problem list     Review of Systems   Constitutional: Negative for chills, fatigue and fever  Respiratory: Negative for cough, chest tightness and shortness of breath  Cardiovascular: Negative for chest pain  Neurological: Positive for dizziness  Negative for headaches  Current Outpatient Medications   Medication Sig Dispense Refill    albuterol (Ventolin HFA) 90 mcg/act inhaler Inhale 2 puffs every 4 (four) hours as needed for wheezing or shortness of breath 18 g 6    Loratadine (ALAVERT PO) Take 10 mg by mouth daily at bedtime       mometasone-formoterol (Dulera) 100-5 MCG/ACT inhaler Inhale 2 puffs 2 (two) times a day Rinse mouth after use   13 g 2    Elastic Bandages & Supports (Medical Compression Socks) MISC Use daily (Patient not taking: Reported on 1/4/2022 ) 2 each 2     No current facility-administered medications for this visit  Objective:    /74   Pulse 80   Temp 98 5 °F (36 9 °C) (Temporal)   Resp 16   Ht 5' 4" (1 626 m)   Wt 96 2 kg (212 lb)   SpO2 99%   BMI 36 39 kg/m²        Physical Exam  Vitals reviewed  Constitutional:       General: She is not in acute distress  Appearance: She is well-developed  She is not diaphoretic  HENT:      Head: Normocephalic and atraumatic  Eyes:      General:         Right eye: No discharge  Left eye: No discharge  Conjunctiva/sclera: Conjunctivae normal    Neck:      Thyroid: No thyromegaly  Cardiovascular:      Rate and Rhythm: Normal rate and regular rhythm  Heart sounds: Normal heart sounds  Pulmonary:      Effort: Pulmonary effort is normal  No respiratory distress  Breath sounds: Normal breath sounds  No decreased breath sounds, wheezing, rhonchi or rales  Lymphadenopathy:      Cervical: No cervical adenopathy  Skin:     General: Skin is warm and dry  Findings: No rash  Neurological:      Mental Status: She is alert and oriented to person, place, and time  Psychiatric:         Behavior: Behavior normal          Thought Content:  Thought content normal          Judgment: Judgment normal                 ALYSSA Martínez

## 2022-01-04 NOTE — ASSESSMENT & PLAN NOTE
Symptoms improved with increased hydration  Reviewed labs with patient - wnl  Recommend PT evaluation with [facility], possible inner ear issue

## 2022-01-17 ENCOUNTER — HOSPITAL ENCOUNTER (EMERGENCY)
Facility: HOSPITAL | Age: 27
Discharge: HOME | End: 2022-01-17
Attending: EMERGENCY MEDICINE
Payer: COMMERCIAL

## 2022-01-17 ENCOUNTER — APPOINTMENT (OUTPATIENT)
Dept: RADIOLOGY | Facility: CLINIC | Age: 27
End: 2022-01-17
Payer: COMMERCIAL

## 2022-01-17 ENCOUNTER — OFFICE VISIT (OUTPATIENT)
Dept: URGENT CARE | Facility: CLINIC | Age: 27
End: 2022-01-17
Payer: COMMERCIAL

## 2022-01-17 VITALS
SYSTOLIC BLOOD PRESSURE: 118 MMHG | OXYGEN SATURATION: 99 % | DIASTOLIC BLOOD PRESSURE: 69 MMHG | RESPIRATION RATE: 18 BRPM | HEART RATE: 156 BPM | BODY MASS INDEX: 33.49 KG/M2 | HEIGHT: 66 IN | WEIGHT: 208.4 LBS | TEMPERATURE: 102.6 F

## 2022-01-17 VITALS
RESPIRATION RATE: 20 BRPM | OXYGEN SATURATION: 98 % | SYSTOLIC BLOOD PRESSURE: 167 MMHG | DIASTOLIC BLOOD PRESSURE: 89 MMHG | TEMPERATURE: 102 F | HEART RATE: 140 BPM

## 2022-01-17 DIAGNOSIS — R50.9 FEVER: Primary | ICD-10-CM

## 2022-01-17 DIAGNOSIS — M54.9 BACK PAIN: ICD-10-CM

## 2022-01-17 DIAGNOSIS — M54.6 ACUTE LEFT-SIDED THORACIC BACK PAIN: Primary | ICD-10-CM

## 2022-01-17 DIAGNOSIS — R05.9 COUGH: ICD-10-CM

## 2022-01-17 DIAGNOSIS — N39.0 URINARY TRACT INFECTION WITHOUT HEMATURIA, SITE UNSPECIFIED: ICD-10-CM

## 2022-01-17 DIAGNOSIS — M54.6 ACUTE LEFT-SIDED THORACIC BACK PAIN: ICD-10-CM

## 2022-01-17 LAB
SL AMB  POCT GLUCOSE, UA: ABNORMAL
SL AMB LEUKOCYTE ESTERASE,UA: ABNORMAL
SL AMB POCT BILIRUBIN,UA: ABNORMAL
SL AMB POCT BLOOD,UA: ABNORMAL
SL AMB POCT CLARITY,UA: ABNORMAL
SL AMB POCT COLOR,UA: ABNORMAL
SL AMB POCT KETONES,UA: ABNORMAL
SL AMB POCT NITRITE,UA: ABNORMAL
SL AMB POCT PH,UA: 7
SL AMB POCT SPECIFIC GRAVITY,UA: 1.01
SL AMB POCT URINE PROTEIN: ABNORMAL
SL AMB POCT UROBILINOGEN: 0.2

## 2022-01-17 PROCEDURE — 81002 URINALYSIS NONAUTO W/O SCOPE: CPT | Performed by: PHYSICIAN ASSISTANT

## 2022-01-17 PROCEDURE — 99284 EMERGENCY DEPT VISIT MOD MDM: CPT | Performed by: EMERGENCY MEDICINE

## 2022-01-17 PROCEDURE — 71046 X-RAY EXAM CHEST 2 VIEWS: CPT

## 2022-01-17 PROCEDURE — 87636 SARSCOV2 & INF A&B AMP PRB: CPT | Performed by: EMERGENCY MEDICINE

## 2022-01-17 PROCEDURE — 99283 EMERGENCY DEPT VISIT LOW MDM: CPT

## 2022-01-17 PROCEDURE — 99214 OFFICE O/P EST MOD 30 MIN: CPT | Performed by: PHYSICIAN ASSISTANT

## 2022-01-17 RX ORDER — IBUPROFEN 600 MG/1
600 TABLET ORAL ONCE
Status: DISCONTINUED | OUTPATIENT
Start: 2022-01-17 | End: 2022-01-17 | Stop reason: HOSPADM

## 2022-01-17 RX ORDER — ACETAMINOPHEN 160 MG/5ML
650 SUSPENSION, ORAL (FINAL DOSE FORM) ORAL ONCE
Status: DISCONTINUED | OUTPATIENT
Start: 2022-01-17 | End: 2022-01-17 | Stop reason: HOSPADM

## 2022-01-17 RX ORDER — BROMPHENIRAMINE MALEATE, PSEUDOEPHEDRINE HYDROCHLORIDE, AND DEXTROMETHORPHAN HYDROBROMIDE 2; 30; 10 MG/5ML; MG/5ML; MG/5ML
5 SYRUP ORAL 4 TIMES DAILY PRN
Qty: 120 ML | Refills: 0 | Status: SHIPPED | OUTPATIENT
Start: 2022-01-17 | End: 2022-01-17

## 2022-01-17 RX ORDER — NITROFURANTOIN 25; 75 MG/1; MG/1
100 CAPSULE ORAL 2 TIMES DAILY
Qty: 10 CAPSULE | Refills: 0 | Status: SHIPPED | OUTPATIENT
Start: 2022-01-17 | End: 2022-01-22

## 2022-01-17 NOTE — PROGRESS NOTES
[address]        NAME: Tito Phan is a 31 y o  female  : 1995    MRN: [id number]  DATE: 2022  TIME: 8:16 AM    Assessment and Plan   Acute left-sided thoracic back pain [M54 6]  1  Acute left-sided thoracic back pain  POCT urine dip    Urine culture    XR chest pa & lateral    Transfer to other facility   2  Urinary tract infection without hematuria, site unspecified  nitrofurantoin (MACROBID) 100 mg capsule   3  Cough  DISCONTINUED: brompheniramine-pseudoephedrine-DM 30-2-10 MG/5ML syrup     Pt noted to have a slight UTI on urine dip however, I do not believe this is the cause of the left upper back pain as there is no CVA tenderness  CXR showed large clonic distension and tracheal deviation which may be causing referred pain to the back  I sent the pt to the ER for further work up  The pt is tachy at 156 and has a temp of 102 6 in the office  Pt is aware of XR findings and is on her way to [facility]  Edmundo Mahmood was called and I spoke with a nurse in the [address] was rx for UTI which I believe is an incidental finding   19:09 the pt arrived at the ED         Patient Instructions   Patient Instructions   I recommend work up in the ER since your colon appears enlarged from gas     Your urine dip came back positive  I will send for culture and follow up if the antibiotic needs to be changed  I recommend Pyridium over the counter for discomfort  You can take it for 2 days  I recommend you drink plenty of fluids  Monitor for any worsening symptoms  If you develop worse abdominal pain, back pain, fever/chills, inability to drink liquids or have a decrease in the amount of urine you make go to the Emergency room  Bromfed for cough at night as it may make you drowsy   Xray does not show a pneumonia or COVID  Will follow up with radiologist report when available  If not improving over the next week, follow up with PCP or orthopedics              Follow up with PCP in 3-5 days   Proceed to  ER if symptoms worsen  Chief Complaint     Chief Complaint   Patient presents with    Back Pain     Patient stated that she was having upper back pain on the left side and is radiating through to her chest she is also having body aches and fever  She tried tylenol with no relief  Today she took advil it help with the pain  History of Present Illness       The pt is a 50-year-old female presenting with left sided upper back pain/shoulder pain x 2-3 days  No prior episodes  The pain is around the L shoulder area but she feels it deep inside and it and goes through her chest  She called  who told her to take tylenol and Advil but that did not help  Today she felt feverish and had chills while at work prompting her to come in  Today in the office her temp is 102  6  HR is 156  Pt also reports rhinorrhea and a slight cough  Pt has a BM regularly and denies any blood in the stool  She denies dysuria, frequency, and urgency  However, on urine dip she does have trace blood, small leuks and trace protein  Review of Systems   Review of Systems   Constitutional: Positive for fever  Negative for activity change, appetite change, chills and fatigue  HENT: Positive for rhinorrhea  Negative for congestion, sinus pressure, sinus pain and sore throat  Respiratory: Positive for cough  Negative for chest tightness and shortness of breath  Cardiovascular: Negative for chest pain and palpitations  Gastrointestinal: Negative for diarrhea, nausea and vomiting  Musculoskeletal: Positive for myalgias (left upper back pain)  Negative for arthralgias  Skin: Negative for color change and pallor  Neurological: Negative for headaches           Current Medications       Current Outpatient Medications:     albuterol (Ventolin HFA) 90 mcg/act inhaler, Inhale 2 puffs every 4 (four) hours as needed for wheezing or shortness of breath, Disp: 18 g, Rfl: 6    Loratadine (ALAVERT PO), Take 10 mg by mouth daily at bedtime , Disp: , Rfl:     mometasone-formoterol (Dulera) 100-5 MCG/ACT inhaler, Inhale 2 puffs 2 (two) times a day Rinse mouth after use , Disp: 13 g, Rfl: 2    Elastic Bandages & Supports (Medical Compression Socks) MISC, Use daily (Patient not taking: Reported on 1/4/2022 ), Disp: 2 each, Rfl: 2    nitrofurantoin (MACROBID) 100 mg capsule, Take 1 capsule (100 mg total) by mouth 2 (two) times a day for 5 days, Disp: 10 capsule, Rfl: 0  No current facility-administered medications for this visit  Current Allergies     Allergies as of 01/17/2022 - Reviewed 01/17/2022   Allergen Reaction Noted    Penicillins Hives 01/09/2019    Toradol [ketorolac tromethamine] Throat Swelling 01/09/2019    Latex Rash 01/09/2019    Singulair [montelukast] Headache 01/18/2021            The following portions of the patient's history were reviewed and updated as appropriate: allergies, current medications, past family history, past medical history, past social history, past surgical history and problem list      Past Medical History:   Diagnosis Date    Asthma 2021    Wears glasses        Past Surgical History:   Procedure Laterality Date    FRACTURE SURGERY Right 2004    hip    HIP PINNING Right     Right hip pinning for fracture of growth plate at age 7       Family History   Problem Relation Age of Onset    Cancer Mother         Passed away 6-12-21    Cervical cancer Mother     Diabetes Father     Asthma Sister     No Known Problems Sister     Ovarian cancer Maternal Aunt          Medications have been verified  Objective   /69 (BP Location: Left arm, Patient Position: Sitting, Cuff Size: Standard)   Pulse (!) 156   Temp (!) 102 6 °F (39 2 °C) (Tympanic)   Resp 18   Ht 5' 6" (1 676 m)   Wt 94 5 kg (208 lb 6 4 oz)   LMP 01/12/2022   SpO2 99%   BMI 33 64 kg/m²        Physical Exam     Physical Exam  Vitals reviewed     Constitutional:       General: She is not in acute distress  Appearance: Normal appearance  She is normal weight  She is not ill-appearing, toxic-appearing or diaphoretic  Comments: Sitting comfortably but appears anxious      HENT:      Head: Normocephalic and atraumatic  Right Ear: Tympanic membrane, ear canal and external ear normal  There is no impacted cerumen  Left Ear: Tympanic membrane, ear canal and external ear normal  There is no impacted cerumen  Nose: Nose normal  No congestion or rhinorrhea  Mouth/Throat:      Mouth: Mucous membranes are moist       Pharynx: Oropharynx is clear  No oropharyngeal exudate or posterior oropharyngeal erythema  Eyes:      General: No scleral icterus  Right eye: No discharge  Left eye: No discharge  Extraocular Movements: Extraocular movements intact  Conjunctiva/sclera: Conjunctivae normal       Pupils: Pupils are equal, round, and reactive to light  Cardiovascular:      Rate and Rhythm: Normal rate and regular rhythm  Heart sounds: Normal heart sounds  No murmur heard  No friction rub  No gallop  Pulmonary:      Effort: Pulmonary effort is normal  No respiratory distress  Breath sounds: Normal breath sounds  No stridor  No wheezing, rhonchi or rales  Chest:      Chest wall: No tenderness  Abdominal:      General: Abdomen is flat  Bowel sounds are normal  There is no distension  Palpations: Abdomen is soft  There is no mass  Tenderness: There is no abdominal tenderness  There is no right CVA tenderness, left CVA tenderness, guarding or rebound  Hernia: No hernia is present  Musculoskeletal:         General: Tenderness (left shoulder slight TTP but pt reports it being deeper ) present  No swelling  Normal range of motion  Skin:     General: Skin is warm and dry  Capillary Refill: Capillary refill takes less than 2 seconds  Neurological:      Mental Status: She is alert

## 2022-01-17 NOTE — PATIENT INSTRUCTIONS
I recommend work up in the ER since your colon appears enlarged from gas     Your urine dip came back positive  I will send for culture and follow up if the antibiotic needs to be changed  I recommend Pyridium over the counter for discomfort  You can take it for 2 days  I recommend you drink plenty of fluids  Monitor for any worsening symptoms  If you develop worse abdominal pain, back pain, fever/chills, inability to drink liquids or have a decrease in the amount of urine you make go to the Emergency room  Bromfed for cough at night as it may make you drowsy   Xray does not show a pneumonia or COVID  Will follow up with radiologist report when available  If not improving over the next week, follow up with PCP or orthopedics

## 2022-01-18 ENCOUNTER — TELEPHONE (OUTPATIENT)
Dept: URGENT CARE | Facility: CLINIC | Age: 27
End: 2022-01-18

## 2022-01-18 DIAGNOSIS — M54.6 ACUTE LEFT-SIDED THORACIC BACK PAIN: Primary | ICD-10-CM

## 2022-01-18 LAB
FLUAV RNA RESP QL NAA+PROBE: NEGATIVE
FLUBV RNA RESP QL NAA+PROBE: NEGATIVE
SARS-COV-2 RNA RESP QL NAA+PROBE: NEGATIVE

## 2022-01-18 RX ORDER — METHOCARBAMOL 500 MG/1
500 TABLET, FILM COATED ORAL
Qty: 7 TABLET | Refills: 0 | Status: SHIPPED | OUTPATIENT
Start: 2022-01-18 | End: 2022-01-24

## 2022-01-18 RX ORDER — LIDOCAINE 50 MG/G
1 PATCH TOPICAL DAILY
Qty: 30 PATCH | Refills: 0 | Status: SHIPPED | OUTPATIENT
Start: 2022-01-18 | End: 2022-01-24

## 2022-01-18 NOTE — TELEPHONE ENCOUNTER
I called the pt at 6:20 pm to see how she was feeling  She reported that in the ER the Dr told her that the gas distension seen on her XRAY was normal and that she did not need a workup  The ER doctor told her to take Gas-X so she will try that today  The pt reports the same exact level of pain today from when I saw her  She is using Ibuprofen which dulls the pain for an hour or so  She is also using a heating pad which makes the pain bearable  The pt takes Ibuprofen every 6 hours  When it wears off at night around 3 am the pain is so severe it wakes her up from sleep  She reports using the heating pad all night to be able to sleep  She also reports being unable to leave her house from the pain  I explained that this does not sound like just a muscle strain  I reccommended that if the pain is not improving or worsening that she should go back to the ER  I explained red flag symptoms such as fevers, increased heart rate, nausea, vomiting or not able to have a bowel movement or pas gas  She again described the pain as a sharp stabbing pain radiating from right above where the colon was on her XRAY through to her chest  I explained that the pain may be referred from the gas in her colon  I will also call in a  Muscle relaxer to see if that alleviated any pain  The pt is aware if this does not help it makes muscle strain less likley  She will also try lidocaine patches  She will call her PCP tomorrow to have a follow up  I also diagnosed her with a UTI based on urine dip during the visit but she did not start the antibiotic yet  I explained that a UTI may cause pain when urinating/ bladder pain or low back pain if the infection goes up to her kidneys  I again explained that I do not think the UTI is the cause of her pain as she did not have CVA tenderness or urinary symptoms  However, we will treat  All questions answered at this time and pt aware to call back if anything changes   Also to note PT was swabbed for COVID/FLU in ED which came back negative

## 2022-01-18 NOTE — ED NOTES
States told at urgent care she had a uti, noted macrobid rx under meds  Denies being told they were giving her that unaware of it   Just told to go to ed     Ross Guzman RN  01/17/22 1936

## 2022-01-18 NOTE — ED PROVIDER NOTES
History  Chief Complaint   Patient presents with    Back Pain     c/o L scapular pain for several days  just came from urgent care had xray , came in very anxious that they did not explain xray very well and she did not understand their explanation, no respiratory difficulty    Fever - 9 weeks to 74 years     fever noted at urgent care and again here     Patient presents for evaluation of left upper back pain  States she has had the pain for several days  Ibuprofen makes the pain better but then wears off comes back  Pain is worse with coughing and movement  Denies any known injury trauma or fall  Patient went to urgent care today for evaluation they did chest x-ray and shoulder that her intestines were around her heart and that she had to come to the ER for emergent evaluation  Patient is very anxious and upset stating that they did not explain it very well and she is worried something is wrong now  Patient does not have any abdominal pain  No shortness of breath  She does have a cough  She was unaware that she had a fever  Patient had COVID in October  Last vaccination dose was January 5th  States she has not felt quite right since then  Patient denies any urinary complaints  History provided by:  Patient   used: No    Back Pain  Associated symptoms: fever    Associated symptoms: no abdominal pain, no chest pain and no dysuria    Fever - 9 weeks to 74 years  Associated symptoms: no chest pain, no chills, no cough, no dysuria, no ear pain, no rash, no sore throat and no vomiting        Prior to Admission Medications   Prescriptions Last Dose Informant Patient Reported? Taking?    Elastic Bandages & Supports (Medical Compression Socks) MISC  Self No No   Sig: Use daily   Patient not taking: Reported on 1/4/2022    Loratadine (ALAVERT PO)  Self Yes No   Sig: Take 10 mg by mouth daily at bedtime    albuterol (Ventolin HFA) 90 mcg/act inhaler  Self No No   Sig: Inhale 2 puffs every 4 (four) hours as needed for wheezing or shortness of breath   mometasone-formoterol (Dulera) 100-5 MCG/ACT inhaler  Self No No   Sig: Inhale 2 puffs 2 (two) times a day Rinse mouth after use  nitrofurantoin (MACROBID) 100 mg capsule   No No   Sig: Take 1 capsule (100 mg total) by mouth 2 (two) times a day for 5 days      Facility-Administered Medications: None       Past Medical History:   Diagnosis Date    Asthma 2021    Wears glasses        Past Surgical History:   Procedure Laterality Date    FRACTURE SURGERY Right 2004    hip    HIP PINNING Right     Right hip pinning for fracture of growth plate at age 7       Family History   Problem Relation Age of Onset    Cancer Mother         Passed away 6-12-21    Cervical cancer Mother     Diabetes Father     Asthma Sister     No Known Problems Sister     Ovarian cancer Maternal Aunt      I have reviewed and agree with the history as documented  E-Cigarette/Vaping    E-Cigarette Use Never User      E-Cigarette/Vaping Substances    Nicotine No     THC No     CBD No     Flavoring No     Other No     Unknown No      Social History     Tobacco Use    Smoking status: Never Smoker    Smokeless tobacco: Never Used   Vaping Use    Vaping Use: Never used   Substance Use Topics    Alcohol use: No    Drug use: No       Review of Systems   Constitutional: Positive for fever  Negative for chills  HENT: Negative for ear pain and sore throat  Eyes: Negative for pain and visual disturbance  Respiratory: Negative for cough and shortness of breath  Cardiovascular: Negative for chest pain and palpitations  Gastrointestinal: Negative for abdominal pain and vomiting  Genitourinary: Negative for dysuria, frequency, hematuria and urgency  Musculoskeletal: Positive for back pain  Negative for arthralgias  Skin: Negative for color change and rash  Neurological: Negative for seizures and syncope     All other systems reviewed and are negative  Physical Exam  Physical Exam  Vitals and nursing note reviewed  Constitutional:       General: She is not in acute distress  Appearance: Normal appearance  HENT:      Head: Atraumatic  Right Ear: External ear normal       Left Ear: External ear normal       Nose: Nose normal       Mouth/Throat:      Mouth: Mucous membranes are moist       Pharynx: Oropharynx is clear  Eyes:      General: No scleral icterus  Conjunctiva/sclera: Conjunctivae normal    Cardiovascular:      Rate and Rhythm: Normal rate and regular rhythm  Pulses: Normal pulses  Pulmonary:      Effort: Pulmonary effort is normal  No respiratory distress  Abdominal:      General: Abdomen is flat  Bowel sounds are normal  There is no distension  Palpations: Abdomen is soft  Tenderness: There is no abdominal tenderness  There is no guarding or rebound  Musculoskeletal:         General: Tenderness present  No deformity  Normal range of motion  Arms:    Skin:     Capillary Refill: Capillary refill takes less than 2 seconds  Findings: No rash  Neurological:      General: No focal deficit present  Mental Status: She is alert and oriented to person, place, and time           Vital Signs  ED Triage Vitals [01/17/22 1924]   Temperature Pulse Respirations Blood Pressure SpO2   (!) 102 °F (38 9 °C) (!) 140 20 167/89 98 %      Temp Source Heart Rate Source Patient Position - Orthostatic VS BP Location FiO2 (%)   Tympanic Monitor Sitting Right arm --      Pain Score       6           Vitals:    01/17/22 1924   BP: 167/89   Pulse: (!) 140   Patient Position - Orthostatic VS: Sitting         Visual Acuity      ED Medications  Medications   acetaminophen (TYLENOL) oral suspension 650 mg (650 mg Oral Not Given 1/17/22 2050)   ibuprofen (MOTRIN) tablet 600 mg (600 mg Oral Not Given 1/17/22 2049)       Diagnostic Studies  Results Reviewed     Procedure Component Value Units Date/Time    COVID/FLU - 24 hour TAT [[id number]] Collected: 01/17/22 2050    Lab Status: In process Specimen: Nares from Nasopharyngeal Swab Updated: 01/17/22 2055                 No orders to display              Procedures  Procedures         ED Course                                             MDM  Number of Diagnoses or Management Options  Back pain  Fever  Diagnosis management comments: Pulse ox 98% on room air indicating adequate oxygenation  Went over patient's chest x-ray in detail with her  Explained the patient that the x-ray was normal images some colonic distention under left hemidiaphragm  Radiology also read x-ray as normal   Patient likely has musculoskeletal back pain and acute viral illness  Continue take Tylenol and Motrin for the pain  COVID and flu swab sent  Amount and/or Complexity of Data Reviewed  Decide to obtain previous medical records or to obtain history from someone other than the patient: yes  Review and summarize past medical records: yes    Patient Progress  Patient progress: stable      Disposition  Final diagnoses:   Fever   Back pain     Time reflects when diagnosis was documented in both MDM as applicable and the Disposition within this note     Time User Action Codes Description Comment    1/17/2022  8:22 PM Mandy Job E Add [R50 9] Fever     1/17/2022  8:22 PM Patidaily Webb Add [M54 9] Back pain       ED Disposition     ED Disposition Condition Date/Time Comment    Discharge Stable Mon Jan 17, 2022  8:22 PM Marlyne Favor discharge to home/self care              Follow-up Information     Follow up With Specialties Details Why Contact Info Jonathan [address]las [address], [address] Nurse Practitioner In 1 week  [address] [facility] [address] [address] [address] Emergency Department Emergency Medicine  If symptoms worsen [address] [facility] [address] [address] [address] Emergency Department, [facility] [address], [place], [place], [address]          Discharge Medication List as of 1/17/2022  8:23 PM      CONTINUE these medications which have NOT CHANGED    Details   albuterol (Ventolin HFA) 90 mcg/act inhaler Inhale 2 puffs every 4 (four) hours as needed for wheezing or shortness of breath, Starting Fri 2/26/2021, Normal      Elastic Bandages & Supports (Medical Compression Socks) MISC Use daily, Starting Thu 11/4/2021, Normal      Loratadine (ALAVERT PO) Take 10 mg by mouth daily at bedtime , Historical Med      mometasone-formoterol (Dulera) 100-5 MCG/ACT inhaler Inhale 2 puffs 2 (two) times a day Rinse mouth after use , Starting Mon 9/13/2021, Normal      nitrofurantoin (MACROBID) 100 mg capsule Take 1 capsule (100 mg total) by mouth 2 (two) times a day for 5 days, Starting Mon 1/17/2022, Until Sat 1/22/2022, Normal             No discharge procedures on file      PDMP Review     None          ED Provider  Electronically Signed by           Gilson Montoya DO  01/17/22 3833

## 2022-01-19 ENCOUNTER — TELEMEDICINE (OUTPATIENT)
Dept: FAMILY MEDICINE CLINIC | Facility: CLINIC | Age: 27
End: 2022-01-19
Payer: COMMERCIAL

## 2022-01-19 VITALS — WEIGHT: 208 LBS | BODY MASS INDEX: 33.43 KG/M2 | HEIGHT: 66 IN

## 2022-01-19 DIAGNOSIS — R06.00 DYSPNEA ON EXERTION: ICD-10-CM

## 2022-01-19 DIAGNOSIS — M54.6 ACUTE LEFT-SIDED THORACIC BACK PAIN: Primary | ICD-10-CM

## 2022-01-19 DIAGNOSIS — N30.01 ACUTE CYSTITIS WITH HEMATURIA: ICD-10-CM

## 2022-01-19 DIAGNOSIS — R93.5 ABNORMAL ULTRASOUND OF OVARY: ICD-10-CM

## 2022-01-19 DIAGNOSIS — R50.9 FEVER, UNSPECIFIED FEVER CAUSE: ICD-10-CM

## 2022-01-19 PROCEDURE — 99214 OFFICE O/P EST MOD 30 MIN: CPT | Performed by: NURSE PRACTITIONER

## 2022-01-19 PROCEDURE — 3008F BODY MASS INDEX DOCD: CPT | Performed by: NURSE PRACTITIONER

## 2022-01-19 RX ORDER — CIPROFLOXACIN 500 MG/1
500 TABLET, FILM COATED ORAL EVERY 12 HOURS SCHEDULED
Qty: 14 TABLET | Refills: 0 | Status: SHIPPED | OUTPATIENT
Start: 2022-01-19 | End: 2022-01-26

## 2022-01-19 NOTE — PROGRESS NOTES
Virtual Regular Visit    Verification of patient location:    Patient is located in the following state in which I hold an active license [place]      Assessment/Plan:    Problem List Items Addressed This Visit        Other    Abnormal ultrasound of ovary     Encourage pt complete follow up as previously directed  Other Visit Diagnoses     Acute left-sided thoracic back pain    -  Primary    Suspect gas pain  Encourage her to continue supportive measures with tums as this has been effective for management  Recheck 2 days  Acute cystitis with hematuria        Relevant Medications    ciprofloxacin (CIPRO) 500 mg tablet    Fever, unspecified fever cause        Relevant Medications    ciprofloxacin (CIPRO) 500 mg tablet    Dyspnea on exertion            If symptoms persist or worsen, consider further imaging with CT r/t abnormal CXR  Reason for visit is   Chief Complaint   Patient presents with   [address]    Virtual Regular Visit        Encounter provider Blessing Zambrano, [facility]    Provider located at [address] [place] [phone]      Recent Visits  Date Type Provider Dept   01/19/22 [address] [place] [address], [facility] [employer] [facility]   Showing recent visits within past 7 days and meeting all other requirements  Future Appointments  No visits were found meeting these conditions  Showing future appointments within next 150 days and meeting all other requirements       The patient was identified by name and date of birth  Zaida Serra was informed that this is a telemedicine visit and that the visit is being conducted through [address] [place] [address] Now and patient was informed that this is a secure, HIPAA-compliant platform  She agrees to proceed     My office door was closed  No one else was in the room    She acknowledged consent and understanding of privacy and security of the video platform  The patient has agreed to participate and understands they can discontinue the visit at any time  Patient is aware this is a billable service  Anais Alas is a 31year old female who is scheduled for a virtual visit to discuss her recent ER visit for left sided upper back pain  Pt reports her symptoms began earlier in the week and were accompanied with periodic diarrhea  Reports her pain began to become worse and she was seen at an urgent care facility  She was instructed to go to the ED for further evaluation due to abnormal CXR  Pt was evaluated in the hospital and instructed that she was suffering from gas pain  She does states that she has had excessive gas and burping  She did have a bowel movement this morning which she described as normal  Pt reports she has been taking mucinex and tums gas releif and reports her upper back pain is greatly improved  She was incidentally diagnosed with UTI and reports she has not started taking the antibiotic as of yet  She denies any fever currently, but was noted to have a temp of 102  Past Medical History:   Diagnosis Date    Asthma 2021    Wears glasses        Past Surgical History:   Procedure Laterality Date    FRACTURE SURGERY Right 2004    hip    HIP PINNING Right     Right hip pinning for fracture of growth plate at age 7       Current Outpatient Medications   Medication Sig Dispense Refill    albuterol (Ventolin HFA) 90 mcg/act inhaler Inhale 2 puffs every 4 (four) hours as needed for wheezing or shortness of breath 18 g 6    Loratadine (ALAVERT PO) Take 10 mg by mouth daily at bedtime       mometasone-formoterol (Dulera) 100-5 MCG/ACT inhaler Inhale 2 puffs 2 (two) times a day Rinse mouth after use   13 g 2    ciprofloxacin (CIPRO) 500 mg tablet Take 1 tablet (500 mg total) by mouth every 12 (twelve) hours for 7 days 14 tablet 0    Elastic Bandages & Supports (Medical Compression Socks) MISC Use daily (Patient not taking: Reported on 1/4/2022 ) 2 each 2    lidocaine (Lidoderm) 5 % Apply 1 patch topically daily Remove & Discard patch within 12 hours or as directed by MD (Patient not taking: Reported on 1/19/2022 ) 30 patch 0    methocarbamol (ROBAXIN) 500 mg tablet Take 1 tablet (500 mg total) by mouth daily at bedtime for 7 days (Patient not taking: Reported on 1/19/2022 ) 7 tablet 0    nitrofurantoin (MACROBID) 100 mg capsule Take 1 capsule (100 mg total) by mouth 2 (two) times a day for 5 days (Patient not taking: Reported on 1/19/2022 ) 10 capsule 0     No current facility-administered medications for this visit  Allergies   Allergen Reactions    Penicillins Hives    Toradol [Ketorolac Tromethamine] Throat Swelling    Latex Rash    Singulair [Montelukast] Headache       Review of Systems   Constitutional: Negative for chills, diaphoresis, fatigue and fever  HENT: Negative for congestion, ear discharge, ear pain, postnasal drip, rhinorrhea, sinus pressure, sinus pain and sore throat  Eyes: Negative for pain and discharge  Respiratory: Negative for cough, chest tightness, shortness of breath and wheezing  Cardiovascular: Negative for chest pain  Gastrointestinal: Negative for diarrhea, nausea and vomiting  Genitourinary: Negative for dysuria  Musculoskeletal: Positive for back pain  Negative for myalgias  Skin: Negative for rash  Neurological: Negative for dizziness and headaches  Hematological: Negative for adenopathy  Video Exam    Vitals:    01/19/22 1059   Weight: 94 3 kg (208 lb)   Height: 5' 6" (1 676 m)       Physical Exam  Vitals reviewed  Constitutional:       Appearance: Normal appearance  HENT:      Head: Normocephalic and atraumatic  Musculoskeletal:        Arms:       Comments: Pt indicates back pain left upper back right below scapular border   Neurological:      Mental Status: She is alert and oriented to person, place, and time     Psychiatric:         Mood and Affect: Mood normal           I spent 15 minutes directly with the patient during this visit    VIRTUAL VISIT [facility] verbally agrees to participate in [employer]  Pt is aware that [employer] could be limited without vital signs or the ability to perform a full hands-on physical exam  Trina Conroy understands she or the provider may request at any time to terminate the video visit and request the patient to seek care or treatment in person

## 2022-01-20 ENCOUNTER — PATIENT MESSAGE (OUTPATIENT)
Dept: FAMILY MEDICINE CLINIC | Facility: CLINIC | Age: 27
End: 2022-01-20

## 2022-01-20 DIAGNOSIS — N30.01 ACUTE CYSTITIS WITH HEMATURIA: Primary | ICD-10-CM

## 2022-01-20 RX ORDER — CIPROFLOXACIN 500 MG/5ML
500 KIT ORAL 2 TIMES DAILY
Qty: 50 ML | Refills: 0 | Status: SHIPPED | OUTPATIENT
Start: 2022-01-20 | End: 2022-01-24

## 2022-01-24 ENCOUNTER — TELEMEDICINE (OUTPATIENT)
Dept: FAMILY MEDICINE CLINIC | Facility: CLINIC | Age: 27
End: 2022-01-24

## 2022-01-24 DIAGNOSIS — N30.01 ACUTE CYSTITIS WITH HEMATURIA: Primary | ICD-10-CM

## 2022-01-24 DIAGNOSIS — M54.6 ACUTE LEFT-SIDED THORACIC BACK PAIN: ICD-10-CM

## 2022-01-24 DIAGNOSIS — R07.9 CHEST PAIN, UNSPECIFIED TYPE: ICD-10-CM

## 2022-01-24 PROCEDURE — 99214 OFFICE O/P EST MOD 30 MIN: CPT | Performed by: NURSE PRACTITIONER

## 2022-01-24 PROCEDURE — 1036F TOBACCO NON-USER: CPT | Performed by: NURSE PRACTITIONER

## 2022-01-24 NOTE — PROGRESS NOTES
Virtual Regular Visit    Verification of patient location:    Patient is located in the following state in which I hold an active license [place]      Assessment/Plan:    Problem List Items Addressed This Visit     None      Visit Diagnoses     Acute cystitis with hematuria    -  Primary    Finishing course of antibiotics  Stable  No issues  Acute left-sided thoracic back pain        Symptoms currently resolved  Suspect gas pain  Advise increasing fiber in diet  Chest pain, unspecified type            Symptoms resolved  Pt continues to do well overall  Recommend increasing fiber, advise she start OTC pepcid - possible silent reflux causing cough  Call with any issues  Reason for visit is   Chief Complaint   Patient presents with    Virtual Regular Visit        Encounter provider ALYSSA Eldridge    Provider located at [address] [place] [phone]      Recent Visits  Date Type Provider Dept   01/19/22 [facility], [facility] [employer] [facility]   Showing recent visits within past 7 days and meeting all other requirements  Today's Visits  Date Type Provider Dept   01/24/22 Telemedicine Godfrey Ingram, [facility] [employer] [facility]   Showing today's visits and meeting all other requirements  Future Appointments  No visits were found meeting these conditions  Showing future appointments within next 150 days and meeting all other requirements       The patient was identified by name and date of birth  Devaughn Bueno was informed that this is a telemedicine visit and that the visit is being conducted through [address] [place] [address] Now and patient was informed that this is a secure, HIPAA-compliant platform  She agrees to proceed     My office door was closed  No one else was in the room  She acknowledged consent and understanding of privacy and security of the video platform   The patient has agreed to participate and understands they can discontinue the visit at any time  Patient is aware this is a billable service  Deuce Desir is a 31year old female who is scheduled for a virtual recheck of back pain and chest pain  Reports that her symptoms have resolved with use of mucinex and tums gas relief  Reports she now has a dry cough that is persistent throughout the day  Denies fever, chills, chest pain, shortness of breath, n/v/d  Past Medical History:   Diagnosis Date    Asthma 2021    Wears glasses        Past Surgical History:   Procedure Laterality Date    FRACTURE SURGERY Right 2004    hip    HIP PINNING Right     Right hip pinning for fracture of growth plate at age 7       Current Outpatient Medications   Medication Sig Dispense Refill    albuterol (Ventolin HFA) 90 mcg/act inhaler Inhale 2 puffs every 4 (four) hours as needed for wheezing or shortness of breath 18 g 6    ciprofloxacin (CIPRO) 500 mg tablet Take 1 tablet (500 mg total) by mouth every 12 (twelve) hours for 7 days 14 tablet 0    Elastic Bandages & Supports (Medical Compression Socks) MISC Use daily (Patient not taking: Reported on 1/4/2022 ) 2 each 2    Loratadine (ALAVERT PO) Take 10 mg by mouth daily at bedtime       mometasone-formoterol (Dulera) 100-5 MCG/ACT inhaler Inhale 2 puffs 2 (two) times a day Rinse mouth after use  13 g 2     No current facility-administered medications for this visit  Allergies   Allergen Reactions    Penicillins Hives    Toradol [Ketorolac Tromethamine] Throat Swelling    Latex Rash    Singulair [Montelukast] Headache       Review of Systems   Constitutional: Negative for chills, diaphoresis, fatigue and fever  HENT: Negative for congestion, ear discharge, ear pain, postnasal drip, rhinorrhea, sinus pressure, sinus pain and sore throat  Eyes: Negative for pain and discharge  Respiratory: Positive for cough  Negative for chest tightness, shortness of breath and wheezing  Cardiovascular: Negative for chest pain  Gastrointestinal: Negative for diarrhea, nausea and vomiting  Genitourinary: Negative for dysuria  Musculoskeletal: Negative for back pain and myalgias  Skin: Negative for rash  Neurological: Negative for dizziness and headaches  Hematological: Negative for adenopathy  Video Exam    There were no vitals filed for this visit  Physical Exam  Vitals reviewed  Constitutional:       Appearance: Normal appearance  HENT:      Head: Normocephalic and atraumatic  Neurological:      Mental Status: She is alert and oriented to person, place, and time  Psychiatric:         Mood and Affect: Mood normal           I spent 20 minutes directly with the patient during this visit    VIRTUAL VISIT [address] [place] [address],[facility] verbally agrees to participate in [employer]  Pt is aware that [employer] could be limited without vital signs or the ability to perform a full hands-on physical exam  Trina Navarro understands she or the provider may request at any time to terminate the video visit and request the patient to seek care or treatment in person

## 2022-01-28 ENCOUNTER — HOSPITAL ENCOUNTER (OUTPATIENT)
Dept: RADIOLOGY | Facility: HOSPITAL | Age: 27
Discharge: HOME | End: 2022-01-28
Payer: COMMERCIAL

## 2022-01-28 DIAGNOSIS — R93.89 ABNORMAL ULTRASOUND: ICD-10-CM

## 2022-01-28 PROCEDURE — 76830 TRANSVAGINAL US NON-OB: CPT

## 2022-01-28 PROCEDURE — 76856 US EXAM PELVIC COMPLETE: CPT

## 2022-02-04 ENCOUNTER — IMMUNIZATIONS (OUTPATIENT)
Dept: FAMILY MEDICINE CLINIC | Facility: HOSPITAL | Age: 27
End: 2022-02-04

## 2022-02-04 DIAGNOSIS — Z23 ENCOUNTER FOR IMMUNIZATION: Primary | ICD-10-CM

## 2022-02-04 PROCEDURE — 91301 COVID-19 MODERNA VACC 0.5 ML: CPT

## 2022-02-04 PROCEDURE — 0012A COVID-19 MODERNA VACC 0.5 ML: CPT

## 2022-02-11 ENCOUNTER — TELEMEDICINE (OUTPATIENT)
Dept: FAMILY MEDICINE CLINIC | Facility: CLINIC | Age: 27
End: 2022-02-11
Payer: COMMERCIAL

## 2022-02-11 VITALS — HEIGHT: 66 IN | BODY MASS INDEX: 33.43 KG/M2 | WEIGHT: 208 LBS

## 2022-02-11 DIAGNOSIS — J04.0 LARYNGITIS: Primary | ICD-10-CM

## 2022-02-11 DIAGNOSIS — R05.9 COUGH: ICD-10-CM

## 2022-02-11 PROCEDURE — 99213 OFFICE O/P EST LOW 20 MIN: CPT | Performed by: NURSE PRACTITIONER

## 2022-02-11 RX ORDER — GUAIFENESIN AND CODEINE PHOSPHATE 100; 10 MG/5ML; MG/5ML
5 SOLUTION ORAL 3 TIMES DAILY PRN
Qty: 45 ML | Refills: 0 | Status: SHIPPED | OUTPATIENT
Start: 2022-02-11 | End: 2022-02-14

## 2022-02-11 NOTE — PROGRESS NOTES
Virtual Regular Visit    Verification of patient location:    Patient is located in the following state in which I hold an active license [place]      Assessment/Plan:    Problem List Items Addressed This Visit     None      Visit Diagnoses     Laryngitis    -  Primary    Recommend supportive care with fluids and voice rest  RTO if no improvement over the weekend  Cough        Relevant Medications    guaifenesin-codeine (GUAIFENESIN AC) 100-10 MG/5ML liquid               Reason for visit is   Chief Complaint   Patient presents with    Nasal Congestion     PND ,YELLOWISH     Cough     S/S STARTED 2/9/22    Laryngitis     VACCINATED     Virtual Regular Visit        Encounter provider ALYSSA Patel    Provider located at [address] [place] [address]  [phone]      Recent Visits  No visits were found meeting these conditions  Showing recent visits within past 7 days and meeting all other requirements  Today's Visits  Date Type Provider Dept   02/11/22 Telemedicine Maryann Cool, [facility]   Showing today's visits and meeting all other requirements  Future Appointments  No visits were found meeting these conditions  Showing future appointments within next 150 days and meeting all other requirements       The patient was identified by name and date of birth  Dustin Saleh was informed that this is a telemedicine visit and that the visit is being conducted through [address] [place] [address] Now and patient was informed that this is a secure, HIPAA-compliant platform  She agrees to proceed     My office door was closed  No one else was in the room  She acknowledged consent and understanding of privacy and security of the video platform  The patient has agreed to participate and understands they can discontinue the visit at any time  Patient is aware this is a billable service  Subjective    Cough  This is a new problem   The current episode started in the past 7 days (5 days ago)  The problem has been unchanged  The problem occurs constantly  The cough is productive of blood-tinged sputum  Pertinent negatives include no chest pain, chills, ear congestion, ear pain, fever, headaches, nasal congestion, postnasal drip, rhinorrhea, sore throat, shortness of breath or wheezing  Nothing aggravates the symptoms  She has tried OTC cough suppressant for the symptoms  The treatment provided no relief  Past Medical History:   Diagnosis Date    Asthma 2021    Wears glasses        Past Surgical History:   Procedure Laterality Date    FRACTURE SURGERY Right 2004    hip    HIP PINNING Right     Right hip pinning for fracture of growth plate at age 7       Current Outpatient Medications   Medication Sig Dispense Refill    albuterol (Ventolin HFA) 90 mcg/act inhaler Inhale 2 puffs every 4 (four) hours as needed for wheezing or shortness of breath 18 g 6    Loratadine (ALAVERT PO) Take 10 mg by mouth daily at bedtime       mometasone-formoterol (Dulera) 100-5 MCG/ACT inhaler Inhale 2 puffs 2 (two) times a day Rinse mouth after use  13 g 2    Elastic Bandages & Supports (Medical Compression Socks) MISC Use daily (Patient not taking: Reported on 1/4/2022 ) 2 each 2    guaifenesin-codeine (GUAIFENESIN AC) 100-10 MG/5ML liquid Take 5 mL by mouth 3 (three) times a day as needed for cough for up to 3 days 45 mL 0     No current facility-administered medications for this visit  Allergies   Allergen Reactions    Penicillins Hives    Toradol [Ketorolac Tromethamine] Throat Swelling    Latex Rash    Singulair [Montelukast] Headache       Review of Systems   Constitutional: Negative for chills, fatigue and fever  HENT: Positive for voice change (hoarse voice)  Negative for ear pain, postnasal drip, rhinorrhea and sore throat  Respiratory: Positive for cough  Negative for shortness of breath and wheezing      Cardiovascular: Negative for chest pain  Neurological: Negative for headaches  Video Exam    Vitals:    02/11/22 0835   Weight: 94 3 kg (208 lb)   Height: 5' 6" (1 676 m)       Physical Exam  Vitals reviewed  Constitutional:       Appearance: Normal appearance  Comments: Hoarse voice quality noted during interview   HENT:      Head: Normocephalic and atraumatic  Neurological:      Mental Status: She is alert and oriented to person, place, and time  Psychiatric:         Mood and Affect: Mood normal           I spent 15 minutes directly with the patient during this visit    VIRTUAL VISIT [address] [place] [address] verbally agrees to participate in [employer]  Pt is aware that [employer] could be limited without vital signs or the ability to perform a full hands-on physical exam  Trina Encinas understands she or the provider may request at any time to terminate the video visit and request the patient to seek care or treatment in person

## 2022-02-24 ENCOUNTER — OFFICE VISIT (OUTPATIENT)
Dept: FAMILY MEDICINE CLINIC | Facility: CLINIC | Age: 27
End: 2022-02-24
Payer: COMMERCIAL

## 2022-02-24 VITALS
DIASTOLIC BLOOD PRESSURE: 82 MMHG | OXYGEN SATURATION: 99 % | WEIGHT: 207 LBS | HEART RATE: 105 BPM | BODY MASS INDEX: 33.27 KG/M2 | SYSTOLIC BLOOD PRESSURE: 124 MMHG | TEMPERATURE: 97.5 F | RESPIRATION RATE: 16 BRPM | HEIGHT: 66 IN

## 2022-02-24 DIAGNOSIS — H65.91 RIGHT OTITIS MEDIA WITH EFFUSION: Primary | ICD-10-CM

## 2022-02-24 DIAGNOSIS — R09.82 POST-NASAL DRIP: ICD-10-CM

## 2022-02-24 DIAGNOSIS — R09.81 SINUS CONGESTION: ICD-10-CM

## 2022-02-24 PROCEDURE — 1036F TOBACCO NON-USER: CPT | Performed by: NURSE PRACTITIONER

## 2022-02-24 PROCEDURE — 3008F BODY MASS INDEX DOCD: CPT | Performed by: NURSE PRACTITIONER

## 2022-02-24 PROCEDURE — 99213 OFFICE O/P EST LOW 20 MIN: CPT | Performed by: NURSE PRACTITIONER

## 2022-02-24 RX ORDER — AZELASTINE HYDROCHLORIDE, FLUTICASONE PROPIONATE 137; 50 UG/1; UG/1
1 SPRAY, METERED NASAL 2 TIMES DAILY
Qty: 23 G | Refills: 0 | Status: SHIPPED | OUTPATIENT
Start: 2022-02-24 | End: 2022-06-03 | Stop reason: ALTCHOICE

## 2022-02-24 RX ORDER — AZITHROMYCIN 200 MG/5ML
POWDER, FOR SUSPENSION ORAL
Qty: 37.7 ML | Refills: 0 | Status: SHIPPED | OUTPATIENT
Start: 2022-02-24 | End: 2022-03-01

## 2022-02-24 NOTE — PROGRESS NOTES
Assessment/Plan:    1  Right otitis media with effusion  -     azithromycin (ZITHROMAX) 200 mg/5 mL suspension; Take 12 5 mL (500 mg total) by mouth daily for 1 day, THEN 6 3 mL (250 mg total) daily for 4 days  Patient Instructions   Increase fluid intake as tolerated  Rest and humidification   Continue medications as directed   - antibiotic for full course  - pro-biotic to protect stomach while on medication   - Flonase OTC 1-2 sprays each nostril daily PRN post nasal drip   - Mucinex OTC to loosen secretions   Return to office in one week if symptoms persist or worsen          Return in about 5 days (around 3/1/2022), or if symptoms worsen or fail to improve  Subjective:      Patient ID: Catrachita Dc is a 28 y o  female  Chief Complaint   Patient presents with    right  ear pain    Nasal Congestion     PND     Sore Throat    Cough       Sore Throat   This is a new problem  The current episode started today  The problem has been unchanged  There has been no fever  The pain is mild  Associated symptoms include coughing and ear pain  Pertinent negatives include no abdominal pain, congestion, diarrhea, ear discharge or shortness of breath  She has tried nothing for the symptoms  The treatment provided no relief  The following portions of the patient's history were reviewed and updated as appropriate: allergies, current medications, past family history, past medical history, past social history, past surgical history and problem list     Review of Systems   Constitutional: Negative for chills, fatigue and fever  HENT: Positive for ear pain, postnasal drip and sinus pressure  Negative for congestion, ear discharge, rhinorrhea, sinus pain and sore throat  Respiratory: Positive for cough  Negative for chest tightness and shortness of breath  Cardiovascular: Negative for chest pain  Gastrointestinal: Negative for abdominal pain, diarrhea and nausea           Current Outpatient Medications   Medication Sig Dispense Refill    albuterol (Ventolin HFA) 90 mcg/act inhaler Inhale 2 puffs every 4 (four) hours as needed for wheezing or shortness of breath 18 g 6    Loratadine (ALAVERT PO) Take 10 mg by mouth daily at bedtime       mometasone-formoterol (Dulera) 100-5 MCG/ACT inhaler Inhale 2 puffs 2 (two) times a day Rinse mouth after use  13 g 2    azithromycin (ZITHROMAX) 200 mg/5 mL suspension Take 12 5 mL (500 mg total) by mouth daily for 1 day, THEN 6 3 mL (250 mg total) daily for 4 days  37 7 mL 0     No current facility-administered medications for this visit  Objective:    /82   Pulse 105   Temp 97 5 °F (36 4 °C) (Temporal)   Resp 16   Ht 5' 6" (1 676 m)   Wt 93 9 kg (207 lb)   SpO2 99%   BMI 33 41 kg/m²        Physical Exam  Constitutional:       General: She is not in acute distress  Appearance: She is well-developed  She is not diaphoretic  HENT:      Head: Normocephalic and atraumatic  Right Ear: Ear canal and external ear normal  Tenderness present  No drainage or swelling  A middle ear effusion is present  Tympanic membrane is erythematous and bulging  Left Ear: Tympanic membrane, ear canal and external ear normal  No drainage, swelling or tenderness  No middle ear effusion  Nose: No mucosal edema or rhinorrhea  Right Sinus: No maxillary sinus tenderness or frontal sinus tenderness  Left Sinus: No maxillary sinus tenderness or frontal sinus tenderness  Mouth/Throat:      Pharynx: Uvula midline  No oropharyngeal exudate or posterior oropharyngeal erythema  Eyes:      General:         Right eye: No discharge  Left eye: No discharge  Conjunctiva/sclera: Conjunctivae normal    Neck:      Thyroid: No thyromegaly  Cardiovascular:      Rate and Rhythm: Normal rate and regular rhythm  Heart sounds: Normal heart sounds  Pulmonary:      Effort: Pulmonary effort is normal  No respiratory distress        Breath sounds: Normal breath sounds  No decreased breath sounds, wheezing, rhonchi or rales  Musculoskeletal:      Cervical back: Normal range of motion and neck supple  Lymphadenopathy:      Cervical: No cervical adenopathy  Skin:     General: Skin is warm and dry  Findings: No rash  Neurological:      Mental Status: She is alert and oriented to person, place, and time  Psychiatric:         Behavior: Behavior normal          Thought Content:  Thought content normal                 ALYSSA Espinal

## 2022-02-28 ENCOUNTER — TELEPHONE (OUTPATIENT)
Dept: FAMILY MEDICINE CLINIC | Facility: CLINIC | Age: 27
End: 2022-02-28

## 2022-02-28 NOTE — TELEPHONE ENCOUNTER
Azelastine-Fluticasone 137-50 MCG/ACT SUSP is not available    Trina picked Fluticasone over the counter    She needs a new RX for just the Azelastine    SR in [place]

## 2022-02-28 NOTE — TELEPHONE ENCOUNTER
Have her try fluticasone and see if this helps her first  Then if not, we can move on to something else

## 2022-02-28 NOTE — TELEPHONE ENCOUNTER
She should try the flonase for more time and see if this helps to improve her ear symptoms  Additionally, she should stop the flonase once she gets the other rx  Thanks!

## 2022-03-14 ENCOUNTER — TELEPHONE (OUTPATIENT)
Dept: GENETICS | Facility: CLINIC | Age: 27
End: 2022-03-14

## 2022-03-14 ENCOUNTER — APPOINTMENT (OUTPATIENT)
Dept: LAB | Facility: CLINIC | Age: 27
End: 2022-03-14
Payer: COMMERCIAL

## 2022-03-14 NOTE — TELEPHONE ENCOUNTER
I have called and left a message for the patient regarding a sample that has not been received by the lab for genetic testing  A letter will be sent out on 3/21 with the order being discontinued if the sample has not been collected

## 2022-03-30 ENCOUNTER — TELEPHONE (OUTPATIENT)
Dept: GENETICS | Facility: CLINIC | Age: 27
End: 2022-03-30

## 2022-03-30 NOTE — TELEPHONE ENCOUNTER
Post-Test Genetic Counseling Consult Note  Today I left a voicemail for Hal reviewing the results of her genetic test for hereditary cancer  We met previously on 7/14 for pre-test counseling  I encouraged her to call [phone] to discuss this result further  A copy of this consult note and genetic test result will be shared with the patient  SUMMARY:    Test(s): CancerNext (36 genes): APC, MIKAYLA, AXIN2 BARD1, BRCA1, BRCA2, BRIP1, BMPR1A, CDH1, CDK4, CDKN2A, CHEK2, DICER1, EPCAM, GREM1, HOXB13, MLH1, MSH2, MSH3, MSH6, MUTYH, NBN, NF1, NTHL1, PALB2, PMS2, POLD1, POLE, PTEN, RAD51C, RAD51D, RECQL SMAD4, SMARCA4, STK11, TP53    Result: Negative - No Clinically Significant Variants Detected      Assessment:   A negative result significantly reduces the likelihood that Hal has a hereditary cancer syndrome  However, this testing is unable to completely rule out the presence of hereditary cancer  It remains possible that:  - There is a variant in an area of a gene which was not tested or there is a variant not detectable due to technical limitations of this test      - There is a variant in another gene that was not included in this test or in a gene not known to be linked to cancer or tumors  - A family member has a genetic variant that the patient did not inherit  - The cancer in the family is sporadic and is related to non-hereditary factors  Risk based on Family History:    Trina's risk of ovarian cancer is increased, based on the reported family history  As compared with the general population risk of approximately 1 5%, empiric data suggest that Jose As risk to develop ovarian cancer is approximately 3% given one second degree relative with ovarian cancer   It is important to note that this may be an overestimate of risk, as the BRCA1/2 status of the families used to generate this risk figure is unknown, [PMID: [id number]      Risks and Testing for Family Members:    Despite a negative result, Trina's first-degree relatives may be at increased risk for the cancers based on the family history  We recommend they discuss screening and management recommendations with their healthcare providers  If Gurmeet Bruner has any affected family members with a cancer diagnosis, especially at a young age, they may still consider genetic testing  Relatives who wish to pursue genetic testing can reach out to the [facility] ([phone]) to schedule an appointment or visit www [facility] org to identify a local genetic counselor  Additional Information:  A healthy lifestyle will improve overall health and reduce risk for illness  Eating a healthy diet and exercising for 4 hours per week is recommended  Both diet and exercise have been shown to help maintain a healthy weight  Postmenopausal women who are overweight are at higher risk for breast cancer  Moderate to heavy alcohol use can increase the risk for some cancers  Smoking cigarettes can also increase risk for breast, lung, prostate, pancreatic and other cancers  Plan:   There are no additional recommendations based on Trina's negative result  she should continue cancer screening and medical management as clinically indicated and as determined appropriate by her healthcare providers  Negative Result: Gurmeet Bruner was strongly encouraged to contact us regarding any changes in her personal or family history of cancer as these changes could alter our recommendation regarding genetic testing and/or cancer screening

## 2022-04-04 ENCOUNTER — TELEPHONE (OUTPATIENT)
Dept: GENETICS | Facility: CLINIC | Age: 27
End: 2022-04-04

## 2022-04-04 NOTE — TELEPHONE ENCOUNTER
Harpreet Shieldsgle called into our program with questions regarding her genetic test result  She inquired about her residual risk and if there are any signs of cancer to look out for  I reviewed her residual risk for ovarian cancer is 3% based on her family history  As far as signs and symptoms, I could not point to specific early indications for her, but encouraged her to discuss any changes in her normal health with her healthcare providers  Harpreet Shieldsgle asked about effective screening for gynecologic cancer  She is unsure if her mother had cervical or another GYN type, since her mother's mass was too large at the point of diagnosis for her doctors to tell  I reviewed that the screening for ovarian cancer is not as effective at picking up early stage cancer as a mammogram or colonoscopy is  We discussed continuing to screen as recommended by her healthcare providers, and checking in with her OBGYN if any additional screening was recommended  Harpreet Carlito asked about specific diets that would be beneficial, for example, cutting out sugar  I discussed with her that we encourage overall healthy eating habits, but do not have any specific diet to recommend  Finally, Harpreet Shieldsgle asked about testing for her sisters  I reviewed that her sisters would meet NCCN criteria, and it would be possible for them to have a hereditary condition that was not passed on to Harpreet Esteban  I recommended that they bring any updated information on their mother's pathology to the appointment  Harpreet Esteban expressed that she wants to do everything possible to protect herself against cancer risk  After losing her mother, she is concerned about developing cancer herself  We discussed the frustration of not having gynecologic screening available, and that she should continue to follow screening recommended by her healthcare providers and continue checking in on updates to screening  I encouraged Harpreet Esteban to call our office with any questions

## 2022-04-04 NOTE — TELEPHONE ENCOUNTER
----- Message from Lamar Welch sent at 3/30/2022  4:20 PM EDT -----  Regarding: complete chart  GC Completed Chart     Result Type: Negative    Result Delivery: [employer]Dochart Message    Monthly Review: Does not need monthly review- COMPLETE

## 2022-05-19 ENCOUNTER — TELEMEDICINE (OUTPATIENT)
Dept: FAMILY MEDICINE CLINIC | Facility: CLINIC | Age: 27
End: 2022-05-19
Payer: COMMERCIAL

## 2022-05-19 VITALS — HEIGHT: 66 IN | WEIGHT: 207 LBS | BODY MASS INDEX: 33.27 KG/M2

## 2022-05-19 DIAGNOSIS — M54.50 CHRONIC MIDLINE LOW BACK PAIN WITHOUT SCIATICA: ICD-10-CM

## 2022-05-19 DIAGNOSIS — R10.32 LLQ PAIN: ICD-10-CM

## 2022-05-19 DIAGNOSIS — G89.29 CHRONIC MIDLINE LOW BACK PAIN WITHOUT SCIATICA: ICD-10-CM

## 2022-05-19 DIAGNOSIS — F41.9 ANXIETY: ICD-10-CM

## 2022-05-19 DIAGNOSIS — R53.82 CHRONIC FATIGUE: ICD-10-CM

## 2022-05-19 DIAGNOSIS — G47.9 SLEEP DISTURBANCE: ICD-10-CM

## 2022-05-19 DIAGNOSIS — R82.90 CLOUDY URINE: ICD-10-CM

## 2022-05-19 DIAGNOSIS — F43.21 GRIEF REACTION: Primary | ICD-10-CM

## 2022-05-19 PROCEDURE — 99214 OFFICE O/P EST MOD 30 MIN: CPT | Performed by: NURSE PRACTITIONER

## 2022-05-19 PROCEDURE — 1036F TOBACCO NON-USER: CPT | Performed by: NURSE PRACTITIONER

## 2022-05-19 PROCEDURE — 3008F BODY MASS INDEX DOCD: CPT | Performed by: NURSE PRACTITIONER

## 2022-05-19 RX ORDER — ESCITALOPRAM OXALATE 10 MG/1
10 TABLET ORAL DAILY
Qty: 90 TABLET | Refills: 0 | Status: SHIPPED | OUTPATIENT
Start: 2022-05-19 | End: 2022-05-19

## 2022-05-19 RX ORDER — ESCITALOPRAM OXALATE 5 MG/5ML
10 SOLUTION ORAL DAILY
Qty: 240 ML | Refills: 0 | Status: SHIPPED | OUTPATIENT
Start: 2022-05-19 | End: 2022-06-03 | Stop reason: SDUPTHER

## 2022-05-19 RX ORDER — ALPRAZOLAM 0.25 MG/1
0.25 TABLET ORAL
Qty: 15 TABLET | Refills: 0 | Status: SHIPPED | OUTPATIENT
Start: 2022-05-19

## 2022-05-19 NOTE — PROGRESS NOTES
Virtual Regular Visit    Verification of patient location:    Patient is located in the following state in which I hold an active license [place]      Assessment/Plan:    Problem List Items Addressed This Visit        Other    Grief reaction - Primary    Relevant Medications    ALPRAZolam (XANAX) 0 25 mg tablet    escitalopram (LEXAPRO) 5 MG/5ML solution      Other Visit Diagnoses     Chronic fatigue        Relevant Orders    CBC and differential    Lyme Antibody Profile with reflex to WB    Sedimentation rate, automated    Ferritin    Vitamin B12    TIBC    Chronic midline low back pain without sciatica        Relevant Orders    CBC and differential    Lyme Antibody Profile with reflex to WB    Sedimentation rate, automated    Ferritin    Vitamin B12    TIBC    Sleep disturbance        Relevant Medications    ALPRAZolam (XANAX) 0 25 mg tablet    escitalopram (LEXAPRO) 5 MG/5ML solution    Anxiety        Relevant Medications    ALPRAZolam (XANAX) 0 25 mg tablet    escitalopram (LEXAPRO) 5 MG/5ML solution    Cloudy urine        Relevant Orders    UA w Reflex to Microscopic w Reflex to Culture -Lab Collect    LLQ pain        Relevant Orders    UA w Reflex to Microscopic w Reflex to Culture -Lab Collect               Reason for visit is   Chief Complaint   Patient presents with    Anxiety     Pt here to discuss anxiety    Virtual Regular Visit        Encounter provider Abilio Avila, [address] [place] [address]    Provider located at [address] [place] [address]  [phone]      Recent Visits  No visits were found meeting these conditions  Showing recent visits within past 7 days and meeting all other requirements  Today's Visits  Date Type Provider Dept   05/19/22 Telemedicine Abilio Avila, [facility]   Showing today's visits and meeting all other requirements  Future Appointments  No visits were found meeting these conditions    Showing future appointments within next 150 days and meeting all other requirements       The patient was identified by name and date of birth  Radha Gunter was informed that this is a telemedicine visit and that the visit is being conducted through [address] Now and patient was informed that this is a secure, HIPAA-compliant platform  She agrees to proceed     My office door was closed  No one else was in the room  She acknowledged consent and understanding of privacy and security of the video platform  The patient has agreed to participate and understands they can discontinue the visit at any time  Patient is aware this is a billable service  Brandt Mcfadden is a 28 y o  female who has scheduled a virtual visit to discuss anxiety  Reports that her symptoms have been persistent since the passing of her mother from cervical cancer one year ago  Pt reports that now that is coming close to the one year jonnie, her symptoms have become much worse  States she does not feel like herself and also has periods of crying during work  States she does not feel like she is in control of her emotions  In addition, she reports nightmares about being present while her mom passed  States that she has chronic fatigue during the day along with back and bilateral leg aching  She has chronic LLQ pain that comes and goes and has had normal US of the area           Past Medical History:   Diagnosis Date    Asthma 2021    Wears glasses        Past Surgical History:   Procedure Laterality Date    FRACTURE SURGERY Right 2004    hip    HIP PINNING Right     Right hip pinning for fracture of growth plate at age 7       Current Outpatient Medications   Medication Sig Dispense Refill    albuterol (Ventolin HFA) 90 mcg/act inhaler Inhale 2 puffs every 4 (four) hours as needed for wheezing or shortness of breath 18 g 6    ALPRAZolam (XANAX) 0 25 mg tablet Take 1 tablet (0 25 mg total) by mouth daily at bedtime as needed for anxiety 15 tablet 0    escitalopram (LEXAPRO) 5 MG/5ML solution Take 10 mL (10 mg total) by mouth in the morning  240 mL 0    Loratadine (ALAVERT PO) Take 10 mg by mouth daily at bedtime       mometasone-formoterol (Dulera) 100-5 MCG/ACT inhaler Inhale 2 puffs 2 (two) times a day Rinse mouth after use  13 g 2    Azelastine-Fluticasone 137-50 MCG/ACT SUSP 1 spray into each nostril 2 (two) times a day (Patient not taking: No sig reported) 23 g 0     No current facility-administered medications for this visit  Allergies   Allergen Reactions    Penicillins Hives    Toradol [Ketorolac Tromethamine] Throat Swelling    Latex Rash    Singulair [Montelukast] Headache       Review of Systems   Constitutional: Positive for fatigue  Negative for chills and fever  Respiratory: Negative for cough, chest tightness and shortness of breath  Cardiovascular: Negative for chest pain  Musculoskeletal: Positive for back pain and myalgias  Psychiatric/Behavioral: Negative for sleep disturbance  The patient is nervous/anxious  Video Exam    Vitals:    05/19/22 1136   Weight: 93 9 kg (207 lb)   Height: 5' 6" (1 676 m)       Physical Exam  Vitals reviewed  Constitutional:       Appearance: Normal appearance  HENT:      Head: Normocephalic and atraumatic  Neurological:      Mental Status: She is alert and oriented to person, place, and time  Psychiatric:         Mood and Affect: Mood normal  Affect is tearful  I spent 25 minutes directly with the patient during this visit    VIRTUAL VISIT [address] [place] [address] verbally agrees to participate in [employer]  Pt is aware that [employer] could be limited without vital signs or the ability to perform a full hands-on physical exam  Trina Crispin Pena understands she or the provider may request at any time to terminate the video visit and request the patient to seek care or treatment in person

## 2022-05-21 LAB
APPEARANCE UR: CLEAR
B BURGDOR AB SER IA-ACNC: <0.9 INDEX
BASOPHILS # BLD AUTO: 80 CELLS/UL (ref 0–200)
BASOPHILS NFR BLD AUTO: 1.4 %
BILIRUB UR QL STRIP: NEGATIVE
COLOR UR: YELLOW
EOSINOPHIL # BLD AUTO: 148 CELLS/UL (ref 15–500)
EOSINOPHIL NFR BLD AUTO: 2.6 %
ERYTHROCYTE [DISTWIDTH] IN BLOOD BY AUTOMATED COUNT: 13.4 % (ref 11–15)
ERYTHROCYTE [SEDIMENTATION RATE] IN BLOOD BY WESTERGREN METHOD: 14 MM/H
FERRITIN SERPL-MCNC: 36 NG/ML (ref 16–154)
GLUCOSE UR QL STRIP: NEGATIVE
HCT VFR BLD AUTO: 41.9 % (ref 35–45)
HGB BLD-MCNC: 13.9 G/DL (ref 11.7–15.5)
HGB UR QL STRIP: NEGATIVE
IRON SATN MFR SERPL: 23 % (CALC) (ref 16–45)
IRON SERPL-MCNC: 79 MCG/DL (ref 40–190)
KETONES UR QL STRIP: NEGATIVE
LEUKOCYTE ESTERASE UR QL STRIP: NEGATIVE
LYMPHOCYTES # BLD AUTO: 2360 CELLS/UL (ref 850–3900)
LYMPHOCYTES NFR BLD AUTO: 41.4 %
MCH RBC QN AUTO: 28.3 PG (ref 27–33)
MCHC RBC AUTO-ENTMCNC: 33.2 G/DL (ref 32–36)
MCV RBC AUTO: 85.3 FL (ref 80–100)
MONOCYTES # BLD AUTO: 485 CELLS/UL (ref 200–950)
MONOCYTES NFR BLD AUTO: 8.5 %
NEUTROPHILS # BLD AUTO: 2628 CELLS/UL (ref 1500–7800)
NEUTROPHILS NFR BLD AUTO: 46.1 %
NITRITE UR QL STRIP: NEGATIVE
PH UR STRIP: 8 [PH] (ref 5–8)
PLATELET # BLD AUTO: 405 THOUSAND/UL (ref 140–400)
PMV BLD REES-ECKER: 9.2 FL (ref 7.5–12.5)
PROT UR QL STRIP: NEGATIVE
RBC # BLD AUTO: 4.91 MILLION/UL (ref 3.8–5.1)
SP GR UR STRIP: 1 (ref 1–1.03)
TIBC SERPL-MCNC: 349 MCG/DL (CALC) (ref 250–450)
VIT B12 SERPL-MCNC: 526 PG/ML (ref 200–1100)
WBC # BLD AUTO: 5.7 THOUSAND/UL (ref 3.8–10.8)

## 2022-06-03 ENCOUNTER — OFFICE VISIT (OUTPATIENT)
Dept: FAMILY MEDICINE CLINIC | Facility: CLINIC | Age: 27
End: 2022-06-03
Payer: COMMERCIAL

## 2022-06-03 VITALS
OXYGEN SATURATION: 100 % | RESPIRATION RATE: 12 BRPM | SYSTOLIC BLOOD PRESSURE: 118 MMHG | BODY MASS INDEX: 32.62 KG/M2 | HEIGHT: 66 IN | DIASTOLIC BLOOD PRESSURE: 80 MMHG | HEART RATE: 78 BPM | WEIGHT: 203 LBS | TEMPERATURE: 97 F

## 2022-06-03 DIAGNOSIS — G47.9 SLEEP DISTURBANCE: ICD-10-CM

## 2022-06-03 DIAGNOSIS — J01.40 ACUTE NON-RECURRENT PANSINUSITIS: ICD-10-CM

## 2022-06-03 DIAGNOSIS — F43.23 ADJUSTMENT DISORDER WITH MIXED ANXIETY AND DEPRESSED MOOD: Primary | ICD-10-CM

## 2022-06-03 DIAGNOSIS — F43.21 GRIEF REACTION: ICD-10-CM

## 2022-06-03 DIAGNOSIS — F41.9 ANXIETY: ICD-10-CM

## 2022-06-03 PROCEDURE — 99213 OFFICE O/P EST LOW 20 MIN: CPT | Performed by: NURSE PRACTITIONER

## 2022-06-03 RX ORDER — ESCITALOPRAM OXALATE 5 MG/5ML
10 SOLUTION ORAL DAILY
Qty: 300 ML | Refills: 2 | Status: SHIPPED | OUTPATIENT
Start: 2022-06-03 | End: 2022-07-15 | Stop reason: SDUPTHER

## 2022-06-03 RX ORDER — AZITHROMYCIN 250 MG/1
TABLET, FILM COATED ORAL
Qty: 6 TABLET | Refills: 0 | Status: SHIPPED | OUTPATIENT
Start: 2022-06-03 | End: 2022-06-07

## 2022-06-03 NOTE — PROGRESS NOTES
Assessment/Plan:    1  Adjustment disorder with mixed anxiety and depressed mood  Assessment & Plan:  Pt reports improvement lexapro  Advise that she continue as directed  Encourage counseling  2  Grief reaction  -     escitalopram (LEXAPRO) 5 MG/5ML solution; Take 10 mL (10 mg total) by mouth daily    3  Acute non-recurrent pansinusitis  -     azithromycin (ZITHROMAX) 250 mg tablet; Take 2 tablets PO on day one, then take 1 tablet PO daily x's 4 days    4  Sleep disturbance  -     escitalopram (LEXAPRO) 5 MG/5ML solution; Take 10 mL (10 mg total) by mouth daily    5  Anxiety  -     escitalopram (LEXAPRO) 5 MG/5ML solution; Take 10 mL (10 mg total) by mouth daily          Patient Instructions   Increase fluid intake as tolerated  Rest and humidification   Continue medications as directed   - antibiotic for full course  - pro-biotic to protect stomach while on medication   - Flonase OTC 1-2 sprays each nostril daily PRN post nasal drip   - Mucinex OTC to loosen secretions   Return to office in one week if symptoms persist or worsen        Return in about 3 months (around 9/3/2022) for Recheck  Subjective:      Patient ID: Sherren Age is a 28 y o  female  Chief Complaint   Patient presents with    Follow-up     Pt here for anxiety f/u and also not feeling well       Edel Mcbride is a 28year old female with depression and anxiety who presents to the office for a 2 week recheck of anxiety  Pt reports she already has improvement in her anxiety  States that she is no longer having crying spells  Taking medication at night  Sinusitis  This is a new problem  The current episode started 1 to 4 weeks ago  The problem is unchanged  There has been no fever  The pain is mild  Associated symptoms include congestion and sinus pressure  Pertinent negatives include no chills, coughing, ear pain, headaches, shortness of breath, sore throat or swollen glands  Past treatments include nothing         The following portions of the patient's history were reviewed and updated as appropriate: allergies, current medications, past family history, past medical history, past social history, past surgical history and problem list     Review of Systems   Constitutional: Negative for chills, fatigue and fever  HENT: Positive for congestion, postnasal drip, sinus pressure and sinus pain  Negative for ear pain, rhinorrhea and sore throat  Respiratory: Negative for cough, chest tightness and shortness of breath  Cardiovascular: Negative for chest pain  Neurological: Negative for headaches  Psychiatric/Behavioral: Negative for sleep disturbance  The patient is not nervous/anxious  Current Outpatient Medications   Medication Sig Dispense Refill    albuterol (Ventolin HFA) 90 mcg/act inhaler Inhale 2 puffs every 4 (four) hours as needed for wheezing or shortness of breath 18 g 6    ALPRAZolam (XANAX) 0 25 mg tablet Take 1 tablet (0 25 mg total) by mouth daily at bedtime as needed for anxiety 15 tablet 0    azithromycin (ZITHROMAX) 250 mg tablet Take 2 tablets PO on day one, then take 1 tablet PO daily x's 4 days 6 tablet 0    escitalopram (LEXAPRO) 5 MG/5ML solution Take 10 mL (10 mg total) by mouth daily 300 mL 2    Loratadine (ALAVERT PO) Take 10 mg by mouth daily at bedtime       mometasone-formoterol (Dulera) 100-5 MCG/ACT inhaler Inhale 2 puffs 2 (two) times a day Rinse mouth after use  13 g 2     No current facility-administered medications for this visit  Objective:    /80   Pulse 78   Temp (!) 97 °F (36 1 °C) (Temporal)   Resp 12   Ht 5' 6" (1 676 m)   Wt 92 1 kg (203 lb)   SpO2 100%   BMI 32 77 kg/m²        Physical Exam  Vitals reviewed  Constitutional:       General: She is not in acute distress  Appearance: Normal appearance  She is well-developed  She is not diaphoretic  HENT:      Head: Normocephalic and atraumatic        Right Ear: Ear canal and external ear normal  No drainage, swelling or tenderness  No middle ear effusion  Tympanic membrane is bulging  Left Ear: Ear canal and external ear normal  No drainage, swelling or tenderness  No middle ear effusion  Tympanic membrane is bulging  Nose: Rhinorrhea present  No mucosal edema  Rhinorrhea is clear  Right Sinus: Maxillary sinus tenderness present  No frontal sinus tenderness  Left Sinus: Maxillary sinus tenderness present  No frontal sinus tenderness  Mouth/Throat:      Pharynx: Uvula midline  Posterior oropharyngeal erythema present  No oropharyngeal exudate  Eyes:      General:         Right eye: No discharge  Left eye: No discharge  Conjunctiva/sclera: Conjunctivae normal    Neck:      Thyroid: No thyromegaly  Cardiovascular:      Rate and Rhythm: Normal rate and regular rhythm  Heart sounds: Normal heart sounds  Pulmonary:      Effort: Pulmonary effort is normal  No respiratory distress  Breath sounds: Normal breath sounds  No decreased breath sounds, wheezing, rhonchi or rales  Musculoskeletal:      Cervical back: Normal range of motion and neck supple  Lymphadenopathy:      Cervical: No cervical adenopathy  Skin:     General: Skin is warm and dry  Findings: No rash  Neurological:      Mental Status: She is alert and oriented to person, place, and time  Psychiatric:         Behavior: Behavior normal          Thought Content:  Thought content normal          Judgment: Judgment normal                 ALYSSA Mckeon

## 2022-06-24 ENCOUNTER — TELEMEDICINE (OUTPATIENT)
Dept: FAMILY MEDICINE CLINIC | Facility: CLINIC | Age: 27
End: 2022-06-24
Payer: COMMERCIAL

## 2022-06-24 VITALS — HEIGHT: 66 IN | BODY MASS INDEX: 32.62 KG/M2 | WEIGHT: 203 LBS

## 2022-06-24 DIAGNOSIS — F43.21 GRIEF REACTION: ICD-10-CM

## 2022-06-24 DIAGNOSIS — F43.23 ADJUSTMENT DISORDER WITH MIXED ANXIETY AND DEPRESSED MOOD: Primary | ICD-10-CM

## 2022-06-24 PROBLEM — U07.1 COVID-19: Status: RESOLVED | Noted: 2021-10-05 | Resolved: 2022-06-24

## 2022-06-24 PROBLEM — G44.52 NEW DAILY PERSISTENT HEADACHE: Status: RESOLVED | Noted: 2021-10-22 | Resolved: 2022-06-24

## 2022-06-24 PROCEDURE — 99214 OFFICE O/P EST MOD 30 MIN: CPT | Performed by: NURSE PRACTITIONER

## 2022-06-24 PROCEDURE — 3008F BODY MASS INDEX DOCD: CPT | Performed by: NURSE PRACTITIONER

## 2022-06-24 PROCEDURE — 1036F TOBACCO NON-USER: CPT | Performed by: NURSE PRACTITIONER

## 2022-06-24 NOTE — PROGRESS NOTES
Virtual Regular Visit    Verification of patient location:    Patient is located in the following state in which I hold an active license [place]      Assessment/Plan:    Problem List Items Addressed This Visit        Other    Grief reaction    Relevant Orders    Ambulatory Referral to Amandeep Guthrie    Adjustment disorder with mixed anxiety and depressed mood - Primary     Pt reports initial improvement but now has increased feelings of depression  Advise that she increase daily dosing of lexapro from 10 mg to 20 mg daily  She has not yet used alprazolam - doing well overall  Relevant Orders    Ambulatory Referral to Amandeep Guthrie               Reason for visit is   Chief Complaint   Patient presents with    Medication Management    Virtual Regular Visit        Encounter provider Lynne Lopez, [facility]    Provider located at [address] [place] [address]  [id number]      Recent Visits  No visits were found meeting these conditions  Showing recent visits within past 7 days and meeting all other requirements  Today's Visits  Date Type Provider Dept   06/24/22 Telemedicine Lynne Lopez, [facility]   Showing today's visits and meeting all other requirements  Future Appointments  No visits were found meeting these conditions  Showing future appointments within next 150 days and meeting all other requirements       The patient was identified by name and date of birth  Varunarchie Newton was informed that this is a telemedicine visit and that the visit is being conducted through [address] [place] [address] Now and patient was informed that this is a secure, HIPAA-compliant platform  She agrees to proceed     My office door was closed  No one else was in the room  She acknowledged consent and understanding of privacy and security of the video platform   The patient has agreed to participate and understands they can discontinue the visit at any time  Patient is aware this is a billable service  Rukhsana Braun is a 28 y o  female who is scheduled for a virtual visit to discuss medication management  Pt reports that she had initial improvement after starting lexapro 10 mg daily and then she has noticed that she has been sleeping more and not wanting to be around her loved ones  States that she feels more depressed  Past Medical History:   Diagnosis Date    Asthma 2021    Wears glasses        Past Surgical History:   Procedure Laterality Date    FRACTURE SURGERY Right 2004    hip    HIP PINNING Right     Right hip pinning for fracture of growth plate at age 7       Current Outpatient Medications   Medication Sig Dispense Refill    albuterol (Ventolin HFA) 90 mcg/act inhaler Inhale 2 puffs every 4 (four) hours as needed for wheezing or shortness of breath 18 g 6    ALPRAZolam (XANAX) 0 25 mg tablet Take 1 tablet (0 25 mg total) by mouth daily at bedtime as needed for anxiety 15 tablet 0    escitalopram (LEXAPRO) 5 MG/5ML solution Take 10 mL (10 mg total) by mouth daily 300 mL 2    Loratadine (ALAVERT PO) Take 10 mg by mouth daily at bedtime       mometasone-formoterol (Dulera) 100-5 MCG/ACT inhaler Inhale 2 puffs 2 (two) times a day Rinse mouth after use  13 g 2     No current facility-administered medications for this visit  Allergies   Allergen Reactions    Penicillins Hives    Toradol [Ketorolac Tromethamine] Throat Swelling    Latex Rash    Singulair [Montelukast] Headache       Review of Systems   Psychiatric/Behavioral: Negative for self-injury, sleep disturbance and suicidal ideas  The patient is not nervous/anxious  Increased depression       Video Exam    Vitals:    06/24/22 0749   Weight: 92 1 kg (203 lb)   Height: 5' 6" (1 676 m)       Physical Exam  Vitals reviewed  Constitutional:       Appearance: Normal appearance  HENT:      Head: Normocephalic and atraumatic     Neurological: Mental Status: She is alert and oriented to person, place, and time  Psychiatric:         Mood and Affect: Mood normal           I spent 20 minutes directly with the patient during this visit    VIRTUAL VISIT [address] verbally agrees to participate in [employer]  Pt is aware that [employer] could be limited without vital signs or the ability to perform a full hands-on physical exam  Trina Mayers understands she or the provider may request at any time to terminate the video visit and request the patient to seek care or treatment in person

## 2022-06-24 NOTE — ASSESSMENT & PLAN NOTE
Pt reports initial improvement but now has increased feelings of depression  Advise that she increase daily dosing of lexapro from 10 mg to 20 mg daily  She has not yet used alprazolam - doing well overall

## 2022-07-12 DIAGNOSIS — Z11.59 ENCOUNTER FOR HEPATITIS C SCREENING TEST FOR LOW RISK PATIENT: ICD-10-CM

## 2022-07-12 DIAGNOSIS — Z13.220 SCREENING FOR LIPID DISORDERS: ICD-10-CM

## 2022-07-12 DIAGNOSIS — Z11.4 ENCOUNTER FOR SCREENING FOR HIV: ICD-10-CM

## 2022-07-12 DIAGNOSIS — Z13.6 SCREENING FOR HYPERTENSION: ICD-10-CM

## 2022-07-12 DIAGNOSIS — Z13.29 SCREENING FOR THYROID DISORDER: ICD-10-CM

## 2022-07-12 DIAGNOSIS — Z00.00 ENCOUNTER FOR PHYSICAL EXAMINATION: Primary | ICD-10-CM

## 2022-07-15 ENCOUNTER — OFFICE VISIT (OUTPATIENT)
Dept: FAMILY MEDICINE CLINIC | Facility: CLINIC | Age: 27
End: 2022-07-15
Payer: COMMERCIAL

## 2022-07-15 ENCOUNTER — TELEPHONE (OUTPATIENT)
Dept: FAMILY MEDICINE CLINIC | Facility: CLINIC | Age: 27
End: 2022-07-15

## 2022-07-15 VITALS
TEMPERATURE: 97.7 F | BODY MASS INDEX: 32.3 KG/M2 | HEIGHT: 66 IN | RESPIRATION RATE: 12 BRPM | OXYGEN SATURATION: 98 % | SYSTOLIC BLOOD PRESSURE: 112 MMHG | DIASTOLIC BLOOD PRESSURE: 74 MMHG | WEIGHT: 201 LBS | HEART RATE: 88 BPM

## 2022-07-15 DIAGNOSIS — F41.9 ANXIETY: ICD-10-CM

## 2022-07-15 DIAGNOSIS — F43.21 GRIEF REACTION: ICD-10-CM

## 2022-07-15 DIAGNOSIS — G47.9 SLEEP DISTURBANCE: ICD-10-CM

## 2022-07-15 PROCEDURE — 3725F SCREEN DEPRESSION PERFORMED: CPT | Performed by: NURSE PRACTITIONER

## 2022-07-15 PROCEDURE — 99214 OFFICE O/P EST MOD 30 MIN: CPT | Performed by: NURSE PRACTITIONER

## 2022-07-15 RX ORDER — ESCITALOPRAM OXALATE 5 MG/5ML
20 SOLUTION ORAL DAILY
Qty: 600 ML | Refills: 2 | Status: SHIPPED | OUTPATIENT
Start: 2022-07-15 | End: 2022-07-19

## 2022-07-15 NOTE — PROGRESS NOTES
Assessment/Plan:    1  Grief reaction  -     escitalopram (LEXAPRO) 5 MG/5ML solution; Take 20 mL (20 mg total) by mouth daily    2  Sleep disturbance  -     escitalopram (LEXAPRO) 5 MG/5ML solution; Take 20 mL (20 mg total) by mouth daily    3  Anxiety  -     escitalopram (LEXAPRO) 5 MG/5ML solution; Take 20 mL (20 mg total) by mouth daily            Return in about 4 weeks (around 8/12/2022) for Recheck, Med Check  Subjective:      Patient ID: Nelson Santa is a 28 y o  female  Chief Complaint   Patient presents with   Danilo Rey is a 28year old female who presents to the office to discuss medication, depression and anxiety  Reports her symptoms are only slightly improved and states that her depression and anxiety are continuing to affect her day to day activities  States she does not feel like getting out of her bed  The following portions of the patient's history were reviewed and updated as appropriate: allergies, current medications, past family history, past medical history, past social history, past surgical history and problem list     Review of Systems   Psychiatric/Behavioral: Positive for sleep disturbance  The patient is nervous/anxious  Current Outpatient Medications   Medication Sig Dispense Refill    albuterol (Ventolin HFA) 90 mcg/act inhaler Inhale 2 puffs every 4 (four) hours as needed for wheezing or shortness of breath 18 g 6    ALPRAZolam (XANAX) 0 25 mg tablet Take 1 tablet (0 25 mg total) by mouth daily at bedtime as needed for anxiety 15 tablet 0    escitalopram (LEXAPRO) 5 MG/5ML solution Take 20 mL (20 mg total) by mouth daily 600 mL 2    Loratadine (ALAVERT PO) Take 10 mg by mouth daily at bedtime       mometasone-formoterol (Dulera) 100-5 MCG/ACT inhaler Inhale 2 puffs 2 (two) times a day Rinse mouth after use  13 g 2     No current facility-administered medications for this visit         Objective:    /74 (BP Location: Right arm, Patient Position: Sitting, Cuff Size: Large)   Pulse 88   Temp 97 7 °F (36 5 °C) (Temporal)   Resp 12   Ht 5' 6" (1 676 m)   Wt 91 2 kg (201 lb)   LMP 07/11/2022 (Exact Date)   SpO2 98%   BMI 32 44 kg/m²        Physical Exam  Vitals reviewed  Constitutional:       General: She is not in acute distress  Appearance: She is well-developed  She is not diaphoretic  HENT:      Head: Normocephalic and atraumatic  Eyes:      General:         Right eye: No discharge  Left eye: No discharge  Conjunctiva/sclera: Conjunctivae normal    Cardiovascular:      Rate and Rhythm: Normal rate and regular rhythm  Heart sounds: Normal heart sounds  Pulmonary:      Effort: Pulmonary effort is normal  No respiratory distress  Breath sounds: Normal breath sounds  No decreased breath sounds, wheezing, rhonchi or rales  Skin:     General: Skin is warm and dry  Findings: No rash  Neurological:      Mental Status: She is alert and oriented to person, place, and time  Psychiatric:         Behavior: Behavior normal          Thought Content:  Thought content normal          Judgment: Judgment normal                 ALYSSA Dugan

## 2022-07-15 NOTE — TELEPHONE ENCOUNTER
Stated that you told her that if she had any further questions to call  She did not want to send it through my chart because it is too private      She wanted to know if you can call her

## 2022-07-19 DIAGNOSIS — F43.23 ADJUSTMENT DISORDER WITH MIXED ANXIETY AND DEPRESSED MOOD: Primary | ICD-10-CM

## 2022-07-19 RX ORDER — SERTRALINE HYDROCHLORIDE 20 MG/ML
50 SOLUTION ORAL DAILY
Qty: 60 ML | Refills: 0 | Status: SHIPPED | OUTPATIENT
Start: 2022-07-19 | End: 2022-08-04

## 2022-07-19 NOTE — TELEPHONE ENCOUNTER
Spoke with pt about dosing  Discussed taper lexapro taper 10mg x's 5 days, 5 mg x's 5 days, then start new agent sertraline  No further action a this time

## 2022-07-20 ENCOUNTER — TELEPHONE (OUTPATIENT)
Dept: FAMILY MEDICINE CLINIC | Facility: CLINIC | Age: 27
End: 2022-07-20

## 2022-07-20 NOTE — TELEPHONE ENCOUNTER
----- Message from [facility]ellers [facility] sent at 7/20/2022  9:12 AM EDT -----  Regarding: FW: Recommendations    ----- Message -----  From: ALYSSA Reina  Sent: 7/19/2022   3:27 PM EDT  To: Sage Sellers LCSW, #  Subject: FW: Recommendations                              Can we see if she can make an appt with Zain at 3 pm tomorrow at [facility]?  ----- Message -----  From: Juancarlos Dwyer  Sent: 7/19/2022   3:22 PM EDT  To: Delilah Monica, #  Subject: RE: Recommendations                              I can see her tomorrow at 3:00 PM , if you hurry and schedule her  I will be at [facility]   ----- Message -----  From: Sage Sellers LCSW  Sent: 7/19/2022   2:41 PM EDT  To: ALYSSA Reina, Juancarlos Dwyer  Subject: RE: Recommendations                              Ah yes,  Didn't realize that's Nalini's sister! Let me see if Zain can take her     ----- Message -----  From: ALYSSA Reina  Sent: 7/19/2022   2:04 PM EDT  To: Sage Sellers LCSW  Subject: RE: Recommendations                              There is a conflict of interest I beleive, so I am unsure if you can see her too    ----- Message -----  From: Sage Sellers LCSW  Sent: 7/18/2022   8:40 AM EDT  To: ALYSSA Reina  Subject: RE: Recommendations                              Honestly,that's a tough one  I don't know anyone specifically  Would she be willing to see me? Not that I "specialize" in grief, do you know who she lost?    ----- Message -----  From: ALYSSA Reina  Sent: 7/15/2022   8:44 AM EDT  To: Sage Sellers LCSW  Subject: Recommendations                                  Mela Devi,    Do you have recommendations for a grief counselor that is accepting new patients?     Magy Pickett

## 2022-07-20 NOTE — TELEPHONE ENCOUNTER
Called patient to schedule appointment today at 2pm with Zain  She is unable to make it due to her work schedule  Says she will call back to schedule at different time

## 2022-08-04 ENCOUNTER — TELEMEDICINE (OUTPATIENT)
Dept: FAMILY MEDICINE CLINIC | Facility: CLINIC | Age: 27
End: 2022-08-04
Payer: COMMERCIAL

## 2022-08-04 VITALS — BODY MASS INDEX: 32.3 KG/M2 | WEIGHT: 201 LBS | HEIGHT: 66 IN

## 2022-08-04 DIAGNOSIS — F43.21 GRIEF REACTION: ICD-10-CM

## 2022-08-04 DIAGNOSIS — F43.23 ADJUSTMENT DISORDER WITH MIXED ANXIETY AND DEPRESSED MOOD: Primary | ICD-10-CM

## 2022-08-04 PROCEDURE — 3725F SCREEN DEPRESSION PERFORMED: CPT | Performed by: NURSE PRACTITIONER

## 2022-08-04 PROCEDURE — 99214 OFFICE O/P EST MOD 30 MIN: CPT | Performed by: NURSE PRACTITIONER

## 2022-08-04 RX ORDER — ESCITALOPRAM OXALATE 5 MG/5ML
20 SOLUTION ORAL DAILY
COMMUNITY
End: 2022-08-04 | Stop reason: SDUPTHER

## 2022-08-04 RX ORDER — ESCITALOPRAM OXALATE 5 MG/5ML
20 SOLUTION ORAL DAILY
Qty: 600 ML | Refills: 2 | Status: SHIPPED | OUTPATIENT
Start: 2022-08-04 | End: 2022-11-02

## 2022-08-04 NOTE — PROGRESS NOTES
Virtual Regular Visit    Verification of patient location:    Patient is located in the following state in which I hold an active license [place]      Assessment/Plan:    Problem List Items Addressed This Visit        Other    Grief reaction    Relevant Medications    escitalopram (LEXAPRO) 5 MG/5ML solution    Adjustment disorder with mixed anxiety and depressed mood - Primary    Relevant Medications    escitalopram (LEXAPRO) 5 MG/5ML solution        DC Sertraline and remain on lexapro 20 mg daily  Recheck in 3-4 months  Pt losing insurance for a job change  Reason for visit is   Chief Complaint   Patient presents with    Medication Management     Depression screen = 0     Virtual Regular Visit        Encounter provider ALYSSA Schultz    Provider located at [address] [place] [address]  [phone]      Recent Visits  No visits were found meeting these conditions  Showing recent visits within past 7 days and meeting all other requirements  Today's Visits  Date Type Provider Dept   08/04/22 Telemedicine Violet Reyes, [facility]   Showing today's visits and meeting all other requirements  Future Appointments  No visits were found meeting these conditions  Showing future appointments within next 150 days and meeting all other requirements       The patient was identified by name and date of birth  Nelson Santa was informed that this is a telemedicine visit and that the visit is being conducted through [address] [place] [address] and patient was informed that this is a secure, HIPAA-compliant platform  She agrees to proceed     My office door was closed  She acknowledged consent and understanding of privacy and security of the video platform  The patient has agreed to participate and understands they can discontinue the visit at any time  Patient is aware this is a billable service  Subjective  Nelson Santa is a 28 y o  female who is scheduled for a virtual visit to discuss anxiety and depression  Reports that she stopped the sertraline and started back on lexapro  Reports that she is feeling greatly improved  No new acute complaints at this time  Past Medical History:   Diagnosis Date    Asthma 2021    Wears glasses        Past Surgical History:   Procedure Laterality Date    FRACTURE SURGERY Right 2004    hip    HIP PINNING Right     Right hip pinning for fracture of growth plate at age 7       Current Outpatient Medications   Medication Sig Dispense Refill    albuterol (Ventolin HFA) 90 mcg/act inhaler Inhale 2 puffs every 4 (four) hours as needed for wheezing or shortness of breath 18 g 6    ALPRAZolam (XANAX) 0 25 mg tablet Take 1 tablet (0 25 mg total) by mouth daily at bedtime as needed for anxiety 15 tablet 0    escitalopram (LEXAPRO) 5 MG/5ML solution Take 20 mL (20 mg total) by mouth daily 600 mL 2    Loratadine (ALAVERT PO) Take 10 mg by mouth daily at bedtime       mometasone-formoterol (Dulera) 100-5 MCG/ACT inhaler Inhale 2 puffs 2 (two) times a day Rinse mouth after use  13 g 2     No current facility-administered medications for this visit  Allergies   Allergen Reactions    Penicillins Hives    Toradol [Ketorolac Tromethamine] Throat Swelling    Latex Rash    Singulair [Montelukast] Headache       Review of Systems   Constitutional: Negative for chills, fatigue and fever  Respiratory: Negative for cough, chest tightness and shortness of breath  Cardiovascular: Negative for chest pain, palpitations and leg swelling  Psychiatric/Behavioral: The patient is not nervous/anxious  Video Exam    Vitals:    08/04/22 1448   Weight: 91 2 kg (201 lb)   Height: 5' 6" (1 676 m)       Physical Exam  Vitals reviewed  Constitutional:       Appearance: Normal appearance  HENT:      Head: Normocephalic and atraumatic     Neurological:      Mental Status: She is alert and oriented to person, place, and time  Psychiatric:         Mood and Affect: Mood normal           I spent 20 minutes directly with the patient during this visit    VIRTUAL VISIT [address] [place] [address],[address] verbally agrees to participate in [employer]  Pt is aware that [employer] could be limited without vital signs or the ability to perform a full hands-on physical exam  Trina Siddiqui understands she or the provider may request at any time to terminate the video visit and request the patient to seek care or treatment in person

## 2022-09-22 ENCOUNTER — TELEPHONE (OUTPATIENT)
Dept: PSYCHIATRY | Facility: CLINIC | Age: 27
End: 2022-09-22

## 2022-11-03 ENCOUNTER — TELEMEDICINE (OUTPATIENT)
Dept: FAMILY MEDICINE CLINIC | Facility: CLINIC | Age: 27
End: 2022-11-03

## 2022-11-03 VITALS — HEIGHT: 66 IN | BODY MASS INDEX: 32.3 KG/M2 | WEIGHT: 201 LBS

## 2022-11-03 DIAGNOSIS — B00.1 HERPES LABIALIS: ICD-10-CM

## 2022-11-03 DIAGNOSIS — F43.23 ADJUSTMENT DISORDER WITH MIXED ANXIETY AND DEPRESSED MOOD: Primary | ICD-10-CM

## 2022-11-03 DIAGNOSIS — R43.9 SENSE OF SMELL ALTERED: ICD-10-CM

## 2022-11-03 RX ORDER — ACYCLOVIR 200 MG/5ML
400 SUSPENSION ORAL 3 TIMES DAILY
Qty: 473 ML | Refills: 0 | Status: SHIPPED | OUTPATIENT
Start: 2022-11-03 | End: 2022-11-13

## 2022-11-07 PROBLEM — R43.9 SENSE OF SMELL ALTERED: Status: ACTIVE | Noted: 2022-11-07

## 2022-11-07 NOTE — PROGRESS NOTES
Virtual Regular Visit    Verification of patient location:    Patient is located in the following state in which I hold an active license [place]      Assessment/Plan:    Problem List Items Addressed This Visit        Other    Adjustment disorder with mixed anxiety and depressed mood - Primary     Pt reports that she is feeling ok with change in her job  Reports she is no longer taking sertraline  Stable, no issues  Sense of smell altered     Pt reports that she does not have sense of taste since covid infection and smells constant body odor  Advise that she return to the office for further evaluation  Other Visit Diagnoses     Herpes labialis        Relevant Medications    acyclovir (ZOVIRAX) 200 mg/5 mL oral suspension               Reason for visit is   Chief Complaint   Patient presents with   • Medication Management     Depression score = 2    • Virtual Regular Visit        Encounter provider ALYSSA Funk    Provider located at [address] [place] [address]  [phone]      Recent Visits  Date Type Provider Dept   11/03/22 [address] [place] [address], [address] [address] Physicians   Showing recent visits within past 7 days and meeting all other requirements  Future Appointments  No visits were found meeting these conditions  Showing future appointments within next 150 days and meeting all other requirements       The patient was identified by name and date of birth  Terrance Almaguer was informed that this is a telemedicine visit and that the visit is being conducted through the [facility] Now platform  She agrees to proceed     My office door was closed  No one else was in the room  She acknowledged consent and understanding of privacy and security of the video platform  The patient has agreed to participate and understands they can discontinue the visit at any time  Patient is aware this is a billable service  Remedios Morse is a 28 y o  female who is scheduled for a virtual visit to discuss anxiety  Reports her symptoms have improved with change in employment  States she is no longer taking sertraline for management  Reports that she is having a strong smell of body odor that is causing her to shower 2-3 times per day  Reports that she is concerned that she smells of body odor despite good hygiene  She does report loss of taste s/p covid infection months ago  Reports blister on upper lip, cold sore  Past Medical History:   Diagnosis Date   • Asthma 2021   • Wears glasses        Past Surgical History:   Procedure Laterality Date   • FRACTURE SURGERY Right 2004    hip   • HIP PINNING Right     Right hip pinning for fracture of growth plate at age 7       Current Outpatient Medications   Medication Sig Dispense Refill   • acyclovir (ZOVIRAX) 200 mg/5 mL oral suspension Take 10 mL (400 mg total) by mouth 3 (three) times a day for 10 days 473 mL 0   • albuterol (Ventolin HFA) 90 mcg/act inhaler Inhale 2 puffs every 4 (four) hours as needed for wheezing or shortness of breath 18 g 6   • ALPRAZolam (XANAX) 0 25 mg tablet Take 1 tablet (0 25 mg total) by mouth daily at bedtime as needed for anxiety 15 tablet 0   • Loratadine (ALAVERT PO) Take 10 mg by mouth daily at bedtime      • mometasone-formoterol (Dulera) 100-5 MCG/ACT inhaler Inhale 2 puffs 2 (two) times a day Rinse mouth after use  13 g 2   • escitalopram (LEXAPRO) 5 MG/5ML solution Take 20 mL (20 mg total) by mouth daily 600 mL 2     No current facility-administered medications for this visit  Allergies   Allergen Reactions   • Penicillins Hives   • Toradol [Ketorolac Tromethamine] Throat Swelling   • Latex Rash   • Singulair [Montelukast] Headache       Review of Systems   Constitutional: Negative for chills, fatigue and fever  HENT: Negative for congestion  Respiratory: Negative for shortness of breath      Cardiovascular: Negative for chest pain  Skin:        Cold sore upper lip   Psychiatric/Behavioral: The patient is not nervous/anxious  Video Exam    Vitals:    11/03/22 1258   Weight: 91 2 kg (201 lb)   Height: 5' 6" (1 676 m)       Physical Exam  Vitals reviewed  Constitutional:       Appearance: Normal appearance  HENT:      Head: Normocephalic and atraumatic  Mouth/Throat:     Neurological:      Mental Status: She is alert and oriented to person, place, and time     Psychiatric:         Mood and Affect: Mood normal           I spent 20 minutes directly with the patient during this visit

## 2022-11-07 NOTE — ASSESSMENT & PLAN NOTE
Pt reports that she does not have sense of taste since covid infection and smells constant body odor  Advise that she return to the office for further evaluation 
Pt reports that she is feeling ok with change in her job  Reports she is no longer taking sertraline  Stable, no issues 
Yes - the patient is able to be screened

## 2022-11-08 ENCOUNTER — OFFICE VISIT (OUTPATIENT)
Dept: FAMILY MEDICINE CLINIC | Facility: CLINIC | Age: 27
End: 2022-11-08

## 2022-11-08 VITALS
DIASTOLIC BLOOD PRESSURE: 88 MMHG | BODY MASS INDEX: 33.43 KG/M2 | RESPIRATION RATE: 14 BRPM | HEART RATE: 94 BPM | WEIGHT: 208 LBS | HEIGHT: 66 IN | TEMPERATURE: 98 F | SYSTOLIC BLOOD PRESSURE: 128 MMHG | OXYGEN SATURATION: 99 %

## 2022-11-08 DIAGNOSIS — R43.9 SENSE OF SMELL ALTERED: Primary | ICD-10-CM

## 2022-11-08 DIAGNOSIS — L08.9 INFECTED EMBEDDED EARRING: ICD-10-CM

## 2022-11-08 DIAGNOSIS — G52.0 OLFACTORY NERVE DISORDER: ICD-10-CM

## 2022-11-08 DIAGNOSIS — J01.40 ACUTE NON-RECURRENT PANSINUSITIS: ICD-10-CM

## 2022-11-08 DIAGNOSIS — S00.459A INFECTED EMBEDDED EARRING: ICD-10-CM

## 2022-11-08 RX ORDER — PREDNISOLONE SODIUM PHOSPHATE 15 MG/5ML
20 SOLUTION ORAL 2 TIMES DAILY
Qty: 53.6 ML | Refills: 0 | Status: SHIPPED | OUTPATIENT
Start: 2022-11-08 | End: 2022-11-12

## 2022-11-08 RX ORDER — SULFAMETHOXAZOLE AND TRIMETHOPRIM 200; 40 MG/5ML; MG/5ML
20 SUSPENSION ORAL 2 TIMES DAILY
Qty: 280 ML | Refills: 0 | Status: SHIPPED | OUTPATIENT
Start: 2022-11-08 | End: 2022-11-15

## 2022-11-08 NOTE — PROGRESS NOTES
Assessment/Plan:    1  Sense of smell altered  Assessment & Plan:  S/p covid infection about one year ago  Consider consultation with ENT if no improvement  Orders:  -     prednisoLONE (ORAPRED) 15 mg/5 mL oral solution; Take 6 7 mL (20 mg total) by mouth 2 (two) times a day for 4 days    2  Olfactory nerve disorder  -     prednisoLONE (ORAPRED) 15 mg/5 mL oral solution; Take 6 7 mL (20 mg total) by mouth 2 (two) times a day for 4 days    3  Infected embedded earring  -     sulfamethoxazole-trimethoprim (BACTRIM) 200-40 mg/5 mL suspension; Take 20 mL (160 mg of trimethoprim total) by mouth 2 (two) times a day for 7 days    4  Acute non-recurrent pansinusitis  -     sulfamethoxazole-trimethoprim (BACTRIM) 200-40 mg/5 mL suspension; Take 20 mL (160 mg of trimethoprim total) by mouth 2 (two) times a day for 7 days    BMI Counseling: Body mass index is 33 57 kg/m²  The BMI is above normal  Nutrition recommendations include encouraging healthy choices of fruits and vegetables  Rationale for BMI follow-up plan is due to patient being overweight or obese  Return in about 1 week (around 11/15/2022) for Recheck  Subjective:      Patient ID: Marisa Lindsey is a 28 y o  female  Chief Complaint   Patient presents with   • Follow-up     Pt here for f/u per AG  Also has nasal congestion  Jesse Mealing is a 28year old female who presents to the office for evaluation and management of altered sense of smell  Pt reports that she is concerned that she constantly smells like body odor  Reports that no one has expressed to her  Reports that since having covid one year ago, she has not regained her sense of taste  Reports that there are some things that she chandler smell either  Over the past few months, she is sensing an odor and states that she feels warmth in BL axillary area  Denies fever, or chills  Reports congestion  Additionally, she feels that her left ear cartilage earring is infected   Reports tenderness and discharge  The following portions of the patient's history were reviewed and updated as appropriate: allergies, current medications, past family history, past medical history, past social history, past surgical history and problem list     Review of Systems      Current Outpatient Medications   Medication Sig Dispense Refill   • acyclovir (ZOVIRAX) 200 mg/5 mL oral suspension Take 10 mL (400 mg total) by mouth 3 (three) times a day for 10 days 473 mL 0   • albuterol (Ventolin HFA) 90 mcg/act inhaler Inhale 2 puffs every 4 (four) hours as needed for wheezing or shortness of breath 18 g 6   • ALPRAZolam (XANAX) 0 25 mg tablet Take 1 tablet (0 25 mg total) by mouth daily at bedtime as needed for anxiety 15 tablet 0   • Loratadine (ALAVERT PO) Take 10 mg by mouth daily at bedtime      • prednisoLONE (ORAPRED) 15 mg/5 mL oral solution Take 6 7 mL (20 mg total) by mouth 2 (two) times a day for 4 days 53 6 mL 0   • sulfamethoxazole-trimethoprim (BACTRIM) 200-40 mg/5 mL suspension Take 20 mL (160 mg of trimethoprim total) by mouth 2 (two) times a day for 7 days 280 mL 0     No current facility-administered medications for this visit  Objective:    /88   Pulse 94   Temp 98 °F (36 7 °C) (Temporal)   Resp 14   Ht 5' 6" (1 676 m)   Wt 94 3 kg (208 lb)   LMP 10/26/2022 (Approximate)   SpO2 99%   BMI 33 57 kg/m²        Physical Exam  Vitals reviewed  Constitutional:       General: She is not in acute distress  Appearance: Normal appearance  She is well-developed  She is not diaphoretic  HENT:      Head: Normocephalic and atraumatic  Right Ear: Tympanic membrane, ear canal and external ear normal       Left Ear: Tympanic membrane, ear canal and external ear normal       Nose: Mucosal edema present  No rhinorrhea  Mouth/Throat:      Pharynx: Oropharynx is clear  No posterior oropharyngeal erythema  Eyes:      General:         Right eye: No discharge  Left eye: No discharge  Conjunctiva/sclera: Conjunctivae normal    Neck:      Thyroid: No thyromegaly  Cardiovascular:      Rate and Rhythm: Normal rate and regular rhythm  Heart sounds: Normal heart sounds  Pulmonary:      Effort: Pulmonary effort is normal  No respiratory distress  Breath sounds: Normal breath sounds  No decreased breath sounds, wheezing, rhonchi or rales  Musculoskeletal:      Cervical back: Normal range of motion and neck supple  Lymphadenopathy:      Cervical: No cervical adenopathy  Skin:     General: Skin is warm and dry  Findings: No rash  Neurological:      Mental Status: She is alert and oriented to person, place, and time  Psychiatric:         Behavior: Behavior normal          Thought Content:  Thought content normal          Judgment: Judgment normal                 Gloria Hamper

## 2022-11-16 DIAGNOSIS — Z13.220 SCREENING FOR LIPID DISORDERS: ICD-10-CM

## 2022-11-16 DIAGNOSIS — Z11.59 ENCOUNTER FOR HEPATITIS C SCREENING TEST FOR LOW RISK PATIENT: ICD-10-CM

## 2022-11-16 DIAGNOSIS — Z00.00 ENCOUNTER FOR PHYSICAL EXAMINATION: Primary | ICD-10-CM

## 2022-11-16 DIAGNOSIS — Z13.6 SCREENING FOR HYPERTENSION: ICD-10-CM

## 2022-11-16 DIAGNOSIS — Z11.4 ENCOUNTER FOR SCREENING FOR HIV: ICD-10-CM

## 2022-11-16 DIAGNOSIS — Z13.29 SCREENING FOR THYROID DISORDER: ICD-10-CM

## 2022-11-17 ENCOUNTER — TELEMEDICINE (OUTPATIENT)
Dept: FAMILY MEDICINE CLINIC | Facility: CLINIC | Age: 27
End: 2022-11-17

## 2022-11-17 VITALS — WEIGHT: 208 LBS | HEIGHT: 66 IN | BODY MASS INDEX: 33.43 KG/M2

## 2022-11-17 DIAGNOSIS — R43.9 SENSE OF SMELL ALTERED: Primary | ICD-10-CM

## 2022-11-17 DIAGNOSIS — J30.89 NON-SEASONAL ALLERGIC RHINITIS, UNSPECIFIED TRIGGER: ICD-10-CM

## 2022-11-17 NOTE — PROGRESS NOTES
Virtual Regular Visit    Verification of patient location:    Patient is located in the following state in which I hold an active license [place]      Assessment/Plan:    Problem List Items Addressed This Visit        Respiratory    Non-seasonal allergic rhinitis     Pt reports she is feeling improved with use of antibiotic  Stable, no changes  Other    Sense of smell altered - Primary     Pt reports she continues to have issues with sense of smell and perception of foul odor s/p covid infection  Suspect nerve damage causing symptoms  Consider consult with ENT  Discussed with pt and advised watchful waiting for overall symptom improvement  Relevant Orders    Ambulatory Referral to Otolaryngology            Reason for visit is   Chief Complaint   Patient presents with   • Follow-up   • Virtual Regular Visit        Encounter provider ALYSSA Ortiz    Provider located at [address] [place] [address]  [phone]      Recent Visits  No visits were found meeting these conditions  Showing recent visits within past 7 days and meeting all other requirements  Future Appointments  No visits were found meeting these conditions  Showing future appointments within next 150 days and meeting all other requirements       The patient was identified by name and date of birth  Prosper Curiel was informed that this is a telemedicine visit and that the visit is being conducted through the [address] [place] [address] Road Now platform  She agrees to proceed     My office door was closed  No one else was in the room  She acknowledged consent and understanding of privacy and security of the video platform  The patient has agreed to participate and understands they can discontinue the visit at any time  Patient is aware this is a billable service       Gurwinder Ackerman is a 28year old female who is scheduled for a virtual recheck of sinusitis and altered sense of smell and perception of foul odor  Pt reports that her sinus symptoms have resolved, but she continues to have altered sense of smell and loss of smell  Reports she continues to have periodic sense of foul odor  Past Medical History:   Diagnosis Date   • Asthma 2021   • Wears glasses        Past Surgical History:   Procedure Laterality Date   • FRACTURE SURGERY Right 2004    hip   • HIP PINNING Right     Right hip pinning for fracture of growth plate at age 7       Current Outpatient Medications   Medication Sig Dispense Refill   • albuterol (Ventolin HFA) 90 mcg/act inhaler Inhale 2 puffs every 4 (four) hours as needed for wheezing or shortness of breath 18 g 6   • ALPRAZolam (XANAX) 0 25 mg tablet Take 1 tablet (0 25 mg total) by mouth daily at bedtime as needed for anxiety 15 tablet 0   • Loratadine (ALAVERT PO) Take 10 mg by mouth daily at bedtime      • acyclovir (ZOVIRAX) 200 mg/5 mL oral suspension Take 10 mL (400 mg total) by mouth 3 (three) times a day for 10 days 473 mL 0     No current facility-administered medications for this visit  Allergies   Allergen Reactions   • Bactrim [Sulfamethoxazole-Trimethoprim] Throat Swelling   • Penicillins Hives   • Toradol [Ketorolac Tromethamine] Throat Swelling   • Latex Rash   • Singulair [Montelukast] Headache       Review of Systems   Constitutional: Negative for chills and fever  HENT: Negative for ear discharge, ear pain, facial swelling, hearing loss, postnasal drip, rhinorrhea, sinus pressure and sinus pain  Video Exam    Vitals:    11/17/22 0907   Weight: 94 3 kg (208 lb)   Height: 5' 6" (1 676 m)       Physical Exam  Vitals reviewed  Constitutional:       Appearance: Normal appearance  HENT:      Head: Normocephalic and atraumatic  Neurological:      Mental Status: She is alert and oriented to person, place, and time     Psychiatric:         Mood and Affect: Mood normal           I spent 20 minutes directly with the patient during this visit

## 2022-11-29 NOTE — ASSESSMENT & PLAN NOTE
Pt reports she continues to have issues with sense of smell and perception of foul odor s/p covid infection  Suspect nerve damage causing symptoms  Consider consult with ENT  Discussed with pt and advised watchful waiting for overall symptom improvement

## 2022-12-19 ENCOUNTER — RA CDI HCC (OUTPATIENT)
Dept: OTHER | Facility: HOSPITAL | Age: 27
End: 2022-12-19

## 2022-12-19 NOTE — PROGRESS NOTES
[facility]  coding opportunities       Chart reviewed, no opportunity found: CHART REVIEWED, NO OPPORTUNITY FOUND        Patients Insurance        Commercial Insurance: [address] [place] [address]

## 2022-12-27 ENCOUNTER — TELEPHONE (OUTPATIENT)
Dept: FAMILY MEDICINE CLINIC | Facility: CLINIC | Age: 27
End: 2022-12-27

## 2022-12-27 DIAGNOSIS — Z11.59 ENCOUNTER FOR HEPATITIS C SCREENING TEST FOR LOW RISK PATIENT: ICD-10-CM

## 2022-12-27 DIAGNOSIS — Z11.4 ENCOUNTER FOR SCREENING FOR HIV: ICD-10-CM

## 2022-12-27 DIAGNOSIS — Z13.6 SCREENING FOR HYPERTENSION: ICD-10-CM

## 2022-12-27 DIAGNOSIS — Z13.29 SCREENING FOR THYROID DISORDER: ICD-10-CM

## 2022-12-27 DIAGNOSIS — Z13.220 SCREENING FOR LIPID DISORDERS: ICD-10-CM

## 2022-12-27 DIAGNOSIS — Z00.00 ENCOUNTER FOR PHYSICAL EXAMINATION: Primary | ICD-10-CM

## 2022-12-27 NOTE — TELEPHONE ENCOUNTER
Keeley Pardon called and had to change her cpx from this Friday to another date    She stated that her BW order says that it  on 22    Does she need new orders for her next appt next year

## 2022-12-29 ENCOUNTER — OFFICE VISIT (OUTPATIENT)
Dept: FAMILY MEDICINE CLINIC | Facility: CLINIC | Age: 27
End: 2022-12-29

## 2022-12-29 VITALS
RESPIRATION RATE: 12 BRPM | OXYGEN SATURATION: 99 % | TEMPERATURE: 97.6 F | BODY MASS INDEX: 33.91 KG/M2 | DIASTOLIC BLOOD PRESSURE: 88 MMHG | WEIGHT: 211 LBS | HEART RATE: 94 BPM | HEIGHT: 66 IN | SYSTOLIC BLOOD PRESSURE: 122 MMHG

## 2022-12-29 DIAGNOSIS — J01.40 ACUTE NON-RECURRENT PANSINUSITIS: Primary | ICD-10-CM

## 2022-12-29 RX ORDER — AZITHROMYCIN 200 MG/5ML
POWDER, FOR SUSPENSION ORAL
Qty: 37.7 ML | Refills: 0 | Status: SHIPPED | OUTPATIENT
Start: 2022-12-29 | End: 2023-01-03

## 2022-12-29 NOTE — LETTER
December 29, 2022     Patient: Nasim Fang  YOB: 1995  Date of Visit: 12/29/2022      To Whom it May Concern:    David Hidalgo is under my professional care  Meryl Etienne was seen in my office on 12/29/2022  Meryl Etienne may return to work on 1/3/22  If you have any questions or concerns, please don't hesitate to call           Sincerely,          Sushila Driver

## 2022-12-29 NOTE — PROGRESS NOTES
Assessment/Plan:    1  Acute non-recurrent pansinusitis  -     azithromycin (ZITHROMAX) 200 mg/5 mL suspension; Take 12 5 mL (500 mg total) by mouth daily for 1 day, THEN 6 3 mL (250 mg total) daily for 4 days  Patient Instructions   Increase fluid intake as tolerated  Rest and humidification   Continue medications as directed   - antibiotic for full course  - pro-biotic to protect stomach while on medication   - Flonase OTC 1-2 sprays each nostril daily PRN post nasal drip   - Mucinex OTC to loosen secretions   Return to office in one week if symptoms persist or worsen        Return if symptoms worsen or fail to improve  Subjective:      Patient ID: Alexa Espinoza is a 28 y o  female  Chief Complaint   Patient presents with   • Cold Like Symptoms     Pt here for sore throat in the am, nasal congestion, on and off headache, fatigue for approximately one week       Sinusitis  This is a new problem  The current episode started in the past 7 days  The problem is unchanged  There has been no fever  She is experiencing no pain  Associated symptoms include congestion, sinus pressure and a sore throat  Pertinent negatives include no chills, coughing, ear pain, headaches, hoarse voice, neck pain or shortness of breath  Past treatments include nothing  The treatment provided no relief  The following portions of the patient's history were reviewed and updated as appropriate: allergies, current medications, past family history, past medical history, past social history, past surgical history and problem list     Review of Systems   Constitutional: Positive for fatigue  Negative for chills and fever  HENT: Positive for congestion, postnasal drip, sinus pressure and sore throat  Negative for ear pain, hoarse voice and sinus pain  Respiratory: Negative for cough and shortness of breath  Cardiovascular: Negative for chest pain  Gastrointestinal: Negative for nausea and vomiting     Musculoskeletal: Negative for neck pain  Neurological: Negative for headaches  Current Outpatient Medications   Medication Sig Dispense Refill   • acyclovir (ZOVIRAX) 200 mg/5 mL oral suspension Take 10 mL (400 mg total) by mouth 3 (three) times a day for 10 days 473 mL 0   • albuterol (Ventolin HFA) 90 mcg/act inhaler Inhale 2 puffs every 4 (four) hours as needed for wheezing or shortness of breath 18 g 6   • ALPRAZolam (XANAX) 0 25 mg tablet Take 1 tablet (0 25 mg total) by mouth daily at bedtime as needed for anxiety 15 tablet 0   • azithromycin (ZITHROMAX) 200 mg/5 mL suspension Take 12 5 mL (500 mg total) by mouth daily for 1 day, THEN 6 3 mL (250 mg total) daily for 4 days  37 7 mL 0   • Loratadine (ALAVERT PO) Take 10 mg by mouth daily at bedtime        No current facility-administered medications for this visit  Objective:    /88   Pulse 94   Temp 97 6 °F (36 4 °C) (Temporal)   Resp 12   Ht 5' 6" (1 676 m)   Wt 95 7 kg (211 lb)   SpO2 99%   BMI 34 06 kg/m²        Physical Exam  Vitals reviewed  Constitutional:       General: She is not in acute distress  Appearance: She is well-developed  She is not diaphoretic  HENT:      Head: Normocephalic and atraumatic  Right Ear: Tympanic membrane, ear canal and external ear normal  No drainage, swelling or tenderness  No middle ear effusion  Left Ear: Ear canal and external ear normal  No drainage, swelling or tenderness  A middle ear effusion is present  Nose: Mucosal edema and rhinorrhea present  Rhinorrhea is clear  Right Sinus: No maxillary sinus tenderness or frontal sinus tenderness  Left Sinus: No maxillary sinus tenderness or frontal sinus tenderness  Mouth/Throat:      Pharynx: Uvula midline  Posterior oropharyngeal erythema present  No oropharyngeal exudate  Eyes:      General:         Right eye: No discharge  Left eye: No discharge        Conjunctiva/sclera: Conjunctivae normal    Neck: Thyroid: No thyromegaly  Cardiovascular:      Rate and Rhythm: Normal rate and regular rhythm  Heart sounds: Normal heart sounds  Pulmonary:      Effort: Pulmonary effort is normal  No respiratory distress  Breath sounds: Normal breath sounds  No decreased breath sounds, wheezing, rhonchi or rales  Musculoskeletal:      Cervical back: Normal range of motion and neck supple  Lymphadenopathy:      Cervical: No cervical adenopathy  Skin:     General: Skin is warm and dry  Findings: No rash  Neurological:      Mental Status: She is alert and oriented to person, place, and time  Psychiatric:         Behavior: Behavior normal          Thought Content:  Thought content normal                 ALYSSA Knight

## 2023-01-27 ENCOUNTER — TELEMEDICINE (OUTPATIENT)
Dept: FAMILY MEDICINE CLINIC | Facility: CLINIC | Age: 28
End: 2023-01-27

## 2023-01-27 VITALS — WEIGHT: 211 LBS | HEIGHT: 66 IN | BODY MASS INDEX: 33.91 KG/M2

## 2023-01-27 DIAGNOSIS — F43.23 ADJUSTMENT DISORDER WITH MIXED ANXIETY AND DEPRESSED MOOD: Primary | ICD-10-CM

## 2023-01-27 DIAGNOSIS — F41.9 ANXIETY: ICD-10-CM

## 2023-01-27 RX ORDER — PAROXETINE 10 MG/5ML
10 SUSPENSION ORAL EVERY MORNING
Qty: 250 ML | Refills: 0 | Status: SHIPPED | OUTPATIENT
Start: 2023-01-27

## 2023-01-27 NOTE — PROGRESS NOTES
Virtual Regular Visit    Verification of patient location:    Patient is located in the following state in which I hold an active license [place]      Assessment/Plan:    Problem List Items Addressed This Visit        Other    Adjustment disorder with mixed anxiety and depressed mood - Primary    Relevant Medications    PARoxetine (PAXIL) 10 mg/5 mL suspension    Other Relevant Orders    Ambulatory Referral to Gris Schwarz   Other Visit Diagnoses     Anxiety        Relevant Medications    PARoxetine (PAXIL) 10 mg/5 mL suspension    Other Relevant Orders    Ambulatory Referral to [address] [place] [address] off of lexapro   - Reduce down from 20 ml to 10 ml daily x's 7 days   - Reduce further to 5 ml daily x's 3 days  Start paxil at the end of lexapro taper  Reason for visit is   Chief Complaint   Patient presents with   • Virtual Regular Visit        Encounter provider ALYSSA Dwyer    Provider located at [facility]  [address] [facility] [id number]      Recent Visits  No visits were found meeting these conditions  Showing recent visits within past 7 days and meeting all other requirements  Today's Visits  Date Type Provider Dept   01/27/23 Telemedicine ALYSSA Dwyer Pg   Showing today's visits and meeting all other requirements  Future Appointments  No visits were found meeting these conditions  Showing future appointments within next 150 days and meeting all other requirements       The patient was identified by name and date of birth  Yohan Nobles was informed that this is a telemedicine visit and that the visit is being conducted through the [address] [facility] [address] Now platform  She agrees to proceed     My office door was closed  No one else was in the room  She acknowledged consent and understanding of privacy and security of the video platform   The patient has agreed to participate and understands they can discontinue the visit at any time  Patient is aware this is a billable service  Chichi Ceballos is a 28year old female who is scheduled for a virtual visit to discuss medications  Reports that she has been taking lexapro 20 mg daily, but has been having increasing symptoms of chest and throat tightness  Pt reports that she has some days when she feels ok, but other days when she does feel anxious  Denies chest pain or shortness of breath  Past Medical History:   Diagnosis Date   • Asthma 2021   • Wears glasses        Past Surgical History:   Procedure Laterality Date   • FRACTURE SURGERY Right 2004    hip   • HIP PINNING Right     Right hip pinning for fracture of growth plate at age 7       Current Outpatient Medications   Medication Sig Dispense Refill   • albuterol (Ventolin HFA) 90 mcg/act inhaler Inhale 2 puffs every 4 (four) hours as needed for wheezing or shortness of breath 18 g 6   • ALPRAZolam (XANAX) 0 25 mg tablet Take 1 tablet (0 25 mg total) by mouth daily at bedtime as needed for anxiety 15 tablet 0   • Loratadine (ALAVERT PO) Take 10 mg by mouth daily at bedtime      • PARoxetine (PAXIL) 10 mg/5 mL suspension Take 5 mL (10 mg total) by mouth every morning 250 mL 0   • acyclovir (ZOVIRAX) 200 mg/5 mL oral suspension Take 10 mL (400 mg total) by mouth 3 (three) times a day for 10 days 473 mL 0     No current facility-administered medications for this visit  Allergies   Allergen Reactions   • Bactrim [Sulfamethoxazole-Trimethoprim] Throat Swelling   • Penicillins Hives   • Toradol [Ketorolac Tromethamine] Throat Swelling   • Latex Rash   • Singulair [Montelukast] Headache       Review of Systems   Constitutional: Negative for chills, fatigue and fever  HENT: Negative for sore throat (intermittent throat tightness)  Respiratory: Positive for chest tightness  Negative for cough and shortness of breath  Cardiovascular: Negative for chest pain     Psychiatric/Behavioral: The patient is nervous/anxious  Video Exam    Vitals:    01/27/23 1131   Weight: 95 7 kg (211 lb)   Height: 5' 6" (1 676 m)       Physical Exam  Vitals reviewed  Constitutional:       Appearance: Normal appearance  HENT:      Head: Normocephalic and atraumatic  Neurological:      Mental Status: She is alert and oriented to person, place, and time     Psychiatric:         Mood and Affect: Mood normal           I spent 20 minutes directly with the patient during this visit

## 2023-01-27 NOTE — PATIENT INSTRUCTIONS
Taper off of lexapro   - Reduce down from 20 ml to 10 ml daily x's 7 days   - Reduce further to 5 ml daily x's 3 days  Start paxil at the end of lexapro taper

## 2023-03-14 LAB
ALBUMIN SERPL-MCNC: 4.2 G/DL (ref 3.9–5)
ALBUMIN/GLOB SERPL: 1.8 {RATIO} (ref 1.2–2.2)
ALP SERPL-CCNC: 107 IU/L (ref 44–121)
ALT SERPL-CCNC: 23 IU/L (ref 0–32)
AST SERPL-CCNC: 21 IU/L (ref 0–40)
BASOPHILS # BLD AUTO: 0.1 X10E3/UL (ref 0–0.2)
BASOPHILS NFR BLD AUTO: 1 %
BILIRUB SERPL-MCNC: 0.2 MG/DL (ref 0–1.2)
BUN SERPL-MCNC: 11 MG/DL (ref 6–20)
BUN/CREAT SERPL: 14 (ref 9–23)
CALCIUM SERPL-MCNC: 8.9 MG/DL (ref 8.7–10.2)
CHLORIDE SERPL-SCNC: 101 MMOL/L (ref 96–106)
CHOLEST SERPL-MCNC: 146 MG/DL (ref 100–199)
CHOLEST/HDLC SERPL: 2.4 RATIO (ref 0–4.4)
CO2 SERPL-SCNC: 21 MMOL/L (ref 20–29)
CREAT SERPL-MCNC: 0.78 MG/DL (ref 0.57–1)
EGFR: 105 ML/MIN/1.73
EOSINOPHIL # BLD AUTO: 0.2 X10E3/UL (ref 0–0.4)
EOSINOPHIL NFR BLD AUTO: 3 %
ERYTHROCYTE [DISTWIDTH] IN BLOOD BY AUTOMATED COUNT: 12.4 % (ref 11.7–15.4)
GLOBULIN SER-MCNC: 2.4 G/DL (ref 1.5–4.5)
GLUCOSE SERPL-MCNC: 80 MG/DL (ref 70–99)
HCT VFR BLD AUTO: 42 % (ref 34–46.6)
HCV AB S/CO SERPL IA: NON REACTIVE
HDLC SERPL-MCNC: 62 MG/DL
HGB BLD-MCNC: 13.7 G/DL (ref 11.1–15.9)
HIV 1+2 AB+HIV1 P24 AG SERPL QL IA: NON REACTIVE
IMM GRANULOCYTES # BLD: 0 X10E3/UL (ref 0–0.1)
IMM GRANULOCYTES NFR BLD: 0 %
LDLC SERPL CALC-MCNC: 70 MG/DL (ref 0–99)
LYMPHOCYTES # BLD AUTO: 1.8 X10E3/UL (ref 0.7–3.1)
LYMPHOCYTES NFR BLD AUTO: 34 %
MCH RBC QN AUTO: 28.1 PG (ref 26.6–33)
MCHC RBC AUTO-ENTMCNC: 32.6 G/DL (ref 31.5–35.7)
MCV RBC AUTO: 86 FL (ref 79–97)
MONOCYTES # BLD AUTO: 0.6 X10E3/UL (ref 0.1–0.9)
MONOCYTES NFR BLD AUTO: 11 %
NEUTROPHILS # BLD AUTO: 2.7 X10E3/UL (ref 1.4–7)
NEUTROPHILS NFR BLD AUTO: 51 %
PLATELET # BLD AUTO: 345 X10E3/UL (ref 150–450)
POTASSIUM SERPL-SCNC: 4 MMOL/L (ref 3.5–5.2)
PROT SERPL-MCNC: 6.6 G/DL (ref 6–8.5)
RBC # BLD AUTO: 4.88 X10E6/UL (ref 3.77–5.28)
SL AMB VLDL CHOLESTEROL CALC: 14 MG/DL (ref 5–40)
SODIUM SERPL-SCNC: 138 MMOL/L (ref 134–144)
TRIGL SERPL-MCNC: 71 MG/DL (ref 0–149)
TSH SERPL DL<=0.005 MIU/L-ACNC: 0.03 UIU/ML (ref 0.45–4.5)
WBC # BLD AUTO: 5.4 X10E3/UL (ref 3.4–10.8)

## 2023-03-21 ENCOUNTER — HOSPITAL ENCOUNTER (OUTPATIENT)
Dept: RADIOLOGY | Facility: HOSPITAL | Age: 28
Discharge: HOME | End: 2023-03-21

## 2023-03-21 ENCOUNTER — OFFICE VISIT (OUTPATIENT)
Dept: FAMILY MEDICINE CLINIC | Facility: CLINIC | Age: 28
End: 2023-03-21

## 2023-03-21 VITALS
HEART RATE: 75 BPM | WEIGHT: 211 LBS | OXYGEN SATURATION: 99 % | BODY MASS INDEX: 33.91 KG/M2 | SYSTOLIC BLOOD PRESSURE: 108 MMHG | RESPIRATION RATE: 12 BRPM | HEIGHT: 66 IN | DIASTOLIC BLOOD PRESSURE: 78 MMHG | TEMPERATURE: 98.8 F

## 2023-03-21 DIAGNOSIS — E05.90 HYPERTHYROIDISM: ICD-10-CM

## 2023-03-21 DIAGNOSIS — F43.23 ADJUSTMENT DISORDER WITH MIXED ANXIETY AND DEPRESSED MOOD: ICD-10-CM

## 2023-03-21 DIAGNOSIS — Z23 NEED FOR PNEUMOCOCCAL VACCINATION: ICD-10-CM

## 2023-03-21 DIAGNOSIS — Z00.00 ENCOUNTER FOR ANNUAL GENERAL MEDICAL EXAMINATION WITHOUT ABNORMAL FINDINGS IN ADULT: Primary | ICD-10-CM

## 2023-03-21 NOTE — PATIENT INSTRUCTIONS
Recent Results (from the past 672 hour(s))   CBC and differential    Collection Time: 03/13/23  9:35 AM   Result Value Ref Range    White Blood Cell Count 5 4 3 4 - 10 8 x10E3/uL    Red Blood Cell Count 4 88 3 77 - 5 28 x10E6/uL    Hemoglobin 13 7 11 1 - 15 9 g/dL    HCT 42 0 34 0 - 46 6 %    MCV 86 79 - 97 fL    MCH 28 1 26 6 - 33 0 pg    MCHC 32 6 31 5 - 35 7 g/dL    RDW 12 4 11 7 - 15 4 %    Platelet Count 179 700 - 450 x10E3/uL    Neutrophils 51 Not Estab  %    Lymphocytes 34 Not Estab  %    Monocytes 11 Not Estab  %    Eosinophils 3 Not Estab  %    Basophils PCT 1 Not Estab  %    Neutrophils (Absolute) 2 7 1 4 - 7 0 x10E3/uL    Lymphocytes (Absolute) 1 8 0 7 - 3 1 x10E3/uL    Monocytes (Absolute) 0 6 0 1 - 0 9 x10E3/uL    Eosinophils (Absolute) 0 2 0 0 - 0 4 x10E3/uL    Basophils ABS 0 1 0 0 - 0 2 x10E3/uL    Immature Granulocytes 0 Not Estab  %    Immature Granulocytes (Absolute) 0 0 0 0 - 0 1 x10E3/uL   Comprehensive metabolic panel    Collection Time: 03/13/23  9:35 AM   Result Value Ref Range    Glucose, Random 80 70 - 99 mg/dL    BUN 11 6 - 20 mg/dL    Creatinine 0 78 0 57 - 1 00 mg/dL    eGFR 105 >59 mL/min/1 73    SL AMB BUN/CREATININE RATIO 14 9 - 23    Sodium 138 134 - 144 mmol/L    Potassium 4 0 3 5 - 5 2 mmol/L    Chloride 101 96 - 106 mmol/L    CO2 21 20 - 29 mmol/L    CALCIUM 8 9 8 7 - 10 2 mg/dL    Protein, Total 6 6 6 0 - 8 5 g/dL    Albumin 4 2 3 9 - 5 0 g/dL    Globulin, Total 2 4 1 5 - 4 5 g/dL    Albumin/Globulin Ratio 1 8 1 2 - 2 2    TOTAL BILIRUBIN 0 2 0 0 - 1 2 mg/dL    Alk Phos Isoenzymes 107 44 - 121 IU/L    AST 21 0 - 40 IU/L    ALT 23 0 - 32 IU/L   Lipid panel    Collection Time: 03/13/23  9:35 AM   Result Value Ref Range    Cholesterol, Total 146 100 - 199 mg/dL    Triglycerides 71 0 - 149 mg/dL    HDL 62 >39 mg/dL    VLDL Cholesterol Calculated 14 5 - 40 mg/dL    LDL Calculated 70 0 - 99 mg/dL    T   Chol/HDL Ratio 2 4 0 0 - 4 4 ratio   TSH, 3rd generation    Collection Time: 03/13/23  9:35 AM   Result Value Ref Range    TSH 0 034 (L) 0 450 - 4 500 uIU/mL   Hepatitis C antibody    Collection Time: 03/13/23  9:35 AM   Result Value Ref Range    HEP C AB Non Reactive Non Reactive   Human Immunodeficiency Virus 1/2 Antigen / Antibody ( Fourth Generation) with Reflex Testing    Collection Time: 03/13/23  9:35 AM   Result Value Ref Range    HIV Screen 4th Generation wRflx Non Reactive Non Reactive

## 2023-03-21 NOTE — PROGRESS NOTES
FAMILY [facility] HEALTH MAINTENANCE OFFICE VISIT  [facility] - [facility] PHYSICIANS    NAME: Zaida Serra  AGE: 33 y o  SEX: female  : 1995     DATE: 3/21/2023    Assessment and Plan     1  Encounter for annual general medical examination without abnormal findings in adult  Comments:  Age appropriate screenings and recommendations  Fasting labs reviewed  2  Adjustment disorder with mixed anxiety and depressed mood  Assessment & Plan:  Taking paxil 10 mg daily and this is working well for her  Stable, no changes  3  Hyperthyroidism  -     TSH, 3rd generation  -     T4, free  -     Thyroid Antibodies Panel  -     US thyroid; Future; Expected date: 2023  -     Ambulatory Referral to Endocrinology; Future  -     T3, free    4  Need for pneumococcal vaccination  -     Pneumococcal Conjugate Vaccine 20-valent (Pcv20)      · Patient Counseling:   · Nutrition: Stressed importance of a well balanced diet, moderation of sodium/saturated fat, caloric balance and sufficient intake of fiber  · Exercise: Stressed the importance of regular exercise with a goal of 150 minutes per week  · Dental Health: Discussed daily flossing and brushing and regular dental visits   · Sexuality: Discussed sexually transmitted infections, use of condoms and prevention of unintended pregnancy  · Alcohol Use:  Recommended moderation of alcohol intake  · Injury Prevention: Discussed Safety Belts, Safety Helmets, and Smoke Detectors    · Immunizations reviewed: See Orders and Risks and Benefits discussed  · Discussed benefits of:  Cervical Cancer screening and Screening labs  • BMI Counseling: Body mass index is 34 06 kg/m²  Discussed with patient's BMI with her   The BMI is above normal  Nutrition recommendations include reducing portion sizes, decreasing overall calorie intake, 3-5 servings of fruits/vegetables daily, reducing fast food intake, consuming healthier snacks, decreasing soda and/or juice intake, moderation in carbohydrate intake, increasing intake of lean protein, reducing intake of saturated fat and trans fat and reducing intake of cholesterol  Exercise recommendations include moderate aerobic physical activity for 150 minutes/week and exercising 3-5 times per week  Return in about 3 months (around 6/21/2023) for Recheck - thyroid follow up  Chief Complaint     Chief Complaint   Patient presents with   • Annual Exam       History of Present Illness     HPI    Well Adult Physical   Patient here for a comprehensive physical exam       Diet and Physical Activity  Diet: well balanced diet  Exercise: occasionally      Depression Screen  PHQ-2/9 Depression Screening            General Health  Hearing: Normal:  bilateral  Vision: goes for regular eye exams  Dental: regular dental visits    Reproductive Health  No issues  and Regular Periods      The following portions of the patient's history were reviewed and updated as appropriate: allergies, current medications, past family history, past medical history, past social history, past surgical history and problem list     Review of Systems     Review of Systems   Constitutional: Positive for diaphoresis  Negative for fatigue and fever  HENT: Negative for ear pain and hearing loss  Eyes: Negative for pain and visual disturbance  Respiratory: Negative for chest tightness and shortness of breath  Cardiovascular: Negative for chest pain, palpitations and leg swelling  Gastrointestinal: Negative for abdominal pain, constipation and diarrhea  Genitourinary: Negative for difficulty urinating  Musculoskeletal: Negative for arthralgias and myalgias  Skin: Negative for rash  Neurological: Negative for dizziness, numbness and headaches  Psychiatric/Behavioral: Negative for sleep disturbance         Past Medical History     Past Medical History:   Diagnosis Date   • Asthma 2021   • Wears glasses        Past Surgical History Past Surgical History:   Procedure Laterality Date   • FRACTURE SURGERY Right 2004    hip   • HIP PINNING Right     Right hip pinning for fracture of growth plate at age 7       Social History     Social History     Socioeconomic History   • Marital status: Single     Spouse name: None   • Number of children: None   • Years of education: None   • Highest education level: None   Occupational History   • None   Tobacco Use   • Smoking status: Never   • Smokeless tobacco: Never   Vaping Use   • Vaping Use: Never used   Substance and Sexual Activity   • Alcohol use: No   • Drug use: No   • Sexual activity: Not Currently     Partners: Male     Birth control/protection: None   Other Topics Concern   • None   Social History Narrative        Who lives in your home: sister, brother in law    What type of home do you live in: single house    Age of your home: unknown    How long have you been living there: 4 yrs    Type of heat: forced hot air    Type of fuel: electric    What type of melanie is in your bedroom: tile    Do you have the following in or near your home:    Air products: central air, air filter, air , air purifier, de humidifier    Pests: no    Pets: {3 dogs, 2 cats    Are pets allowed in bedroom yes    Open fields, wooded areas nearby: wooded area    Basement:damp unfinished    Exposure to second hand smoke: no        Habits:    Caffeine:1 cup daily    Chocolate: once a month    Other:         Social Determinants of Health     Financial Resource Strain: Not on file   Food Insecurity: Not on file   Transportation Needs: Not on file   Physical Activity: Not on file   Stress: Not on file   Social Connections: Not on file   Intimate Partner Violence: Not on file   Housing Stability: Not on file       Family History     Family History   Problem Relation Age of Onset   • Cancer Mother         Passed away 6-12-21   • Cervical cancer Mother    • Diabetes Father    • Asthma Sister    • No Known Problems Sister    • Ovarian cancer Maternal Aunt        Current Medications       Current Outpatient Medications:   •  albuterol (Ventolin HFA) 90 mcg/act inhaler, Inhale 2 puffs every 4 (four) hours as needed for wheezing or shortness of breath, Disp: 18 g, Rfl: 6  •  ALPRAZolam (XANAX) 0 25 mg tablet, Take 1 tablet (0 25 mg total) by mouth daily at bedtime as needed for anxiety, Disp: 15 tablet, Rfl: 0  •  Loratadine (ALAVERT PO), Take 10 mg by mouth daily at bedtime , Disp: , Rfl:   •  PARoxetine (PAXIL) 10 mg/5 mL suspension, Take 5 mL (10 mg total) by mouth every morning, Disp: 250 mL, Rfl: 0     Allergies     Allergies   Allergen Reactions   • Bactrim [Sulfamethoxazole-Trimethoprim] Throat Swelling   • Penicillins Hives   • Toradol [Ketorolac Tromethamine] Throat Swelling   • Latex Rash   • Singulair [Montelukast] Headache       Objective     /78 (BP Location: Left arm, Patient Position: Sitting, Cuff Size: Large)   Pulse 75   Temp 98 8 °F (37 1 °C) (Temporal)   Resp 12   Ht 5' 6" (1 676 m)   Wt 95 7 kg (211 lb)   LMP 02/01/2023 (Exact Date)   SpO2 99%   BMI 34 06 kg/m²      Physical Exam  Vitals reviewed  Constitutional:       General: She is not in acute distress  Appearance: Normal appearance  She is well-developed  She is not diaphoretic  HENT:      Head: Normocephalic and atraumatic  Right Ear: Tympanic membrane, ear canal and external ear normal       Left Ear: Tympanic membrane, ear canal and external ear normal    Eyes:      General: Lids are normal       Extraocular Movements: Extraocular movements intact  Conjunctiva/sclera: Conjunctivae normal       Pupils: Pupils are equal, round, and reactive to light  Funduscopic exam:     Right eye: No hemorrhage or exudate  Red reflex present  Left eye: No hemorrhage or exudate  Red reflex present  Neck:      Thyroid: No thyroid mass or thyromegaly  Vascular: No carotid bruit     Cardiovascular:      Rate and Rhythm: Normal rate and regular rhythm  Pulses: Normal pulses  Heart sounds: Normal heart sounds, S1 normal and S2 normal  No murmur heard  Pulmonary:      Effort: Pulmonary effort is normal       Breath sounds: Normal breath sounds  No decreased breath sounds, wheezing, rhonchi or rales  Abdominal:      General: Bowel sounds are normal  There is no distension  Palpations: Abdomen is soft  Tenderness: There is no abdominal tenderness  Musculoskeletal:         General: Normal range of motion  Cervical back: Full passive range of motion without pain, normal range of motion and neck supple  Right lower leg: No edema  Left lower leg: No edema  Lymphadenopathy:      Cervical: No cervical adenopathy  Upper Body:      Right upper body: No supraclavicular adenopathy  Left upper body: No supraclavicular adenopathy  Skin:     General: Skin is warm and dry  Findings: No rash  Neurological:      General: No focal deficit present  Mental Status: She is alert and oriented to person, place, and time  Cranial Nerves: No cranial nerve deficit  Sensory: No sensory deficit  Motor: Motor function is intact  Coordination: Coordination normal       Deep Tendon Reflexes: Reflexes are normal and symmetric  Psychiatric:         Speech: Speech normal          Behavior: Behavior normal          Thought Content:  Thought content normal          Judgment: Judgment normal                Beni Redman, [address] [facility]

## 2023-03-22 ENCOUNTER — TELEPHONE (OUTPATIENT)
Dept: FAMILY MEDICINE CLINIC | Facility: CLINIC | Age: 28
End: 2023-03-22

## 2023-03-22 LAB
T3FREE SERPL-MCNC: 6.4 PG/ML (ref 2–4.4)
T4 FREE SERPL-MCNC: 2.3 NG/DL (ref 0.82–1.77)
THYROGLOB AB SERPL-ACNC: 205.2 IU/ML (ref 0–0.9)
THYROPEROXIDASE AB SERPL-ACNC: <9 IU/ML (ref 0–34)
TSH SERPL DL<=0.005 MIU/L-ACNC: 0.02 UIU/ML (ref 0.45–4.5)

## 2023-03-22 NOTE — TELEPHONE ENCOUNTER
Informed Cayden's response  [id number] called her to schedule  She is scheduled for 3/24/23  Chris Covarrubias says thanks for your help    No further action

## 2023-03-22 NOTE — TELEPHONE ENCOUNTER
I called and left a message for Harjinder Murry in [place] for either clinical staff or provider to give us a call back to see if they can schedule to see her  If they are unable to schedule within the next week or so, can I speak to one of their providers in regards to first steps for her? I know they are very busy, so I understand either way  Thanks!

## 2023-03-22 NOTE — TELEPHONE ENCOUNTER
Hal states the endo you referred her to yesterday is not calling her back  Is there someone else you can refer her to? Also saw her bw results in [employer] [facility] and is concerned      [phone]

## 2023-03-24 ENCOUNTER — TELEPHONE (OUTPATIENT)
Dept: FAMILY MEDICINE CLINIC | Facility: CLINIC | Age: 28
End: 2023-03-24

## 2023-03-24 DIAGNOSIS — E05.90 HYPERTHYROIDISM: Primary | ICD-10-CM

## 2023-03-24 RX ORDER — PROPYLTHIOURACIL 50 MG/1
50 TABLET ORAL 3 TIMES DAILY
Qty: 90 TABLET | Refills: 1 | Status: SHIPPED | OUTPATIENT
Start: 2023-03-24

## 2023-03-24 RX ORDER — METHIMAZOLE 5 MG/1
5 TABLET ORAL 3 TIMES DAILY
Qty: 90 TABLET | Refills: 0 | Status: SHIPPED | OUTPATIENT
Start: 2023-03-24 | End: 2023-03-24

## 2023-03-24 NOTE — TELEPHONE ENCOUNTER
Spoke to Hal  She is in agreement with Fredis Neighbours  Please send to SR in [place]  She wanted to know if th medication comes in liquid form, if it does not, can she crush the medication?     Brandt Ortega is unable to scheduled this test - I gave her central scheduling phone number

## 2023-03-24 NOTE — TELEPHONE ENCOUNTER
Spoke to [facility]  Relayed Sheila's message regarding the medication  Informed her that there are bw orders placed in her chart and AG would like her to get them done right away  She wanted to talk to you    She is available around 12:00

## 2023-03-24 NOTE — TELEPHONE ENCOUNTER
I have placed an order for a thyroid scan  I will call her with her US results once read by the radiologist  This new test is a specialized scan to help identify the cause of her thyroid disease  I would like to start her on the initial treatment for hyperthyroidism and recheck studies in 4 weeks  Once agreeable, I will call in this medication  It will be 3 times per day, take with food  I need to see her in one week to make sure she is tolerating the medication OK       Elkin Her No

## 2023-03-24 NOTE — TELEPHONE ENCOUNTER
I spoke with [facility] twice this morning to provide some reassurance  I advise that she can reach out via [employer] for any additional questions or concerns  No further action

## 2023-03-24 NOTE — TELEPHONE ENCOUNTER
Received a call from Nuclear Medicine at [facility]    Informed us that Luci Galicia needs to be off of the propylthiouracil 50 mg tablet 5 days before that test       If ok by you, I will call her and tell her

## 2023-03-24 NOTE — TELEPHONE ENCOUNTER
Just wanted to let you know that Vivian Feliciano was suppose to have an appt today with endo  They called her this morning and stated that the dr had an emergency and they could not see her today and her appt now is April 12th and to let her PCP know and have you follow her until she can get in with them    She is on a waiting list with them    Please advise

## 2023-03-27 NOTE — TELEPHONE ENCOUNTER
Kavya Mendez had to cancel the US this Thursday and Friday  She is not feeling good, so she did not want to get it done      She is scheduled for the next 4/6

## 2023-03-29 DIAGNOSIS — E04.1 THYROID NODULE: Primary | ICD-10-CM

## 2023-04-04 ENCOUNTER — OFFICE VISIT (OUTPATIENT)
Dept: OTOLARYNGOLOGY | Facility: CLINIC | Age: 28
End: 2023-04-04

## 2023-04-04 VITALS — WEIGHT: 210 LBS | BODY MASS INDEX: 33.75 KG/M2 | TEMPERATURE: 97.5 F | HEIGHT: 66 IN

## 2023-04-04 DIAGNOSIS — E04.1 THYROID NODULE: Primary | ICD-10-CM

## 2023-04-04 DIAGNOSIS — E05.90 HYPERTHYROIDISM: ICD-10-CM

## 2023-04-04 NOTE — PROGRESS NOTES
"Assessment/Plan:    Thyroid nodule  Thyroid nodule with hyperthyroidism  Pt is symptomatic for hyperthyroidism - anxiety, mood swings, sensation of fainting, Feeling hot, and other symptoms  Oral medication currently on hold due to pending nuclear scan  TSh level 0 0117  Thyroid US indicating   Right lobe: 5 4 x 1 7 x 1 6 cm  Volume 7 0 mL  Left lobe:  4 7 x 1 6 x 1 7 cm  Volume 6 0 mL  Isthmus: 0 6  cm      Left mid pole measuring 1 1 x 0 6 x 0 8 cm  COMPOSITION:  2 points, solid or almost completely solid   ECHOGENICITY:  3 points, very hypoechoic  SHAPE:  0 points, wider-than-tall  MARGIN: 0 points, smooth  ECHOGENIC FOCI:  3 points, punctate echogenic foci  TI-RADS Classification: TR 5 (7 or > points), Highly suspicious  FNA if > 1 cm  Follow if > 0 5 cm  Discussed TEDDY guidelines, and indications for further interventions  Discussed with pt in great detail that nodule may be benign, \"hot\" (hyper), suspicious, or cancerous  Reviewed options for treatment including nuclear thyroid scan for hyperthyroid/\"hot\" nodule, [facility], lymph node mapping, radioactive ablation of the nodule/gland, or surgical removal of thyroid (lobectomy versus total thyroidectomy)  Discussed in detail actual testing of the thyroid nuclear scan and US guided FNAB with [facility]  Recommend she maintain her appt with endocrinology for medical input related to thyroid treatment  Answered pt questions regarding hyperthyroidism and thyroid cancer  After discussion agreed to proceed with nuclear scan as scheduled, endocrinology eval as scheduled  Order placed for US guided FNAB, may or may not proceed with this depending on results of thyroid scan and endocrinology evaluation  Pt will follow up with Dr Rajani Arce once testing completed to determine if surgical intervention is required     Follow up depending on test results             Diagnoses and all orders for this visit:    Thyroid " "nodule  -     US guided thyroid biopsy with [facility]; Future    Hyperthyroidism          Subjective:      Patient ID: Jasmyne Bonilla is a 33 y o  female  Presents today as a follow up due to thyroid nodule  Began with episodes body overheating  Fatigue  Sensation would pass out if didn't eat  Underwent blood work in March and then thyroid US  Pending nuclear medicine thyroid test   Pending appt with endocrinology next week  The following portions of the patient's history were reviewed and updated as appropriate: allergies, current medications, past family history, past medical history, past social history, past surgical history and problem list     Review of Systems   Constitutional: Negative  HENT: Negative for congestion, ear discharge, ear pain, hearing loss, nosebleeds, postnasal drip, rhinorrhea, sinus pressure, sinus pain, sore throat, tinnitus and voice change  Eyes: Negative  Respiratory: Negative for chest tightness and shortness of breath  Cardiovascular: Negative  Gastrointestinal: Negative  Endocrine: Negative  Musculoskeletal: Negative  Skin: Negative for color change  Neurological: Negative for dizziness, numbness and headaches  Psychiatric/Behavioral: Negative  Objective:      Temp 97 5 °F (36 4 °C) (Temporal)   Ht 5' 6\" (1 676 m)   Wt 95 3 kg (210 lb)   BMI 33 89 kg/m²          Physical Exam  Constitutional:       Appearance: She is well-developed  HENT:      Head: Normocephalic  Right Ear: Hearing, tympanic membrane, ear canal and external ear normal  No decreased hearing noted  No drainage or tenderness  Tympanic membrane is not perforated or erythematous  Left Ear: Hearing, tympanic membrane, ear canal and external ear normal  No decreased hearing noted  No drainage or tenderness  Tympanic membrane is not perforated or erythematous  Nose: Nose normal  No nasal deformity or septal deviation        Mouth/Throat:      Mouth: Mucous " membranes are not pale and not dry  No oral lesions  Dentition: Normal dentition  Pharynx: Uvula midline  No oropharyngeal exudate  Neck:      Trachea: No tracheal deviation  Cardiovascular:      Rate and Rhythm: Normal rate  Pulmonary:      Effort: Pulmonary effort is normal  No accessory muscle usage or respiratory distress  Musculoskeletal:      Cervical back: Full passive range of motion without pain, normal range of motion and neck supple  Lymphadenopathy:      Cervical: No cervical adenopathy  Skin:     General: Skin is warm and dry  Neurological:      Mental Status: She is alert and oriented to person, place, and time  Cranial Nerves: No cranial nerve deficit  Sensory: No sensory deficit  Psychiatric:         Behavior: Behavior is cooperative

## 2023-04-04 NOTE — ASSESSMENT & PLAN NOTE
"Thyroid nodule with hyperthyroidism  Pt is symptomatic for hyperthyroidism - anxiety, mood swings, sensation of fainting, Feeling hot, and other symptoms  Oral medication currently on hold due to pending nuclear scan  TSh level 0 0117  Thyroid US indicating   Right lobe: 5 4 x 1 7 x 1 6 cm  Volume 7 0 mL  Left lobe:  4 7 x 1 6 x 1 7 cm  Volume 6 0 mL  Isthmus: 0 6  cm      Left mid pole measuring 1 1 x 0 6 x 0 8 cm  COMPOSITION:  2 points, solid or almost completely solid   ECHOGENICITY:  3 points, very hypoechoic  SHAPE:  0 points, wider-than-tall  MARGIN: 0 points, smooth  ECHOGENIC FOCI:  3 points, punctate echogenic foci  TI-RADS Classification: TR 5 (7 or > points), Highly suspicious  FNA if > 1 cm  Follow if > 0 5 cm  Discussed TDEDY guidelines, and indications for further interventions  Discussed with pt in great detail that nodule may be benign, \"hot\" (hyper), suspicious, or cancerous  Reviewed options for treatment including nuclear thyroid scan for hyperthyroid/\"hot\" nodule, FNAB of the nodule with [facility], lymph node mapping, radioactive ablation of the nodule/gland, or surgical removal of thyroid (lobectomy versus total thyroidectomy)  Discussed in detail actual testing of the thyroid nuclear scan and US guided FNAB with [facility]  Recommend she maintain her appt with endocrinology for medical input related to thyroid treatment  Answered pt questions regarding hyperthyroidism and thyroid cancer  After discussion agreed to proceed with nuclear scan as scheduled, endocrinology eval as scheduled  Order placed for US guided FNAB, may or may not proceed with this depending on results of thyroid scan and endocrinology evaluation  Pt will follow up with Dr Nellie Miller once testing completed to determine if surgical intervention is required     Follow up depending on test results        "

## 2023-04-06 ENCOUNTER — HOSPITAL ENCOUNTER (OUTPATIENT)
Dept: RADIOLOGY | Facility: HOSPITAL | Age: 28
Discharge: HOME | End: 2023-04-06

## 2023-04-06 DIAGNOSIS — E05.90 HYPERTHYROIDISM: ICD-10-CM

## 2023-04-14 NOTE — PROGRESS NOTES
COVID-19 Outpatient Progress Note    Assessment/Plan:    Problem List Items Addressed This Visit        Other    Cough    Relevant Orders    Novel Coronavirus (Covid-19),PCR [facility] - Collected at [facility] or [facility]      Other Visit Diagnoses     Close exposure to COVID-19 virus    -  Primary    Relevant Orders    Novel Coronavirus (Covid-19),PCR [facility] - Collected at [facility] or [facility]    Head congestion        Relevant Orders    Novel Coronavirus (Covid-19),PCR [facility] - Collected at [facility] or [facility]    Rhinorrhea        Relevant Orders    Novel Coronavirus (Covid-19),PCR [facility] - Collected at [facility] or [facility]    Dyspnea on exertion        Relevant Orders    Novel Coronavirus (Covid-19),PCR [facility] - Collected at [facility] or [facility]         Disposition:     I referred patient to one of our centralized sites for a COVID-19 swab  I have spent 15 minutes directly with the patient  Greater than 50% of this time was spent in counseling/coordination of care regarding: instructions for management and impressions  Verification of patient location:    Patient is located in the following state in which I hold an active license [place]    Encounter provider Christian Moreno    Provider located at [address] [place] [address]  [phone]    Recent Visits  No visits were found meeting these conditions  Showing recent visits within past 7 days and meeting all other requirements  Today's Visits  Date Type Provider Dept   09/22/21 Telemedicine Jose Armando Yost [facility]   Showing today's visits and meeting all other requirements  Future Appointments  No visits were found meeting these conditions  Showing future appointments within next 150 days and meeting all other requirements     This virtual check-in was done via [employer] and patient was informed that this is a secure, HIPAA-compliant platform   She agrees to proceed  Patient agrees to participate in a virtual check in via telephone or video visit instead of presenting to the office to address urgent/immediate medical needs  Patient is aware this is a billable service  After connecting through [facility], the patient was identified by name and date of birth  Mike Coleman was informed that this was a telemedicine visit and that the exam was being conducted confidentially over secure lines  My office door was closed  No one else was in the room  Mike Coleman acknowledged consent and understanding of privacy and security of the telemedicine visit  I informed the patient that I have reviewed her record in Epic and presented the opportunity for her to ask any questions regarding the visit today  The patient agreed to participate  Subjective:   Mike Coleman is a 31 y o  female who is concerned about COVID-19  Patient's symptoms include nasal congestion, rhinorrhea, cough and shortness of breath  Patient denies fever, chills, fatigue, malaise, sore throat, anosmia, loss of taste, chest tightness, abdominal pain, nausea, vomiting, diarrhea, myalgias and headaches       Date of symptom onset: 9/19/2021  Date of exposure: 9/19/2021  COVID-19 vaccination status: Not vaccinated       Lab Results   Component Value Date    SARSCOV2 Negative 04/02/2021    SARSCOV2 Not Detected 12/15/2020     Past Medical History:   Diagnosis Date    Wears glasses      Past Surgical History:   Procedure Laterality Date    FRACTURE SURGERY Right 2004    hip    HIP PINNING Right     Right hip pinning for fracture of growth plate at age 7     Current Outpatient Medications   Medication Sig Dispense Refill    albuterol (Ventolin HFA) 90 mcg/act inhaler Inhale 2 puffs every 4 (four) hours as needed for wheezing or shortness of breath 18 g 6    hydrocortisone 2 5 % cream Apply topically 2 (two) times a day 30 g 0    Loratadine (ALAVERT PO) Take by mouth daily at bedtime      mometasone-formoterol (Dulera) 100-5 MCG/ACT inhaler Inhale 2 puffs 2 (two) times a day Rinse mouth after use  13 g 2    mupirocin (BACTROBAN) 2 % ointment Apply topically 3 (three) times a day 22 g 0     No current facility-administered medications for this visit  Allergies   Allergen Reactions    Penicillins Hives    Toradol [Ketorolac Tromethamine] Throat Swelling    Latex Rash    Singulair [Montelukast] Headache       Review of Systems   Constitutional: Negative for chills, fatigue and fever  HENT: Positive for congestion, nosebleeds and rhinorrhea  Negative for sore throat  Respiratory: Positive for cough and shortness of breath  Negative for chest tightness  Gastrointestinal: Negative for abdominal pain, diarrhea, nausea and vomiting  Musculoskeletal: Negative for myalgias  Neurological: Negative for headaches  Objective:    Vitals:    09/22/21 1144   Temp: 100 4 °F (38 °C)   TempSrc: Tympanic   Weight: 96 2 kg (212 lb)   Height: 5' 4" (1 626 m)       Physical Exam  Vitals reviewed  Constitutional:       Appearance: Normal appearance  HENT:      Head: Normocephalic and atraumatic  Neurological:      Mental Status: She is alert and oriented to person, place, and time  Psychiatric:         Mood and Affect: Mood normal          VIRTUAL VISIT [facility] verbally agrees to participate in [employer]  Pt is aware that [employer] could be limited without vital signs or the ability to perform a full hands-on physical exam  Trina Mcdaniel understands she or the provider may request at any time to terminate the video visit and request the patient to seek care or treatment in person  [phone]

## 2023-04-17 DIAGNOSIS — E05.90 HYPERTHYROIDISM: Primary | ICD-10-CM

## 2023-05-03 ENCOUNTER — APPOINTMENT (OUTPATIENT)
Dept: LAB | Facility: CLINIC | Age: 28
End: 2023-05-03

## 2023-05-03 ENCOUNTER — TELEPHONE (OUTPATIENT)
Dept: MULTI SPECIALTY CLINIC | Facility: CLINIC | Age: 28
End: 2023-05-03

## 2023-05-03 ENCOUNTER — ANNUAL EXAM (OUTPATIENT)
Dept: OBGYN CLINIC | Facility: CLINIC | Age: 28
End: 2023-05-03

## 2023-05-03 VITALS
HEIGHT: 66 IN | BODY MASS INDEX: 33.27 KG/M2 | DIASTOLIC BLOOD PRESSURE: 72 MMHG | WEIGHT: 207 LBS | SYSTOLIC BLOOD PRESSURE: 128 MMHG

## 2023-05-03 DIAGNOSIS — E05.90 PRETIBIAL MYXEDEMA: ICD-10-CM

## 2023-05-03 DIAGNOSIS — N63.25 MASS OVERLAPPING MULTIPLE QUADRANTS OF LEFT BREAST: ICD-10-CM

## 2023-05-03 DIAGNOSIS — N89.8 VAGINAL ODOR: ICD-10-CM

## 2023-05-03 DIAGNOSIS — Z12.4 SCREENING FOR MALIGNANT NEOPLASM OF CERVIX: ICD-10-CM

## 2023-05-03 DIAGNOSIS — Z01.419 WELL WOMAN EXAM WITH ROUTINE GYNECOLOGICAL EXAM: Primary | ICD-10-CM

## 2023-05-03 LAB
T3FREE SERPL-MCNC: 2.26 PG/ML (ref 2.3–4.2)
T4 FREE SERPL-MCNC: 0.95 NG/DL (ref 0.76–1.46)
TSH SERPL DL<=0.05 MIU/L-ACNC: 0.1 UIU/ML (ref 0.45–4.5)

## 2023-05-03 NOTE — PROGRESS NOTES
ASSESSMENT & PLAN:   Diagnoses and all orders for this visit:    Well woman exam with routine gynecological exam  -     Liquid-based pap, screening    Screening for malignant neoplasm of cervix  -     Liquid-based pap, screening    Vaginal odor  -     Cancel: Genital Comprehensive Culture    Mass overlapping multiple quadrants of left breast  -     US breast left limited (diagnostic); Future          The following were reviewed in today's visit: ASCCP guidelines, Gardisil vaccination, STD testing breast self exam, family planning choices, exercise and healthy diet  Patient to return to office in yearly for annual exam      All questions have been answered to her satisfaction  CC:  Annual Gynecologic Examination  Chief Complaint   Patient presents with    Gynecologic Exam     PT is here for her annual exam and pap smear  She is complaining of a vaginal odor  yearly paps- Mom  from cervical CA  last pap: 10/14/20 NILM  neg pap 21       HPI: Leoncio Aragon is a 33 y o  Lenice Sires who presents for annual gynecologic examination  She has the following concerns:  She is worried that she has a smell to her  No one close to her says she smells  She feels like it's really bad RUSTAM  Started after she had covid  Health Maintenance:    Exercise: intermittently  Breast exams/breast awareness: no  Diet: well balanced diet      Past Medical History:   Diagnosis Date    Asthma     Hyperthyroidism     Nasal congestion 2018    Wears glasses        Past Surgical History:   Procedure Laterality Date    FRACTURE SURGERY Right 2004    hip    HIP PINNING Right     Right hip pinning for fracture of growth plate at age 7       Past OB/Gyn History:  Period Duration (Days): 3-4  Period Pattern: Regular  Menstrual Flow: Moderate, Light  Dysmenorrhea: NonePatient's last menstrual period was 2023      Last Pap:  : no abnormalities  History of abnormal Pap smear: no  HPV vaccine completed: yes    Patient is not currently sexually active     STD testing: no  Current contraception: none      Family History  Family History   Problem Relation Age of Onset    Cancer Mother         Passed away 6-12-21    Cervical cancer Mother     Diabetes Father     Diabetes unspecified Father     Asthma Sister     Hypothyroidism Sister    Sandeep Lone Infertility Sister     No Known Problems Sister     Ovarian cancer Maternal Aunt        Family history of uterine or ovarian cancer: yes  Family history of breast cancer: no  Family history of colon cancer: no    Social History:  Social History     Socioeconomic History    Marital status: Single     Spouse name: Not on file    Number of children: Not on file    Years of education: Not on file    Highest education level: Not on file   Occupational History    Not on file   Tobacco Use    Smoking status: Never    Smokeless tobacco: Never   Vaping Use    Vaping Use: Never used   Substance and Sexual Activity    Alcohol use: No    Drug use: Never    Sexual activity: Not Currently     Partners: Male     Birth control/protection: None   Other Topics Concern    Not on file   Social History Narrative        Who lives in your home: sister, brother in law    What type of home do you live in: single house        How long have you been living there: 4 yrs    Type of heat: forced hot air    Type of fuel: electric    What type of melanie is in your bedroom: tile    Do you have the following in or near your home:    Air products: central air, air filter, air , air purifier, de humidifier    Pests: no    Pets: {3 dogs, 2 cats    Are pets allowed in bedroom yes    Open fields, wooded areas nearby: wooded area    Basement:damp unfinished    Exposure to second hand smoke: no        Habits:    Caffeine:1 cup daily    Chocolate: once a month    Other:         Social Determinants of Health     Financial Resource Strain: Not on file   Food Insecurity: Not on file   Transportation "Needs: Not on file   Physical Activity: Not on file   Stress: Not on file   Social Connections: Not on file   Intimate Partner Violence: Not on file   Housing Stability: Not on file     Domestic violence screen: negative    Allergies: Allergies   Allergen Reactions    Bactrim [Sulfamethoxazole-Trimethoprim] Throat Swelling    Penicillins Hives    Toradol [Ketorolac Tromethamine] Throat Swelling    Latex Rash    Singulair [Montelukast] Headache       Medications:    Current Outpatient Medications:     albuterol (Ventolin HFA) 90 mcg/act inhaler, Inhale 2 puffs every 4 (four) hours as needed for wheezing or shortness of breath, Disp: 18 g, Rfl: 6    ALPRAZolam (XANAX) 0 25 mg tablet, Take 1 tablet (0 25 mg total) by mouth daily at bedtime as needed for anxiety, Disp: 15 tablet, Rfl: 0    Loratadine (ALAVERT PO), Take 10 mg by mouth daily at bedtime , Disp: , Rfl:     PARoxetine (PAXIL) 10 mg/5 mL suspension, Take 5 mL (10 mg total) by mouth every morning, Disp: 250 mL, Rfl: 0    propylthiouracil 50 mg tablet, Take 1 tablet (50 mg total) by mouth 3 (three) times a day (Patient not taking: Reported on 4/12/2023), Disp: 90 tablet, Rfl: 1    Review of Systems:  Review of Systems   Constitutional: Negative for chills and fever  Respiratory: Negative for cough and shortness of breath  Cardiovascular: Negative for chest pain and palpitations  Gastrointestinal: Negative for abdominal distention, abdominal pain, blood in stool, constipation, nausea and vomiting  Genitourinary: Negative for difficulty urinating, dysuria, frequency, menstrual problem, pelvic pain, urgency, vaginal bleeding, vaginal discharge and vaginal pain  Neurological: Negative for headaches  Physical Exam:  /72   Ht 5' 6\" (1 676 m)   Wt 93 9 kg (207 lb)   LMP 04/27/2023   BMI 33 41 kg/m²    Physical Exam  Constitutional:       General: She is awake  Appearance: Normal appearance  She is well-developed   " Genitourinary:      Vulva, bladder and urethral meatus normal       Right Labia: No rash, tenderness or lesions  Left Labia: No tenderness, lesions or rash  No labial fusion noted  No vaginal discharge, erythema, tenderness or bleeding  No vaginal prolapse present  No vaginal atrophy present  Right Adnexa: not tender, not full and no mass present  Left Adnexa: not tender, not full and no mass present  No cervical motion tenderness, discharge, lesion or polyp  Uterus is not enlarged, tender or irregular  No uterine mass detected  Uterus is anteverted  No urethral prolapse present  Bladder is not tender  Pelvic exam was performed with patient in the lithotomy position  Breasts:     Right: No inverted nipple, mass, nipple discharge, skin change or tenderness  Left: No inverted nipple, mass, nipple discharge, skin change or tenderness  HENT:      Head: Normocephalic and atraumatic  Cardiovascular:      Rate and Rhythm: Normal rate and regular rhythm  Heart sounds: Normal heart sounds  Pulmonary:      Effort: Pulmonary effort is normal  No tachypnea or respiratory distress  Breath sounds: Normal breath sounds  Chest:       Abdominal:      General: Abdomen is flat  There is no distension  Palpations: Abdomen is soft  Tenderness: There is no abdominal tenderness  There is no guarding or rebound  Musculoskeletal:      Cervical back: Neck supple  Lymphadenopathy:      Upper Body:      Right upper body: No supraclavicular or axillary adenopathy  Left upper body: No supraclavicular or axillary adenopathy  Neurological:      General: No focal deficit present  Mental Status: She is alert and oriented to person, place, and time  Psychiatric:         Mood and Affect: Mood normal          Behavior: Behavior normal          Thought Content:  Thought content normal          Judgment: Judgment normal    Vitals reviewed

## 2023-05-03 NOTE — TELEPHONE ENCOUNTER
Can we contact patient to see if she has done her next set of follow up thyroid labs and if not remind her to do so? Thanks!

## 2023-05-04 ENCOUNTER — TELEMEDICINE (OUTPATIENT)
Dept: FAMILY MEDICINE CLINIC | Facility: CLINIC | Age: 28
End: 2023-05-04

## 2023-05-04 VITALS — BODY MASS INDEX: 33.27 KG/M2 | HEIGHT: 66 IN | WEIGHT: 207 LBS

## 2023-05-04 DIAGNOSIS — E05.90 HYPERTHYROIDISM: ICD-10-CM

## 2023-05-04 DIAGNOSIS — F41.9 ANXIETY: ICD-10-CM

## 2023-05-04 DIAGNOSIS — E04.1 THYROID NODULE: ICD-10-CM

## 2023-05-04 DIAGNOSIS — F43.23 ADJUSTMENT DISORDER WITH MIXED ANXIETY AND DEPRESSED MOOD: Primary | ICD-10-CM

## 2023-05-04 DIAGNOSIS — E05.90 HYPERTHYROIDISM: Primary | ICD-10-CM

## 2023-05-04 RX ORDER — PAROXETINE 10 MG/5ML
20 SUSPENSION ORAL EVERY MORNING
Qty: 250 ML | Refills: 0 | Status: SHIPPED | OUTPATIENT
Start: 2023-05-04

## 2023-05-05 NOTE — ASSESSMENT & PLAN NOTE
Reviewed recent thyroid levels  Pt had been following up with endocrinology, Dr Igor Chavez  Notes reviewed

## 2023-05-05 NOTE — PROGRESS NOTES
Virtual Regular Visit    Verification of patient location:    Patient is located at Home in the following state in which I hold an active license [place]      Assessment/Plan:    Problem List Items Addressed This Visit        Endocrine    Thyroid nodule     Recommendations per endo  Hyperthyroidism     Reviewed recent thyroid levels  Pt had been following up with endocrinology, Dr Alondra Cartagena  Notes reviewed  Other    Adjustment disorder with mixed anxiety and depressed mood - Primary     Reports increased levels of anxiety r/t work stress  Recommend increase in dosing of paxil  Relevant Medications    PARoxetine (PAXIL) 10 mg/5 mL suspension   Other Visit Diagnoses     Anxiety        Relevant Medications    PARoxetine (PAXIL) 10 mg/5 mL suspension        RECHECK 4 WEEKS        Reason for visit is   Chief Complaint   Patient presents with    Medication adjustment    Virtual Regular Visit        [facility]    Provider located at [address] [place] [address]  [phone]      Recent Visits  Date Type Provider Dept   05/04/23 [address] [place] [address], [address] [place] [address] Physicians   Showing recent visits within past 7 days and meeting all other requirements  Future Appointments  No visits were found meeting these conditions  Showing future appointments within next 150 days and meeting all other requirements       The patient was identified by name and date of birth  Rima Mohan was informed that this is a telemedicine visit and that the visit is being conducted through the [address] [place] [address] Now platform  She agrees to proceed     My office door was closed  No one else was in the room  She acknowledged consent and understanding of privacy and security of the video platform  The patient has agreed to participate and understands they can discontinue the visit at any time      Patient is aware this is a billable service  Estela Mojica is a 33year old female who is scheduled for a virtual visit to discuss increased anxiety  Reports work-place stress and states that her company recently made layoffs but she did keep her job  Pt reports that she has been having work up on thyroid and also just received an order for a breast US to evaluate mass of breast  Reports that she has been using her alprazolam at night for sleep which is not her baseline  Denies any self harm or suicidial ideation  Past Medical History:   Diagnosis Date    Asthma 2021    Hyperthyroidism     Nasal congestion 8/2018    Wears glasses        Past Surgical History:   Procedure Laterality Date    FRACTURE SURGERY Right 2004    hip    HIP PINNING Right     Right hip pinning for fracture of growth plate at age 7       Current Outpatient Medications   Medication Sig Dispense Refill    albuterol (Ventolin HFA) 90 mcg/act inhaler Inhale 2 puffs every 4 (four) hours as needed for wheezing or shortness of breath 18 g 6    ALPRAZolam (XANAX) 0 25 mg tablet Take 1 tablet (0 25 mg total) by mouth daily at bedtime as needed for anxiety 15 tablet 0    Loratadine (ALAVERT PO) Take 10 mg by mouth daily at bedtime       PARoxetine (PAXIL) 10 mg/5 mL suspension Take 10 mL (20 mg total) by mouth every morning 250 mL 0     No current facility-administered medications for this visit  Allergies   Allergen Reactions    Bactrim [Sulfamethoxazole-Trimethoprim] Throat Swelling    Penicillins Hives    Toradol [Ketorolac Tromethamine] Throat Swelling    Latex Rash    Singulair [Montelukast] Headache       Review of Systems   Constitutional: Negative for chills, fatigue and fever  Respiratory: Negative for cough, chest tightness and shortness of breath  Cardiovascular: Negative for chest pain  Psychiatric/Behavioral: The patient is nervous/anxious          Video Exam    Vitals:    05/04/23 0956   Weight: 93 9 kg (207 lb) "  Height: 5' 6\" (1 676 m)       Physical Exam  Vitals reviewed  Constitutional:       Appearance: Normal appearance  HENT:      Head: Normocephalic and atraumatic  Neurological:      Mental Status: She is alert and oriented to person, place, and time     Psychiatric:         Mood and Affect: Mood normal          Speech: Speech normal          Behavior: Behavior normal           Visit Time  Total Visit Duration: 30 MINUTES      "

## 2023-05-09 LAB
LAB AP GYN PRIMARY INTERPRETATION: NORMAL
Lab: NORMAL

## 2023-05-23 ENCOUNTER — HOSPITAL ENCOUNTER (OUTPATIENT)
Dept: ULTRASOUND IMAGING | Facility: CLINIC | Age: 28
Discharge: HOME | End: 2023-05-23

## 2023-05-23 VITALS — WEIGHT: 207 LBS | BODY MASS INDEX: 33.27 KG/M2 | HEIGHT: 66 IN

## 2023-05-23 DIAGNOSIS — N63.25 MASS OVERLAPPING MULTIPLE QUADRANTS OF LEFT BREAST: ICD-10-CM

## 2023-07-19 ENCOUNTER — APPOINTMENT (OUTPATIENT)
Dept: LAB | Facility: CLINIC | Age: 28
End: 2023-07-19
Payer: COMMERCIAL

## 2023-07-19 DIAGNOSIS — E05.90 HYPERTHYROIDISM: Primary | ICD-10-CM

## 2023-07-19 DIAGNOSIS — E05.90 PRETIBIAL MYXEDEMA: Primary | ICD-10-CM

## 2023-07-19 LAB
T4 FREE SERPL-MCNC: 0.72 NG/DL (ref 0.61–1.12)
TSH SERPL DL<=0.05 MIU/L-ACNC: 3.88 UIU/ML (ref 0.45–4.5)

## 2023-07-19 PROCEDURE — 84439 ASSAY OF FREE THYROXINE: CPT

## 2023-07-19 PROCEDURE — 84443 ASSAY THYROID STIM HORMONE: CPT

## 2023-07-19 PROCEDURE — 36415 COLL VENOUS BLD VENIPUNCTURE: CPT

## 2023-07-20 ENCOUNTER — OFFICE VISIT (OUTPATIENT)
Dept: FAMILY MEDICINE CLINIC | Facility: CLINIC | Age: 28
End: 2023-07-20
Payer: COMMERCIAL

## 2023-07-20 VITALS
WEIGHT: 205 LBS | HEART RATE: 86 BPM | RESPIRATION RATE: 12 BRPM | HEIGHT: 66 IN | SYSTOLIC BLOOD PRESSURE: 118 MMHG | DIASTOLIC BLOOD PRESSURE: 78 MMHG | BODY MASS INDEX: 32.95 KG/M2 | TEMPERATURE: 97.3 F | OXYGEN SATURATION: 99 %

## 2023-07-20 DIAGNOSIS — J45.991 COUGH VARIANT ASTHMA: ICD-10-CM

## 2023-07-20 DIAGNOSIS — R05.3 CHRONIC COUGH: ICD-10-CM

## 2023-07-20 DIAGNOSIS — E05.90 HYPERTHYROIDISM: Primary | ICD-10-CM

## 2023-07-20 DIAGNOSIS — F43.21 GRIEF REACTION: ICD-10-CM

## 2023-07-20 DIAGNOSIS — F41.9 ANXIETY: ICD-10-CM

## 2023-07-20 DIAGNOSIS — F43.23 ADJUSTMENT DISORDER WITH MIXED ANXIETY AND DEPRESSED MOOD: ICD-10-CM

## 2023-07-20 DIAGNOSIS — G47.9 SLEEP DISTURBANCE: ICD-10-CM

## 2023-07-20 PROBLEM — R42 DIZZINESS: Status: RESOLVED | Noted: 2022-01-04 | Resolved: 2023-07-20

## 2023-07-20 PROBLEM — E86.0 DEHYDRATION: Status: RESOLVED | Noted: 2021-12-29 | Resolved: 2023-07-20

## 2023-07-20 PROCEDURE — 99214 OFFICE O/P EST MOD 30 MIN: CPT | Performed by: NURSE PRACTITIONER

## 2023-07-20 RX ORDER — ALPRAZOLAM 0.25 MG/1
0.25 TABLET ORAL
Qty: 15 TABLET | Refills: 0 | Status: SHIPPED | OUTPATIENT
Start: 2023-07-20

## 2023-07-20 RX ORDER — ALBUTEROL SULFATE 90 UG/1
2 AEROSOL, METERED RESPIRATORY (INHALATION) EVERY 4 HOURS PRN
Qty: 18 G | Refills: 6 | Status: SHIPPED | OUTPATIENT
Start: 2023-07-20

## 2023-07-20 NOTE — ASSESSMENT & PLAN NOTE
Pt thyroid studies have normalized. Following up with endocrinology. Stable, no changes. Recheck thyroid studies in 6 weeks per endocrinology recommendations.

## 2023-07-20 NOTE — PROGRESS NOTES
Assessment/Plan:    1. Hyperthyroidism  Assessment & Plan:  Pt thyroid studies have normalized. Following up with endocrinology. Stable, no changes. Recheck thyroid studies in 6 weeks per endocrinology recommendations. 2. Grief reaction  -     ALPRAZolam (XANAX) 0.25 mg tablet; Take 1 tablet (0.25 mg total) by mouth daily at bedtime as needed for anxiety    3. Sleep disturbance  -     ALPRAZolam (XANAX) 0.25 mg tablet; Take 1 tablet (0.25 mg total) by mouth daily at bedtime as needed for anxiety    4. Anxiety  -     ALPRAZolam (XANAX) 0.25 mg tablet; Take 1 tablet (0.25 mg total) by mouth daily at bedtime as needed for anxiety    5. Chronic cough  -     albuterol (Ventolin HFA) 90 mcg/act inhaler; Inhale 2 puffs every 4 (four) hours as needed for wheezing or shortness of breath    6. Cough variant asthma  -     albuterol (Ventolin HFA) 90 mcg/act inhaler; Inhale 2 puffs every 4 (four) hours as needed for wheezing or shortness of breath    7. Adjustment disorder with mixed anxiety and depressed mood  Assessment & Plan:  Pt reports that she is no longer taking paxil and doing well overall. Stable, no changes. Return if symptoms worsen or fail to improve. Subjective:      Patient ID: Diogenes Burr is a 33 y.o. female. Chief Complaint   Patient presents with   • Roddy Santana is a 33year old female with hyperthyroidism, anxiety and depression, who presents to the office for a med check. Reports that she is feeling well overall. States that she has not been taking her paxil and doing OK. Reports periodic anxiety and low mood but overall feeling well. The following portions of the patient's history were reviewed and updated as appropriate: allergies, current medications, past family history, past medical history, past social history, past surgical history and problem list.    Review of Systems   Constitutional: Negative for chills, fatigue and fever.    Respiratory: Negative for shortness of breath. Cardiovascular: Negative for chest pain. Current Outpatient Medications   Medication Sig Dispense Refill   • albuterol (Ventolin HFA) 90 mcg/act inhaler Inhale 2 puffs every 4 (four) hours as needed for wheezing or shortness of breath 18 g 6   • ALPRAZolam (XANAX) 0.25 mg tablet Take 1 tablet (0.25 mg total) by mouth daily at bedtime as needed for anxiety 15 tablet 0   • Loratadine (ALAVERT PO) Take 10 mg by mouth daily at bedtime        No current facility-administered medications for this visit. Objective:    /78 (BP Location: Right arm, Patient Position: Sitting, Cuff Size: Large)   Pulse 86   Temp (!) 97.3 °F (36.3 °C) (Temporal)   Resp 12   Ht 5' 6" (1.676 m)   Wt 93 kg (205 lb)   LMP 06/21/2023 (Exact Date)   SpO2 99%   BMI 33.09 kg/m²        Physical Exam  Vitals reviewed. Constitutional:       Appearance: Normal appearance. HENT:      Head: Normocephalic and atraumatic. Cardiovascular:      Rate and Rhythm: Normal rate and regular rhythm. Heart sounds: Normal heart sounds. Pulmonary:      Effort: Pulmonary effort is normal.      Breath sounds: Normal breath sounds. Neurological:      Mental Status: She is alert and oriented to person, place, and time.    Psychiatric:         Mood and Affect: Mood normal.                ALYSSA Devlin

## 2023-07-20 NOTE — LETTER
July 20, 2023     Patient: Ale Parker  YOB: 1995  Date of Visit: 7/20/2023      To Whom it May Concern:    Warren Yesenia is under my professional care. Gail Khnana was seen in my office on 7/20/2023. Gail Khanna may return to work on 7/21/23 . If you have any questions or concerns, please don't hesitate to call.          Sincerely,          Ariela Park

## 2023-08-02 ENCOUNTER — TELEPHONE (OUTPATIENT)
Dept: FAMILY MEDICINE CLINIC | Facility: CLINIC | Age: 28
End: 2023-08-02

## 2023-08-02 ENCOUNTER — OFFICE VISIT (OUTPATIENT)
Dept: FAMILY MEDICINE CLINIC | Facility: CLINIC | Age: 28
End: 2023-08-02
Payer: COMMERCIAL

## 2023-08-02 VITALS
BODY MASS INDEX: 33.27 KG/M2 | SYSTOLIC BLOOD PRESSURE: 100 MMHG | OXYGEN SATURATION: 99 % | WEIGHT: 207 LBS | TEMPERATURE: 96.4 F | HEIGHT: 66 IN | DIASTOLIC BLOOD PRESSURE: 78 MMHG | RESPIRATION RATE: 14 BRPM | HEART RATE: 108 BPM

## 2023-08-02 DIAGNOSIS — T78.40XD ALLERGIC REACTION, SUBSEQUENT ENCOUNTER: ICD-10-CM

## 2023-08-02 DIAGNOSIS — M25.471 RIGHT ANKLE SWELLING: ICD-10-CM

## 2023-08-02 DIAGNOSIS — T63.441D BEE STING, ACCIDENTAL OR UNINTENTIONAL, SUBSEQUENT ENCOUNTER: Primary | ICD-10-CM

## 2023-08-02 DIAGNOSIS — L03.115 CELLULITIS OF RIGHT LOWER EXTREMITY: ICD-10-CM

## 2023-08-02 PROCEDURE — 96372 THER/PROPH/DIAG INJ SC/IM: CPT | Performed by: NURSE PRACTITIONER

## 2023-08-02 PROCEDURE — 99214 OFFICE O/P EST MOD 30 MIN: CPT | Performed by: NURSE PRACTITIONER

## 2023-08-02 RX ORDER — CLINDAMYCIN PALMITATE HYDROCHLORIDE 75 MG/5ML
300 SOLUTION ORAL 3 TIMES DAILY
Qty: 600 ML | Refills: 0 | Status: SHIPPED | OUTPATIENT
Start: 2023-08-02 | End: 2023-08-12

## 2023-08-02 RX ORDER — DEXAMETHASONE SODIUM PHOSPHATE 4 MG/ML
4 INJECTION, SOLUTION INTRA-ARTICULAR; INTRALESIONAL; INTRAMUSCULAR; INTRAVENOUS; SOFT TISSUE ONCE
Status: COMPLETED | OUTPATIENT
Start: 2023-08-02 | End: 2023-08-02

## 2023-08-02 RX ORDER — PREDNISOLONE SODIUM PHOSPHATE 15 MG/5ML
20 SOLUTION ORAL 2 TIMES DAILY
Qty: 67 ML | Refills: 0 | Status: SHIPPED | OUTPATIENT
Start: 2023-08-02 | End: 2023-08-07

## 2023-08-02 RX ORDER — PREDNISONE 5 MG/ML
20 SOLUTION ORAL 2 TIMES DAILY
Qty: 200 ML | Refills: 0 | Status: SHIPPED | OUTPATIENT
Start: 2023-08-02 | End: 2023-08-02

## 2023-08-02 RX ADMIN — DEXAMETHASONE SODIUM PHOSPHATE 4 MG: 4 INJECTION, SOLUTION INTRA-ARTICULAR; INTRALESIONAL; INTRAMUSCULAR; INTRAVENOUS; SOFT TISSUE at 15:29

## 2023-08-02 NOTE — PROGRESS NOTES
Assessment/Plan:    1. Bee sting, accidental or unintentional, subsequent encounter  -     dexamethasone (DECADRON) injection 4 mg    2. Right ankle swelling  -     dexamethasone (DECADRON) injection 4 mg    3. Allergic reaction, subsequent encounter  -     dexamethasone (DECADRON) injection 4 mg    4. Cellulitis of right lower extremity  -     clindamycin (CLEOCIN) 75 mg/5 mL solution; Take 20 mL (300 mg total) by mouth 3 (three) times a day for 10 days            Patient Instructions   Start prednisone (tablets/suspension) tomorrow as you have already received dose of steroids today via injection        Return if symptoms worsen or fail to improve. Subjective:      Patient ID: Bharati Ma is a 33 y.o. female. Chief Complaint   Patient presents with   • Insect Bite     Pt was stung by a bee on her right ankle 3 days ago       Insect Bite  This is a new problem. The current episode started in the past 7 days (5 days ago). The problem occurs constantly. The problem has been gradually worsening. Associated symptoms include a rash. Pertinent negatives include no abdominal pain, chest pain, fever, numbness, sore throat or vomiting. Nothing aggravates the symptoms. She has tried nothing for the symptoms. The treatment provided no relief. The following portions of the patient's history were reviewed and updated as appropriate: allergies, current medications, past family history, past medical history, past social history, past surgical history and problem list.    Review of Systems   Constitutional: Negative for fever. HENT: Negative for facial swelling, sore throat and trouble swallowing. Respiratory: Negative for shortness of breath. Cardiovascular: Positive for leg swelling (right ankle). Negative for chest pain. Gastrointestinal: Negative for abdominal pain and vomiting. Skin: Positive for rash. Neurological: Negative for numbness.          Current Outpatient Medications   Medication Sig Dispense Refill   • albuterol (Ventolin HFA) 90 mcg/act inhaler Inhale 2 puffs every 4 (four) hours as needed for wheezing or shortness of breath 18 g 6   • ALPRAZolam (XANAX) 0.25 mg tablet Take 1 tablet (0.25 mg total) by mouth daily at bedtime as needed for anxiety 15 tablet 0   • clindamycin (CLEOCIN) 75 mg/5 mL solution Take 20 mL (300 mg total) by mouth 3 (three) times a day for 10 days 600 mL 0   • Loratadine (ALAVERT PO) Take 10 mg by mouth daily at bedtime      • prednisoLONE (ORAPRED) 15 mg/5 mL oral solution Take 6.7 mL (20 mg total) by mouth 2 (two) times a day for 5 days 67 mL 0     No current facility-administered medications for this visit. Objective:    /78 (BP Location: Right arm, Patient Position: Sitting, Cuff Size: Large)   Pulse (!) 108   Temp (!) 96.4 °F (35.8 °C) (Temporal)   Resp 14   Ht 5' 6" (1.676 m)   Wt 93.9 kg (207 lb)   LMP 06/21/2023 (Exact Date)   SpO2 99%   BMI 33.41 kg/m²        Physical Exam  Vitals reviewed. Constitutional:       Appearance: Normal appearance. HENT:      Head: Normocephalic and atraumatic. Mouth/Throat:      Mouth: Mucous membranes are moist.   Cardiovascular:      Rate and Rhythm: Regular rhythm. Heart sounds: Normal heart sounds. Skin:     Comments: Right ankle has well-demarcated erythema with swelling surrounding lateral malleolus, tender to palpation, scabbed, white pinpoint area of bite in center    Neurological:      Mental Status: She is alert and oriented to person, place, and time.    Psychiatric:         Mood and Affect: Mood normal.                ALYSSA Avila

## 2023-08-02 NOTE — PATIENT INSTRUCTIONS
Start prednisone (tablets/suspension) tomorrow as you have already received dose of steroids today via injection

## 2023-08-03 ENCOUNTER — HOSPITAL ENCOUNTER (EMERGENCY)
Facility: HOSPITAL | Age: 28
Discharge: HOME | End: 2023-08-03
Attending: EMERGENCY MEDICINE
Payer: COMMERCIAL

## 2023-08-03 VITALS
RESPIRATION RATE: 18 BRPM | HEART RATE: 72 BPM | OXYGEN SATURATION: 99 % | DIASTOLIC BLOOD PRESSURE: 91 MMHG | SYSTOLIC BLOOD PRESSURE: 129 MMHG | TEMPERATURE: 98.1 F

## 2023-08-03 DIAGNOSIS — S90.561A INSECT BITE OF RIGHT ANKLE, INITIAL ENCOUNTER: Primary | ICD-10-CM
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
## 2023-08-03 PROCEDURE — NC001 PR NO CHARGE: Performed by: EMERGENCY MEDICINE

## 2023-08-03 PROCEDURE — 76882 US LMTD JT/FCL EVL NVASC XTR: CPT | Performed by: EMERGENCY MEDICINE

## 2023-08-03 PROCEDURE — 99284 EMERGENCY DEPT VISIT MOD MDM: CPT | Performed by: EMERGENCY MEDICINE

## 2023-08-03 PROCEDURE — 99282 EMERGENCY DEPT VISIT SF MDM: CPT

## 2023-08-03 RX ORDER — CLINDAMYCIN PALMITATE HYDROCHLORIDE 75 MG/5ML
300 SOLUTION ORAL EVERY 6 HOURS SCHEDULED
Status: DISCONTINUED | OUTPATIENT
Start: 2023-08-03 | End: 2023-08-03 | Stop reason: HOSPADM

## 2023-08-03 RX ORDER — PREDNISOLONE SODIUM PHOSPHATE 15 MG/5ML
20 SOLUTION ORAL ONCE
Status: COMPLETED | OUTPATIENT
Start: 2023-08-03 | End: 2023-08-03

## 2023-08-03 RX ADMIN — CLINDAMYCIN PALMITATE HYDROCHLORIDE (PEDIATRIC) 300 MG: 75 SOLUTION ORAL at 14:38

## 2023-08-03 RX ADMIN — PREDNISOLONE SODIUM PHOSPHATE 20 MG: 15 SOLUTION ORAL at 14:18

## 2023-08-03 NOTE — DISCHARGE INSTRUCTIONS
Continue taking your prescription prednisolone and clindamycin beginning this evening. Please return to the ED with worsening symptoms, including dyspnea, chest pain, worsening edema/pain of right ankle.

## 2023-08-03 NOTE — ED ATTENDING ATTESTATION
8/3/2023  IMaggy MD, saw and evaluated the patient. I have discussed the patient with the resident/non-physician practitioner and agree with the resident's/non-physician practitioner's findings, Plan of Care, and MDM as documented in the resident's/non-physician practitioner's note, except where noted. All available labs and Radiology studies were reviewed. I was present for key portions of any procedure(s) performed by the resident/non-physician practitioner and I was immediately available to provide assistance. At this point I agree with the current assessment done in the Emergency Department. I have conducted an independent evaluation of this patient a history and physical is as follows:    History    Patient is a 65-year-old female, with no pertinent medical history, who presents to the ED today with a 1 day history of lateral right ankle color change. There is associated swelling, itching, pain that is now resolved. Patient symptoms began after a bug bite/sting the day prior. She was evaluated by her PCP and prescribed clindamycin/steroids but patient has been unable to fill the prescription as they were oral.  She was advised by her PCP to present to the ED today. Patient denies fever, abdominal pain, dyspnea, vomiting, diarrhea, weakness, numbness. Patient states her symptoms have been improving since yesterday. Patient's last tetanus update was 3 years ago. Patient is without other concerns at this time. ROS  Patient denies: Fever; dysphagia; vision change; chest pain; dyspnea; abdominal pain; polyuria; dysuria; vomiting; diarrhea weakness; numbness; difficulty walking; confusion    Physical Exam    GENERAL APPEARANCE: NAD  NEURO: Patient is speaking clearly in complete sentences. Patient is answering appropriately and able follow commands. Patient is moving all four extremities spontaneously. No facial droop. Tongue midline.   Patient able to ambulate without difficulty. HEENT: PERRL, Moist mucous membranes, external ears normal, nose normal  Neck: No cervical adenopathy  CV: RRR. No murmurs, rubs, gallops. 2+ DP pulses bilaterally. LUNGS: Clear to auscultation: No wheezes, stridor, rhonchi, rales  GI: Abdomen non-distended. Soft. Non-tender and without rebound or guarding   : Deferred at this time  MSK: Swelling of the lateral right ankle with blanchable bruising present. Area of erythema is smaller than the borders marked that were marked yesterday. No pain with palpation. Patient has full active range of motion of the ankle without pain out of proportion to exam.  No pain with calf squeeze. No calf swelling. The ankle is not warm to touch. Skin: Warm and dry  Capillary refill: <2 seconds    Assessment/Plan  Right ankle bruising/itching  -Insect bite. Low clinical suspicion for cellulitis at this time based on history and physical exam.  No reason to suspect fracture, septic arthritis, compartment syndrome at this time based on history and physical exam.  -Low suspicion for cellulitis discussed with patient; however, as she was prescribed the clindamycin and steroids by her PCP, the medications were offered in the ED after this was explained. Patient elected to fill her prescription and have her first dose in the ED here. Patient instructed to follow-up with her PCP.     ED Course         Critical Care Time  Procedures

## 2023-08-03 NOTE — ED PROCEDURE NOTE
Procedure  POC MSK/Soft Tissue US    Date/Time: 8/3/2023 2:55 PM    Performed by: Roger Mina MD  Authorized by: Roger Mina MD    Procedure:     Performed: soft tissue ultrasound    Procedure details:     Exam Type:  Diagnostic and educational    Longitudinal view:  Obtained    Transverse view:  Obtained    Image quality: limited diagnostic      Image availability:  Images available in PACS  Soft tissue ultrasound:     Soft tissue indications: swelling and suspected abscess      Anatomic location:  Lower extremity    Soft tissue findings: cobblestoning    Interpretation:     Soft tissue impressions: indeterminate                       Roger Mina MD  08/03/23 1456

## 2023-08-03 NOTE — ED PROVIDER NOTES
History  Chief Complaint   Patient presents with   • Insect Bite     Pt arrives c/o R ankle pain, swelling and discoloration. States she felt a sting while cutting the grass and swatted at her leg and noticed something flying away. Seen by PCP yesterday for the swelling and got a steroid shot, noticeable decrease in swelling as pt had area outlines yesterday. This morning pt noticed purple discoloration in center of drawn [place]. Area cool to touch, pt denies fevers. Pt given clindamycin and prednisone scripts but not available until later this evening. HPI patient is a 79-year-old female    Prior to Admission Medications   Prescriptions Last Dose Informant Patient Reported? Taking?    ALPRAZolam (XANAX) 0.25 mg tablet  Self No No   Sig: Take 1 tablet (0.25 mg total) by mouth daily at bedtime as needed for anxiety   Loratadine (ALAVERT PO)  Self Yes No   Sig: Take 10 mg by mouth daily at bedtime    albuterol (Ventolin HFA) 90 mcg/act inhaler  Self No No   Sig: Inhale 2 puffs every 4 (four) hours as needed for wheezing or shortness of breath   clindamycin (CLEOCIN) 75 mg/5 mL solution   No No   Sig: Take 20 mL (300 mg total) by mouth 3 (three) times a day for 10 days   prednisoLONE (ORAPRED) 15 mg/5 mL oral solution   No No   Sig: Take 6.7 mL (20 mg total) by mouth 2 (two) times a day for 5 days      Facility-Administered Medications Last Administration Doses Remaining   dexamethasone (DECADRON) injection 4 mg 8/2/2023  3:29 PM 0          Past Medical History:   Diagnosis Date   • Asthma 2021   • Hyperthyroidism    • Nasal congestion 8/2018   • Wears glasses        Past Surgical History:   Procedure Laterality Date   • FRACTURE SURGERY Right 2004    hip   • HIP PINNING Right     Right hip pinning for fracture of growth plate at age 7       Family History   Problem Relation Age of Onset   • Cancer Mother         Passed away 6-12-21   • Cervical cancer Mother    • Diabetes Father    • Diabetes unspecified Father • Asthma Sister    • Hypothyroidism Sister    • Infertility Sister    • No Known Problems Sister    • Throat cancer Maternal Grandfather    • Ovarian cancer Maternal Aunt    • Diabetes Maternal Aunt    • Throat cancer Maternal Uncle    • Testicular cancer Cousin      I have reviewed and agree with the history as documented. E-Cigarette/Vaping   • E-Cigarette Use Never User      E-Cigarette/Vaping Substances   • Nicotine No    • THC No    • CBD No    • Flavoring No    • Other No    • Unknown No      Social History     Tobacco Use   • Smoking status: Never   • Smokeless tobacco: Never   Vaping Use   • Vaping Use: Never used   Substance Use Topics   • Alcohol use: No   • Drug use: Never        Review of Systems   Constitutional: Negative. HENT: Negative. Eyes: Negative. Respiratory: Negative. Cardiovascular: Negative. Gastrointestinal: Negative. Genitourinary: Negative. Musculoskeletal: Negative. Skin: Positive for color change (right lateral ankle discoloration). Neurological: Negative. All other systems reviewed and are negative. Physical Exam  ED Triage Vitals [08/03/23 1311]   Temperature Pulse Respirations Blood Pressure SpO2   98.1 °F (36.7 °C) 72 18 129/91 99 %      Temp Source Heart Rate Source Patient Position - Orthostatic VS BP Location FiO2 (%)   Oral -- -- -- --      Pain Score       --             Orthostatic Vital Signs  Vitals:    08/03/23 1311   BP: 129/91   Pulse: 72       Physical Exam  Vitals and nursing note reviewed. Constitutional:       General: She is not in acute distress. Appearance: She is not ill-appearing, toxic-appearing or diaphoretic. HENT:      Head: Normocephalic and atraumatic. Nose: Nose normal.      Mouth/Throat:      Mouth: Mucous membranes are dry. Pharynx: Oropharynx is clear. Eyes:      Extraocular Movements: Extraocular movements intact.       Conjunctiva/sclera: Conjunctivae normal.      Pupils: Pupils are equal, round, and reactive to light. Cardiovascular:      Rate and Rhythm: Normal rate and regular rhythm. Pulses: Normal pulses. Heart sounds: Normal heart sounds. Pulmonary:      Effort: Pulmonary effort is normal. No respiratory distress. Breath sounds: Normal breath sounds. No stridor. No wheezing, rhonchi or rales. Chest:      Chest wall: No tenderness. Abdominal:      General: Abdomen is flat. There is no distension. Palpations: Abdomen is soft. Tenderness: There is no abdominal tenderness. There is no guarding. Musculoskeletal:         General: Swelling (Right lower extremity at lateral malleolus to top of right foot) present. No tenderness. Cervical back: Normal range of motion and neck supple. Skin:     General: Skin is warm and dry. Findings: Bruising and lesion present. Neurological:      General: No focal deficit present. Mental Status: She is alert and oriented to person, place, and time. Motor: No weakness. Psychiatric:         Mood and Affect: Mood normal.         Behavior: Behavior normal.         ED Medications  Medications - No data to display    Diagnostic Studies  Results Reviewed     None                 No orders to display         Procedures  Procedures      ED Course                                       MDM      Disposition  Final diagnoses:   None     ED Disposition     None      Follow-up Information    None         Patient's Medications   Discharge Prescriptions    No medications on file     No discharge procedures on file. PDMP Review       Value Time User    PDMP Reviewed  Yes 7/20/2023  2:22 PM Leo Barrera, [address] [facility]           ED Provider  Attending physically available and evaluated Anastasiya Matthews. I managed the patient along with the ED Attending.     Electronically Signed by display         Procedures  Procedures      ED Course     Pt is hemodynamically stable, normal vitals with improving symptoms from yesterday. No acute distress. Doubt infectious process. Pt received prednisolone oral solution, is going to  her rx this afternoon when available including clindamycin as previously prescribed. Discussed return precautions, she is agreeable to plan. Medical Decision Making  Patient is a 23-year-old female presenting to the ED with possible insect bite/sting to right ankle a few days prior to arrival while she was mowing the grass. Symptoms worsened over the past few days, worst yesterday, wherein the area became inflamed and edematous. She then saw her pcp yesterday as a result - administered dexamethasone which helped decrease the swelling, was supposed to continue steroids and clindamycin but has to have liquid medications which the pharmacy did not yet have. She was referred to ED as she was unable to obtain the liquid form. Currently feels much improved, edema and erythema much improved from yesterday. No anaphylactic reaction, no systemic infectious symptoms. Pt is hemodynamically stable, normal vitals with improving symptoms from yesterday. No acute distress. Doubt infectious process. Pt received prednisolone oral solution, is going to  her rx this afternoon when available including clindamycin as previously prescribed. Pt is agreeable to plan, discussed return precautions. Risk  Prescription drug management. DDx including but not limited to: Allergic reaction, urticaria, angioedema, cellulitis, anaphylaxis, cellulitis. Disposition  Final diagnoses:   Insect bite of right ankle, initial encounter     Time reflects when diagnosis was documented in both MDM as applicable and the Disposition within this note     Time User Action Codes Description Comment    8/3/2023  2:06 PM Bert Marcial, [address],  [place] [place] Insect bite of right ankle, initial encounter       ED Disposition     ED Disposition   Discharge    Condition   Stable    Date/Time   Thu Aug 3, 2023  2:05 PM    [id number] discharge to home/self care. Follow-up Information     Follow up With Specialties Details Why Contact Info Additional [address], [address] [facility], Nurse Practitioner  As needed, one week [address] [facility] Emergency Department Emergency Medicine  As needed, worsening symptoms [address] Emergency Department, [place], [place], [address]          Discharge Medication List as of 8/3/2023  2:08 PM      CONTINUE these medications which have NOT CHANGED    Details   albuterol (Ventolin HFA) 90 mcg/act inhaler Inhale 2 puffs every 4 (four) hours as needed for wheezing or shortness of breath, Starting Thu 7/20/2023, Normal      ALPRAZolam (XANAX) 0.25 mg tablet Take 1 tablet (0.25 mg total) by mouth daily at bedtime as needed for anxiety, Starting Thu 7/20/2023, Normal      clindamycin (CLEOCIN) 75 mg/5 mL solution Take 20 mL (300 mg total) by mouth 3 (three) times a day for 10 days, Starting Wed 8/2/2023, Until Sat 8/12/2023, Normal      Loratadine (ALAVERT PO) Take 10 mg by mouth daily at bedtime , Historical Med      prednisoLONE (ORAPRED) 15 mg/5 mL oral solution Take 6.7 mL (20 mg total) by mouth 2 (two) times a day for 5 days, Starting Wed 8/2/2023, Until Mon 8/7/2023, Normal           No discharge procedures on file. PDMP Review       Value Time User    PDMP Reviewed  Yes 7/20/2023  2:22 PM Dylon Crekodak, [address] [facility]           ED Provider  Attending physically available and evaluated Eric Sebastian. I managed the patient along with the ED Attending.     Electronically Signed by         Юлия Marrero DO  08/07/23 5911

## 2023-08-12 ENCOUNTER — HOSPITAL ENCOUNTER (OUTPATIENT)
Dept: RADIOLOGY | Facility: HOSPITAL | Age: 28
Discharge: HOME | End: 2023-08-12
Payer: COMMERCIAL

## 2023-08-12 DIAGNOSIS — E04.1 THYROID NODULE: ICD-10-CM

## 2023-08-12 DIAGNOSIS — E05.90 HYPERTHYROIDISM: ICD-10-CM

## 2023-08-12 PROCEDURE — 76536 US EXAM OF HEAD AND NECK: CPT

## 2023-08-22 DIAGNOSIS — E04.1 THYROID NODULE: Primary | ICD-10-CM

## 2023-08-22 NOTE — PROGRESS NOTES
Reviewed thyroid us results. Per US unchanged in size with macrocalcification, not currently meeting criteria for FNAB. Recommend repeat thyroid us in spring 2024. Discussed with pt her concerns that when she attempted the thyroid US they were unable to find the nodule that day and it was a black shadow. Since there have been 2 thyroid us with confirmed nodule, recommend repeat the us in spring and consider further options from there.   Will also defer to endocrinology for their recommendations as well

## 2023-10-06 ENCOUNTER — OFFICE VISIT (OUTPATIENT)
Dept: FAMILY MEDICINE CLINIC | Facility: CLINIC | Age: 28
End: 2023-10-06
Payer: COMMERCIAL

## 2023-10-06 VITALS
SYSTOLIC BLOOD PRESSURE: 118 MMHG | WEIGHT: 205 LBS | HEIGHT: 66 IN | BODY MASS INDEX: 32.95 KG/M2 | DIASTOLIC BLOOD PRESSURE: 86 MMHG | TEMPERATURE: 97.2 F | RESPIRATION RATE: 20 BRPM | OXYGEN SATURATION: 96 % | HEART RATE: 88 BPM

## 2023-10-06 DIAGNOSIS — Z23 NEED FOR INFLUENZA VACCINATION: ICD-10-CM

## 2023-10-06 DIAGNOSIS — F43.21 GRIEF REACTION: ICD-10-CM

## 2023-10-06 DIAGNOSIS — F43.23 ADJUSTMENT DISORDER WITH MIXED ANXIETY AND DEPRESSED MOOD: Primary | ICD-10-CM

## 2023-10-06 PROCEDURE — 99213 OFFICE O/P EST LOW 20 MIN: CPT | Performed by: NURSE PRACTITIONER

## 2023-10-06 PROCEDURE — 90471 IMMUNIZATION ADMIN: CPT

## 2023-10-06 PROCEDURE — 90686 IIV4 VACC NO PRSV 0.5 ML IM: CPT

## 2023-10-06 RX ORDER — ESCITALOPRAM OXALATE 5 MG/5ML
SOLUTION ORAL
Qty: 240 ML | Refills: 0 | Status: SHIPPED | OUTPATIENT
Start: 2023-10-06

## 2023-10-06 NOTE — ASSESSMENT & PLAN NOTE
Pt has done well on lexapro in the past. Will restart at 5mg daily and increase to 10 mg after one week. Recheck 4 weeks. Pt was previously on 20 mg daily and plan is to increase her to that dose at 4 week interval pending drug tolerance. Encourage counseling.

## 2023-10-06 NOTE — PROGRESS NOTES
Assessment/Plan:    1. Adjustment disorder with mixed anxiety and depressed mood  Assessment & Plan:  Pt has done well on lexapro in the past. Will restart at 5mg daily and increase to 10 mg after one week. Recheck 4 weeks. Pt was previously on 20 mg daily and plan is to increase her to that dose at 4 week interval pending drug tolerance. Encourage counseling. Orders:  -     escitalopram (LEXAPRO) 5 MG/5ML solution; Take 5 ml x's 7 days, then increase to 10 ml PO daily  -     Ambulatory Referral to [address] [place] [address]; Future    2. Need for influenza vaccination  -     influenza vaccine, quadrivalent, 0.5 mL, preservative-free, for adult and pediatric patients 6 mos+ (AFLURIA, FLUARIX, FLULAVAL, FLUZONE)    3. Grief reaction  -     Ambulatory Referral to [address] [place] [address]; Future      Depression Screening and Follow-up Plan: Patient assessed for underlying major depression. Brief counseling provided and recommend additional follow-up/re-evaluation next office visit. Patient advised to follow-up with PCP for further management. Return in about 4 weeks (around 11/3/2023) for Recheck. Subjective:      Patient ID: Magy Arizmendi is a 33 y.o. female. Chief Complaint   Patient presents with   • Beth Hercules is a 33year old female who presents to the office for discussion of anxiety. Pt has been on lexapro in the past and she states this worked for her and she is interested in going back on medication. Reports that she has been having increased anxiety symptoms and reports that work is her main stressor at this time. Reports that she continues to seek counseling but states that many providers do not take her insurance.        The following portions of the patient's history were reviewed and updated as appropriate: allergies, current medications, past family history, past medical history, past social history, past surgical history and problem list.    Review of Systems   Constitutional: Negative for chills, fatigue and fever. Respiratory: Negative for cough, chest tightness and shortness of breath. Cardiovascular: Negative for chest pain. Psychiatric/Behavioral: Negative for dysphoric mood and sleep disturbance. The patient is nervous/anxious. Current Outpatient Medications   Medication Sig Dispense Refill   • albuterol (Ventolin HFA) 90 mcg/act inhaler Inhale 2 puffs every 4 (four) hours as needed for wheezing or shortness of breath 18 g 6   • ALPRAZolam (XANAX) 0.25 mg tablet Take 1 tablet (0.25 mg total) by mouth daily at bedtime as needed for anxiety 15 tablet 0   • escitalopram (LEXAPRO) 5 MG/5ML solution Take 5 ml x's 7 days, then increase to 10 ml PO daily 240 mL 0   • Loratadine (ALAVERT PO) Take 10 mg by mouth daily at bedtime        No current facility-administered medications for this visit. Objective:    /86 (BP Location: Right arm, Patient Position: Sitting, Cuff Size: Large)   Pulse 88   Temp (!) 97.2 °F (36.2 °C) (Temporal)   Resp 20   Ht 5' 6" (1.676 m)   Wt 93 kg (205 lb)   SpO2 96%   BMI 33.09 kg/m²        Physical Exam  Vitals reviewed. Constitutional:       Appearance: Normal appearance. HENT:      Head: Normocephalic and atraumatic. Cardiovascular:      Rate and Rhythm: Normal rate and regular rhythm. Heart sounds: Normal heart sounds. Pulmonary:      Breath sounds: Normal breath sounds. Neurological:      Mental Status: She is alert and oriented to person, place, and time.    Psychiatric:         Mood and Affect: Mood normal.                ALYSSA Elliott

## 2023-11-02 ENCOUNTER — TELEMEDICINE (OUTPATIENT)
Dept: FAMILY MEDICINE CLINIC | Facility: CLINIC | Age: 28
End: 2023-11-02
Payer: COMMERCIAL

## 2023-11-02 VITALS — HEIGHT: 66 IN | BODY MASS INDEX: 32.95 KG/M2 | WEIGHT: 205 LBS

## 2023-11-02 DIAGNOSIS — F43.23 ADJUSTMENT DISORDER WITH MIXED ANXIETY AND DEPRESSED MOOD: Primary | ICD-10-CM

## 2023-11-02 PROCEDURE — 99213 OFFICE O/P EST LOW 20 MIN: CPT | Performed by: NURSE PRACTITIONER

## 2023-11-02 NOTE — PROGRESS NOTES
Virtual Regular Visit    Verification of patient location:    Patient is located at Home in the following state in which I hold an active license [place]      Assessment/Plan:    Problem List Items Addressed This Visit          Other    Adjustment disorder with mixed anxiety and depressed mood - Primary     Pt is doing well on lexapro 10 mg daily. Advise that she continue dosing. Encourage counseling, healthy coping and self care. Stable, no changes. Reason for visit is   Chief Complaint   Patient presents with    Virtual Regular Visit          Encounter provider ALYSSA Ball    Provider located at [address] [id number]      Recent Visits  Date Type Provider Dept   11/02/23 [id number] [place] [address] 190 Physicians   Showing recent visits within past 7 days and meeting all other requirements  Future Appointments  No visits were found meeting these conditions. Showing future appointments within next 150 days and meeting all other requirements       The patient was identified by name and date of birth. Henri Sumner was informed that this is a telemedicine visit and that the visit is being conducted through the [address] [place] [address] 22 Now platform. She agrees to proceed. .  My office door was closed. No one else was in the room. She acknowledged consent and understanding of privacy and security of the video platform. The patient has agreed to participate and understands they can discontinue the visit at any time. Patient is aware this is a billable service. Roger Nation is a 33 y.o. female who is scheduled for a virtual follow up and med check. Pt reports that she has been doing well on her current medication dosing. Denies any acute complaints today.       Past Medical History:   Diagnosis Date    Asthma 2021    Hyperthyroidism     Nasal congestion 8/2018    Wears glasses Past Surgical History:   Procedure Laterality Date    FRACTURE SURGERY Right 2004    hip    HIP PINNING Right     Right hip pinning for fracture of growth plate at age 7       Current Outpatient Medications   Medication Sig Dispense Refill    albuterol (Ventolin HFA) 90 mcg/act inhaler Inhale 2 puffs every 4 (four) hours as needed for wheezing or shortness of breath 18 g 6    ALPRAZolam (XANAX) 0.25 mg tablet Take 1 tablet (0.25 mg total) by mouth daily at bedtime as needed for anxiety 15 tablet 0    escitalopram (LEXAPRO) 5 MG/5ML solution Take 5 ml x's 7 days, then increase to 10 ml PO daily 240 mL 0    Loratadine (ALAVERT PO) Take 10 mg by mouth daily at bedtime        No current facility-administered medications for this visit. Allergies   Allergen Reactions    Bactrim [Sulfamethoxazole-Trimethoprim] Throat Swelling    Penicillins Hives    Toradol [Ketorolac Tromethamine] Throat Swelling    Latex Rash    Singulair [Montelukast] Headache       Review of Systems   Constitutional:  Negative for chills, fatigue and fever. Respiratory:  Negative for cough, chest tightness and shortness of breath. Cardiovascular:  Negative for chest pain. Psychiatric/Behavioral:  Negative for dysphoric mood. The patient is not nervous/anxious. Video Exam    Vitals:    11/02/23 1711   Weight: 93 kg (205 lb)   Height: 5' 6" (1.676 m)       Physical Exam  Vitals reviewed. Constitutional:       Appearance: Normal appearance. HENT:      Head: Normocephalic and atraumatic. Neurological:      Mental Status: She is alert and oriented to person, place, and time.    Psychiatric:         Mood and Affect: Mood normal.          Visit Time  Total Visit Duration: 20 minutes

## 2023-11-03 NOTE — ASSESSMENT & PLAN NOTE
Pt is doing well on lexapro 10 mg daily. Advise that she continue dosing. Encourage counseling, healthy coping and self care. Stable, no changes.

## 2023-11-14 ENCOUNTER — NURSE TRIAGE (OUTPATIENT)
Dept: OTHER | Facility: OTHER | Age: 28
End: 2023-11-14

## 2023-11-14 NOTE — TELEPHONE ENCOUNTER
Reason for Disposition  • Patient wants to be seen    Answer Assessment - Initial Assessment Questions  1. LOCATION: "Where does it hurt?"       Headache, front of the head. 2. ONSET: "When did the sinus pain start?"  (e.g., hours, days)       3 days ago   3. SEVERITY: "How bad is the pain?"   (Scale 1-10; mild, moderate or severe)    - MILD (1-3): doesn't interfere with normal activities     - MODERATE (4-7): interferes with normal activities (e.g., work or school) or awakens from sleep    - SEVERE (8-10): excruciating pain and patient unable to do any normal activities         5 out of 10   4. RECURRENT SYMPTOM: "Have you ever had sinus problems before?" If so, ask: "When was the last time?" and "What happened that time?"       Recurrent   5. NASAL CONGESTION: "Is the nose blocked?" If so, ask, "Can you open it or must you breathe through the mouth?"      Yes   6. NASAL DISCHARGE: "Do you have discharge from your nose?" If so ask, "What color?"      Yellow   7. FEVER: "Do you have a fever?" If so, ask: "What is it, how was it measured, and when did it start?"       No thermometer, unable to assess. 8. OTHER SYMPTOMS: "Do you have any other symptoms?" (e.g., sore throat, cough, earache, difficulty breathing)      Cough   9. PREGNANCY: "Is there any chance you are pregnant?" "When was your last menstrual period?"      No. LMP more than a months ago.     Protocols used: Sinus Pain and Congestion-ADULT-[place]

## 2023-11-17 ENCOUNTER — TELEPHONE (OUTPATIENT)
Age: 28
End: 2023-11-17

## 2023-11-17 NOTE — TELEPHONE ENCOUNTER
[facility] called and asked if I could fax her the us diag left breast script from [facility] to

## 2023-12-11 ENCOUNTER — HOSPITAL ENCOUNTER (OUTPATIENT)
Dept: ULTRASOUND IMAGING | Facility: CLINIC | Age: 28
Discharge: HOME | End: 2023-12-11
Payer: COMMERCIAL

## 2023-12-11 DIAGNOSIS — R92.8 ABNORMAL FINDINGS ON DIAGNOSTIC IMAGING OF BREAST: ICD-10-CM

## 2023-12-11 PROCEDURE — 76642 ULTRASOUND BREAST LIMITED: CPT

## 2023-12-13 ENCOUNTER — TELEPHONE (OUTPATIENT)
Dept: OTHER | Facility: HOSPITAL | Age: 28
End: 2023-12-13

## 2023-12-13 NOTE — TELEPHONE ENCOUNTER
Patient called she has an appointment tomorrow at 3p and was wondering if this appointment could be switched to a virtual. Please follow up with patient on if this is okay or not.

## 2023-12-14 ENCOUNTER — TELEMEDICINE (OUTPATIENT)
Dept: FAMILY MEDICINE CLINIC | Facility: CLINIC | Age: 28
End: 2023-12-14
Payer: COMMERCIAL

## 2023-12-14 VITALS — HEIGHT: 66 IN | WEIGHT: 206 LBS | BODY MASS INDEX: 33.11 KG/M2

## 2023-12-14 DIAGNOSIS — R63.5 WEIGHT GAIN: ICD-10-CM

## 2023-12-14 DIAGNOSIS — F43.23 ADJUSTMENT DISORDER WITH MIXED ANXIETY AND DEPRESSED MOOD: ICD-10-CM

## 2023-12-14 DIAGNOSIS — E05.90 HYPERTHYROIDISM: Primary | ICD-10-CM

## 2023-12-14 DIAGNOSIS — N91.2 AMENORRHEA: ICD-10-CM

## 2023-12-14 DIAGNOSIS — R10.2 PELVIC PAIN: ICD-10-CM

## 2023-12-14 PROCEDURE — 99214 OFFICE O/P EST MOD 30 MIN: CPT | Performed by: NURSE PRACTITIONER

## 2023-12-14 RX ORDER — ESCITALOPRAM OXALATE 5 MG/5ML
SOLUTION ORAL
Qty: 240 ML | Refills: 1 | Status: SHIPPED | OUTPATIENT
Start: 2023-12-14

## 2024-02-19 DIAGNOSIS — Z13.220 SCREENING FOR LIPID DISORDERS: ICD-10-CM

## 2024-02-19 DIAGNOSIS — E05.90 HYPERTHYROIDISM: Primary | ICD-10-CM

## 2024-02-19 DIAGNOSIS — Z00.00 ENCOUNTER FOR ANNUAL GENERAL MEDICAL EXAMINATION WITHOUT ABNORMAL FINDINGS IN ADULT: ICD-10-CM

## 2024-02-19 DIAGNOSIS — Z13.6 SCREENING FOR HYPERTENSION: ICD-10-CM

## 2024-04-09 ENCOUNTER — OFFICE VISIT (OUTPATIENT)
Dept: FAMILY MEDICINE CLINIC | Facility: CLINIC | Age: 29
End: 2024-04-09
Payer: COMMERCIAL

## 2024-04-09 ENCOUNTER — APPOINTMENT (OUTPATIENT)
Dept: LAB | Facility: CLINIC | Age: 29
End: 2024-04-09
Payer: COMMERCIAL

## 2024-04-09 VITALS
OXYGEN SATURATION: 99 % | DIASTOLIC BLOOD PRESSURE: 82 MMHG | HEART RATE: 76 BPM | SYSTOLIC BLOOD PRESSURE: 122 MMHG | RESPIRATION RATE: 14 BRPM | TEMPERATURE: 98 F | WEIGHT: 201 LBS | HEIGHT: 66 IN | BODY MASS INDEX: 32.3 KG/M2

## 2024-04-09 DIAGNOSIS — F43.23 ADJUSTMENT DISORDER WITH MIXED ANXIETY AND DEPRESSED MOOD: Primary | ICD-10-CM

## 2024-04-09 DIAGNOSIS — L70.0 ACNE VULGARIS: ICD-10-CM

## 2024-04-09 DIAGNOSIS — E05.90 PRETIBIAL MYXEDEMA: Primary | ICD-10-CM

## 2024-04-09 DIAGNOSIS — Z00.00 ROUTINE GENERAL MEDICAL EXAMINATION AT A HEALTH CARE FACILITY: ICD-10-CM

## 2024-04-09 LAB
ALBUMIN SERPL BCP-MCNC: 4.2 G/DL (ref 3.5–5)
ALP SERPL-CCNC: 68 U/L (ref 34–104)
ALT SERPL W P-5'-P-CCNC: 13 U/L (ref 7–52)
ANION GAP SERPL CALCULATED.3IONS-SCNC: 10 MMOL/L (ref 4–13)
AST SERPL W P-5'-P-CCNC: 14 U/L (ref 13–39)
BASOPHILS # BLD AUTO: 0.05 THOUSANDS/ÂΜL (ref 0–0.1)
BASOPHILS NFR BLD AUTO: 1 % (ref 0–1)
BILIRUB SERPL-MCNC: 0.54 MG/DL (ref 0.2–1)
BUN SERPL-MCNC: 11 MG/DL (ref 5–25)
CALCIUM SERPL-MCNC: 9.2 MG/DL (ref 8.4–10.2)
CHLORIDE SERPL-SCNC: 102 MMOL/L (ref 96–108)
CHOLEST SERPL-MCNC: 181 MG/DL
CO2 SERPL-SCNC: 25 MMOL/L (ref 21–32)
CREAT SERPL-MCNC: 0.85 MG/DL (ref 0.6–1.3)
EOSINOPHIL # BLD AUTO: 0.12 THOUSAND/ÂΜL (ref 0–0.61)
EOSINOPHIL NFR BLD AUTO: 2 % (ref 0–6)
ERYTHROCYTE [DISTWIDTH] IN BLOOD BY AUTOMATED COUNT: 13.2 % (ref 11.6–15.1)
GFR SERPL CREATININE-BSD FRML MDRD: 92 ML/MIN/1.73SQ M
GLUCOSE P FAST SERPL-MCNC: 98 MG/DL (ref 65–99)
HCT VFR BLD AUTO: 44.9 % (ref 34.8–46.1)
HDLC SERPL-MCNC: 64 MG/DL
HGB BLD-MCNC: 14.6 G/DL (ref 11.5–15.4)
IMM GRANULOCYTES # BLD AUTO: 0.01 THOUSAND/UL (ref 0–0.2)
IMM GRANULOCYTES NFR BLD AUTO: 0 % (ref 0–2)
LDLC SERPL CALC-MCNC: 104 MG/DL (ref 0–100)
LYMPHOCYTES # BLD AUTO: 1.83 THOUSANDS/ÂΜL (ref 0.6–4.47)
LYMPHOCYTES NFR BLD AUTO: 33 % (ref 14–44)
MCH RBC QN AUTO: 28.6 PG (ref 26.8–34.3)
MCHC RBC AUTO-ENTMCNC: 32.5 G/DL (ref 31.4–37.4)
MCV RBC AUTO: 88 FL (ref 82–98)
MONOCYTES # BLD AUTO: 0.51 THOUSAND/ÂΜL (ref 0.17–1.22)
MONOCYTES NFR BLD AUTO: 9 % (ref 4–12)
NEUTROPHILS # BLD AUTO: 2.97 THOUSANDS/ÂΜL (ref 1.85–7.62)
NEUTS SEG NFR BLD AUTO: 55 % (ref 43–75)
NONHDLC SERPL-MCNC: 117 MG/DL
NRBC BLD AUTO-RTO: 0 /100 WBCS
PLATELET # BLD AUTO: 359 THOUSANDS/UL (ref 149–390)
PMV BLD AUTO: 9.6 FL (ref 8.9–12.7)
POTASSIUM SERPL-SCNC: 4.4 MMOL/L (ref 3.5–5.3)
PROT SERPL-MCNC: 7.2 G/DL (ref 6.4–8.4)
RBC # BLD AUTO: 5.1 MILLION/UL (ref 3.81–5.12)
SODIUM SERPL-SCNC: 137 MMOL/L (ref 135–147)
T3FREE SERPL-MCNC: 3.52 PG/ML (ref 2.5–3.9)
T4 FREE SERPL-MCNC: 0.63 NG/DL (ref 0.61–1.12)
TRIGL SERPL-MCNC: 64 MG/DL
TSH SERPL DL<=0.05 MIU/L-ACNC: 3.28 UIU/ML (ref 0.45–4.5)
WBC # BLD AUTO: 5.49 THOUSAND/UL (ref 4.31–10.16)

## 2024-04-09 PROCEDURE — 84439 ASSAY OF FREE THYROXINE: CPT

## 2024-04-09 PROCEDURE — 85025 COMPLETE CBC W/AUTO DIFF WBC: CPT

## 2024-04-09 PROCEDURE — 80053 COMPREHEN METABOLIC PANEL: CPT

## 2024-04-09 PROCEDURE — 84481 FREE ASSAY (FT-3): CPT

## 2024-04-09 PROCEDURE — 99214 OFFICE O/P EST MOD 30 MIN: CPT | Performed by: NURSE PRACTITIONER

## 2024-04-09 PROCEDURE — 84443 ASSAY THYROID STIM HORMONE: CPT

## 2024-04-09 PROCEDURE — 80061 LIPID PANEL: CPT

## 2024-04-09 PROCEDURE — 36415 COLL VENOUS BLD VENIPUNCTURE: CPT

## 2024-04-09 RX ORDER — METRONIDAZOLE 7.5 MG/G
GEL TOPICAL 2 TIMES DAILY
Qty: 45 G | Refills: 1 | Status: SHIPPED | OUTPATIENT
Start: 2024-04-09

## 2024-04-09 RX ORDER — PAROXETINE 10 MG/5ML
10 SUSPENSION ORAL EVERY MORNING
Qty: 250 ML | Refills: 0 | Status: SHIPPED | OUTPATIENT
Start: 2024-04-09

## 2024-04-09 NOTE — PROGRESS NOTES
"Assessment/Plan:    1. Adjustment disorder with mixed anxiety and depressed mood  -     PARoxetine (PAXIL) 10 mg/5 mL suspension; Take 5 mL (10 mg total) by mouth every morning    2. Acne vulgaris  -     metroNIDAZOLE (METROGEL) 0.75 % gel; Apply topically 2 (two) times a day            Patient Instructions   Vitamin D and Saffron supplement for mood    Return in about 2 weeks (around 4/23/2024) for Recheck.    Subjective:      Patient ID: Trina Darby is a 29 y.o. female.    Chief Complaint   Patient presents with    Follow-up     Pt here for anxiety and medication f/u       Trina is a 29 year old female with anxiety, depression who presents to the office for evaluation and management of anxiety. Pt reports that she felt as if she had more anxiety when on her medication than without it. Reports that she felt \"very strange\" when taking the medication. Reports that she felt \"foggy\" when on lexapro. Pt reports that she has tried sertraline in the past without significant relief. Pt does not usually take her xanax but has taken a few for her anxiety recently. Reports that her acne has been out of control. Reports that she has tried expensive face wash and creams. States that she does not see any improvement.         The following portions of the patient's history were reviewed and updated as appropriate: allergies, current medications, past family history, past medical history, past social history, past surgical history and problem list.    Review of Systems   Constitutional:  Negative for chills, fatigue and fever.   Respiratory:  Negative for shortness of breath.    Cardiovascular:  Negative for chest pain.   Skin:         Facial acne    Psychiatric/Behavioral:  Negative for dysphoric mood. The patient is nervous/anxious.          Current Outpatient Medications   Medication Sig Dispense Refill    albuterol (Ventolin HFA) 90 mcg/act inhaler Inhale 2 puffs every 4 (four) hours as needed for wheezing or shortness of " "breath 18 g 6    ALPRAZolam (XANAX) 0.25 mg tablet Take 1 tablet (0.25 mg total) by mouth daily at bedtime as needed for anxiety 15 tablet 0    Loratadine (ALAVERT PO) Take 10 mg by mouth daily at bedtime       metroNIDAZOLE (METROGEL) 0.75 % gel Apply topically 2 (two) times a day 45 g 1    PARoxetine (PAXIL) 10 mg/5 mL suspension Take 5 mL (10 mg total) by mouth every morning 250 mL 0     No current facility-administered medications for this visit.       Objective:    /82 (BP Location: Left arm, Patient Position: Sitting, Cuff Size: Large)   Pulse 76   Temp 98 °F (36.7 °C) (Temporal)   Resp 14   Ht 5' 6\" (1.676 m)   Wt 91.2 kg (201 lb)   LMP 03/29/2024 (Approximate)   SpO2 99%   BMI 32.44 kg/m²        Physical Exam  Vitals reviewed.   Constitutional:       General: She is not in acute distress.     Appearance: She is well-developed. She is not diaphoretic.   HENT:      Head: Normocephalic and atraumatic.   Eyes:      General: Lids are normal.         Right eye: No discharge.         Left eye: No discharge.      Conjunctiva/sclera: Conjunctivae normal.   Neck:      Thyroid: No thyromegaly.   Cardiovascular:      Rate and Rhythm: Normal rate and regular rhythm.      Heart sounds: Normal heart sounds.   Pulmonary:      Effort: Pulmonary effort is normal. No respiratory distress.      Breath sounds: Normal breath sounds. No decreased breath sounds, wheezing, rhonchi or rales.   Musculoskeletal:      Cervical back: Normal range of motion and neck supple.   Lymphadenopathy:      Cervical: No cervical adenopathy.   Skin:     General: Skin is warm and dry.      Findings: No rash.   Neurological:      Mental Status: She is alert and oriented to person, place, and time.   Psychiatric:         Behavior: Behavior normal.         Thought Content: Thought content normal.         Judgment: Judgment normal.                ALYSSA Black  "

## 2024-04-25 ENCOUNTER — TELEPHONE (OUTPATIENT)
Dept: OTHER | Facility: OTHER | Age: 29
End: 2024-04-25

## 2024-04-25 NOTE — TELEPHONE ENCOUNTER
Patient called to cancel upcoming appointment she has today at 6:20 pm. Patient mentioned she cancelled it 3x via text messages but she still got a reminder text to confirm. Please assist

## 2024-06-24 ENCOUNTER — TELEPHONE (OUTPATIENT)
Dept: FAMILY MEDICINE CLINIC | Facility: CLINIC | Age: 29
End: 2024-06-24

## 2024-06-24 NOTE — TELEPHONE ENCOUNTER
Trina scheduled an appt through my chart.  It does not say what the appt is for.      I left a message to see what she needed to be seen for and to make sure there was enough time for what she needs to be seen.

## 2024-06-27 ENCOUNTER — OFFICE VISIT (OUTPATIENT)
Dept: FAMILY MEDICINE CLINIC | Facility: CLINIC | Age: 29
End: 2024-06-27
Payer: COMMERCIAL

## 2024-06-27 VITALS
DIASTOLIC BLOOD PRESSURE: 90 MMHG | RESPIRATION RATE: 18 BRPM | BODY MASS INDEX: 31.5 KG/M2 | HEIGHT: 66 IN | WEIGHT: 196 LBS | HEART RATE: 79 BPM | TEMPERATURE: 97.6 F | SYSTOLIC BLOOD PRESSURE: 122 MMHG | OXYGEN SATURATION: 97 %

## 2024-06-27 DIAGNOSIS — G47.9 SLEEP DISTURBANCE: ICD-10-CM

## 2024-06-27 DIAGNOSIS — F41.9 ANXIETY: ICD-10-CM

## 2024-06-27 DIAGNOSIS — G43.011 INTRACTABLE MIGRAINE WITHOUT AURA AND WITH STATUS MIGRAINOSUS: Primary | ICD-10-CM

## 2024-06-27 DIAGNOSIS — F43.21 GRIEF REACTION: ICD-10-CM

## 2024-06-27 PROCEDURE — 99214 OFFICE O/P EST MOD 30 MIN: CPT | Performed by: NURSE PRACTITIONER

## 2024-06-27 RX ORDER — SUMATRIPTAN 5 MG/1
1 SPRAY NASAL ONCE AS NEEDED
Qty: 6 EACH | Refills: 2 | Status: SHIPPED | OUTPATIENT
Start: 2024-06-27

## 2024-06-27 RX ORDER — ALPRAZOLAM 0.25 MG/1
0.25 TABLET ORAL
Qty: 15 TABLET | Refills: 0 | Status: SHIPPED | OUTPATIENT
Start: 2024-06-27

## 2024-06-27 NOTE — PROGRESS NOTES
Assessment/Plan:    1. Intractable migraine without aura and with status migrainosus  -     SUMAtriptan (IMITREX) 5 MG/ACT nasal spray; 1 spray (5 mg total) into each nostril once as needed for migraine for up to 36 doses ; if headache returns/persists, may repeat dose once after 2 hours    2. Grief reaction  -     ALPRAZolam (XANAX) 0.25 mg tablet; Take 1 tablet (0.25 mg total) by mouth daily at bedtime as needed for anxiety    3. Sleep disturbance  -     ALPRAZolam (XANAX) 0.25 mg tablet; Take 1 tablet (0.25 mg total) by mouth daily at bedtime as needed for anxiety    4. Anxiety  -     ALPRAZolam (XANAX) 0.25 mg tablet; Take 1 tablet (0.25 mg total) by mouth daily at bedtime as needed for anxiety          Return in about 2 weeks (around 7/11/2024) for Recheck.    Subjective:      Patient ID: Trina Darby is a 29 y.o. female.    Chief Complaint   Patient presents with    Migraine     Pt here for ongoing migraine for 5 days. Also has fatigue.       Migraine  This is a recurrent problem. The current episode started in the past 7 days. The pain is present in the parietal, frontal and temporal. The pain quality is not similar to prior headaches. The quality of the pain is described as aching (pressure). The pain is severe. Pertinent negatives include no coughing, eye pain, eye redness, fever, nausea or vomiting. Exacerbated by: sitting upright. Past treatments include acetaminophen. The treatment provided no relief. Her past medical history is significant for migraine headaches.       The following portions of the patient's history were reviewed and updated as appropriate: allergies, current medications, past family history, past medical history, past social history, past surgical history and problem list.    Review of Systems   Constitutional:  Positive for fatigue. Negative for chills and fever.   Eyes:  Negative for pain and redness.   Respiratory:  Negative for cough, chest tightness and shortness of breath.   "  Cardiovascular:  Negative for chest pain.   Gastrointestinal:  Negative for nausea and vomiting.   Neurological:  Positive for headaches.         Current Outpatient Medications   Medication Sig Dispense Refill    albuterol (Ventolin HFA) 90 mcg/act inhaler Inhale 2 puffs every 4 (four) hours as needed for wheezing or shortness of breath 18 g 6    ALPRAZolam (XANAX) 0.25 mg tablet Take 1 tablet (0.25 mg total) by mouth daily at bedtime as needed for anxiety 15 tablet 0    Loratadine (ALAVERT PO) Take 10 mg by mouth daily at bedtime       SUMAtriptan (IMITREX) 5 MG/ACT nasal spray 1 spray (5 mg total) into each nostril once as needed for migraine for up to 36 doses ; if headache returns/persists, may repeat dose once after 2 hours 6 each 2    metroNIDAZOLE (METROGEL) 0.75 % gel Apply topically 2 (two) times a day (Patient not taking: Reported on 6/27/2024) 45 g 1     No current facility-administered medications for this visit.       Objective:    /90 (BP Location: Left arm, Patient Position: Sitting, Cuff Size: Large)   Pulse 79   Temp 97.6 °F (36.4 °C) (Temporal)   Resp 18   Ht 5' 6\" (1.676 m)   Wt 88.9 kg (196 lb)   LMP 06/17/2024 (Exact Date)   SpO2 97%   BMI 31.64 kg/m²        Physical Exam  Vitals reviewed.   Constitutional:       General: She is not in acute distress.     Appearance: She is well-developed. She is not diaphoretic.   HENT:      Head: Normocephalic and atraumatic.   Eyes:      General: Lids are normal.         Right eye: No discharge.         Left eye: No discharge.      Extraocular Movements: Extraocular movements intact.      Conjunctiva/sclera: Conjunctivae normal.      Pupils: Pupils are equal, round, and reactive to light.      Funduscopic exam:     Right eye: Red reflex present.         Left eye: Red reflex present.  Neck:      Thyroid: No thyromegaly.   Cardiovascular:      Rate and Rhythm: Normal rate and regular rhythm.      Heart sounds: Normal heart sounds.   Pulmonary: "      Effort: Pulmonary effort is normal. No respiratory distress.      Breath sounds: Normal breath sounds. No decreased breath sounds, wheezing, rhonchi or rales.   Musculoskeletal:      Cervical back: Normal range of motion and neck supple.   Lymphadenopathy:      Cervical: No cervical adenopathy.   Skin:     General: Skin is warm and dry.      Findings: No rash.   Neurological:      General: No focal deficit present.      Mental Status: She is alert and oriented to person, place, and time.      Motor: Motor function is intact.      Deep Tendon Reflexes: Reflexes are normal and symmetric.   Psychiatric:         Behavior: Behavior normal.         Thought Content: Thought content normal.         Judgment: Judgment normal.             ALYSSA Black

## 2024-07-26 ENCOUNTER — HOSPITAL ENCOUNTER (OUTPATIENT)
Dept: ULTRASOUND IMAGING | Facility: CLINIC | Age: 29
End: 2024-07-26
Payer: COMMERCIAL

## 2024-07-26 DIAGNOSIS — R92.8 ABNORMAL FINDINGS ON DIAGNOSTIC IMAGING OF BREAST: ICD-10-CM

## 2024-07-26 PROCEDURE — 76642 ULTRASOUND BREAST LIMITED: CPT

## 2024-09-09 ENCOUNTER — TELEPHONE (OUTPATIENT)
Dept: OTOLARYNGOLOGY | Facility: CLINIC | Age: 29
End: 2024-09-09

## 2024-09-09 DIAGNOSIS — E04.1 THYROID NODULE: Primary | ICD-10-CM

## 2024-09-09 NOTE — TELEPHONE ENCOUNTER
----- Message from Leanne PONCE sent at 9/6/2024  1:28 PM EDT -----      Dear Provider,    Our records indicate that your patient was recommended to have a follow up exam based on a prior imaging study.    This letter is to notify you that your patient has not returned to a [facility] site for the recommended study. Enclosed, please find a copy of the patient report for your review. The Radiologist's recommendation can be found in the impression section at the bottom of the report.     If you have further questions, please feel free to contact us at [phone]. If you need assistance in making a scheduled appointment for your patient, please contact Central Scheduling at [phone].    Thank You,      Dimitri Queen M.D.  , Department of Radiology

## 2024-09-25 ENCOUNTER — TELEPHONE (OUTPATIENT)
Age: 29
End: 2024-09-25

## 2024-09-25 NOTE — TELEPHONE ENCOUNTER
Received call from patient to [facility] new patient appt.    I offered patient the next available in 2025.    Patient declined

## 2024-10-03 ENCOUNTER — OFFICE VISIT (OUTPATIENT)
Dept: FAMILY MEDICINE CLINIC | Facility: CLINIC | Age: 29
End: 2024-10-03
Payer: COMMERCIAL

## 2024-10-03 ENCOUNTER — TELEPHONE (OUTPATIENT)
Age: 29
End: 2024-10-03

## 2024-10-03 VITALS
SYSTOLIC BLOOD PRESSURE: 120 MMHG | OXYGEN SATURATION: 99 % | BODY MASS INDEX: 32.47 KG/M2 | HEART RATE: 83 BPM | TEMPERATURE: 97.8 F | WEIGHT: 202 LBS | DIASTOLIC BLOOD PRESSURE: 76 MMHG | HEIGHT: 66 IN | RESPIRATION RATE: 16 BRPM

## 2024-10-03 DIAGNOSIS — L65.9 HAIR LOSS: Primary | ICD-10-CM

## 2024-10-03 DIAGNOSIS — M25.472 LEFT ANKLE SWELLING: ICD-10-CM

## 2024-10-03 DIAGNOSIS — E05.90 HYPERTHYROIDISM: ICD-10-CM

## 2024-10-03 DIAGNOSIS — E04.1 THYROID NODULE: ICD-10-CM

## 2024-10-03 DIAGNOSIS — M25.472 LEFT ANKLE SWELLING: Primary | ICD-10-CM

## 2024-10-03 PROCEDURE — 99214 OFFICE O/P EST MOD 30 MIN: CPT | Performed by: NURSE PRACTITIONER

## 2024-10-03 NOTE — TELEPHONE ENCOUNTER
PT's venous doppler order states its upper limb, and its supposed to be lower limb since its her ankle. PT requesting a correction, and to please send her a My Chart message to give her a heads up when order has been corrected.    Please advise    thankYou

## 2024-10-03 NOTE — PROGRESS NOTES
Assessment/Plan:    1. Hair loss  -     TSH, 3rd generation; Future  -     T4, free; Future  -     TSH, 3rd generation  -     T4, free  2. Left ankle swelling  -     TSH, 3rd generation; Future  -     T4, free; Future  -     TSH, 3rd generation  -     T4, free  -     VAS upper limb venous duplex scan, unilateral/limited; Future; Expected date: 10/03/2024  3. Thyroid nodule  Assessment & Plan:  Pt is scheduled for thyroid US for evaluation of thyroid nodule.   4. Hyperthyroidism          Return if symptoms worsen or fail to improve.    Subjective:      Patient ID: Trina Darby is a 29 y.o. female.    Chief Complaint   Patient presents with    Hair/Scalp Problem     Pt states she has new onset of noticeable hair loss that started a few weeks ago RH       Trina is a 29 year old female who presents to the office for evaluation and management of hair loss. Reports that she has noticed excessive hair loss over the last month and does not that she has areas of hair thinness that the front of her scalp bilaterally. Reports BL ankle swelling, left greater than right. This has occurred in the past but resolved spontaneously over time. Now it has returned. Reports that she felt nauseas today and had a hot flash in the her car. Denies fever, chills, chest pain, shortness of breath. Pt does have a history of hyperthyroidism noted on labs and has had follow up with ENT and endo in the past.         The following portions of the patient's history were reviewed and updated as appropriate: allergies, current medications, past family history, past medical history, past social history, past surgical history and problem list.    Review of Systems   Constitutional:  Negative for chills, fatigue and fever.   HENT:          Hair loss   Respiratory:  Negative for shortness of breath.    Cardiovascular:  Positive for leg swelling. Negative for chest pain.   Gastrointestinal:  Positive for nausea. Negative for abdominal pain, diarrhea and  "vomiting.         Current Outpatient Medications   Medication Sig Dispense Refill    albuterol (Ventolin HFA) 90 mcg/act inhaler Inhale 2 puffs every 4 (four) hours as needed for wheezing or shortness of breath 18 g 6    ALPRAZolam (XANAX) 0.25 mg tablet Take 1 tablet (0.25 mg total) by mouth daily at bedtime as needed for anxiety 15 tablet 0    Loratadine (ALAVERT PO) Take 10 mg by mouth daily at bedtime       SUMAtriptan (IMITREX) 5 MG/ACT nasal spray 1 spray (5 mg total) into each nostril once as needed for migraine for up to 36 doses ; if headache returns/persists, may repeat dose once after 2 hours 6 each 2    metroNIDAZOLE (METROGEL) 0.75 % gel Apply topically 2 (two) times a day (Patient not taking: Reported on 6/27/2024) 45 g 1     No current facility-administered medications for this visit.       Objective:    /76 (BP Location: Left arm, Patient Position: Sitting, Cuff Size: Large)   Pulse 83   Temp 97.8 °F (36.6 °C) (Temporal)   Resp 16   Ht 5' 6\" (1.676 m)   Wt 91.6 kg (202 lb)   LMP 09/29/2024 (Exact Date)   SpO2 99%   BMI 32.60 kg/m²        Physical Exam  Vitals reviewed.   Constitutional:       Appearance: Normal appearance.   HENT:      Head: Normocephalic and atraumatic.      Comments: Mild hair thinning noted to left and right lateral hair line  Cardiovascular:      Rate and Rhythm: Normal rate and regular rhythm.      Heart sounds: Normal heart sounds.      Comments: Left ankle edema   Pulmonary:      Effort: Pulmonary effort is normal.      Breath sounds: Normal breath sounds.   Musculoskeletal:      Right lower leg: No edema.   Neurological:      Mental Status: She is alert and oriented to person, place, and time.   Psychiatric:         Mood and Affect: Mood normal.                ALYSSA Black  "

## 2024-10-11 ENCOUNTER — HOSPITAL ENCOUNTER (OUTPATIENT)
Dept: NON INVASIVE DIAGNOSTICS | Facility: CLINIC | Age: 29
Discharge: HOME | End: 2024-10-11
Payer: COMMERCIAL

## 2024-10-11 ENCOUNTER — TELEPHONE (OUTPATIENT)
Dept: FAMILY MEDICINE CLINIC | Facility: CLINIC | Age: 29
End: 2024-10-11

## 2024-10-11 ENCOUNTER — HOSPITAL ENCOUNTER (OUTPATIENT)
Dept: RADIOLOGY | Facility: HOSPITAL | Age: 29
Discharge: HOME | End: 2024-10-11
Payer: COMMERCIAL

## 2024-10-11 ENCOUNTER — APPOINTMENT (OUTPATIENT)
Dept: LAB | Facility: CLINIC | Age: 29
End: 2024-10-11
Payer: COMMERCIAL

## 2024-10-11 DIAGNOSIS — L65.9 BALDNESS: Primary | ICD-10-CM

## 2024-10-11 DIAGNOSIS — E04.1 THYROID NODULE: ICD-10-CM

## 2024-10-11 DIAGNOSIS — M25.472 LEFT ANKLE SWELLING: ICD-10-CM

## 2024-10-11 DIAGNOSIS — M25.471 SWOLLEN ANKLES: ICD-10-CM

## 2024-10-11 DIAGNOSIS — M25.472 SWOLLEN ANKLES: ICD-10-CM

## 2024-10-11 LAB
T4 FREE SERPL-MCNC: 0.77 NG/DL (ref 0.61–1.12)
TSH SERPL DL<=0.05 MIU/L-ACNC: 3.18 UIU/ML (ref 0.45–4.5)

## 2024-10-11 PROCEDURE — 93971 EXTREMITY STUDY: CPT

## 2024-10-11 PROCEDURE — 36415 COLL VENOUS BLD VENIPUNCTURE: CPT

## 2024-10-11 PROCEDURE — 84443 ASSAY THYROID STIM HORMONE: CPT

## 2024-10-11 PROCEDURE — 76536 US EXAM OF HEAD AND NECK: CPT

## 2024-10-11 PROCEDURE — 93971 EXTREMITY STUDY: CPT | Performed by: SURGERY

## 2024-10-11 PROCEDURE — 84439 ASSAY OF FREE THYROXINE: CPT

## 2024-10-11 NOTE — TELEPHONE ENCOUNTER
[facility] called and wanted to check to see what Sheila was looking for with the VAS of left ankle.  DVT or Insufficient blood flow    Per Verbal from Sheila - Insufficient blood flow.    SL asked about the the other order that was in the chart.  Per verbal with Sheila, she stated that should be cancelled.

## 2025-01-10 ENCOUNTER — OFFICE VISIT (OUTPATIENT)
Dept: FAMILY MEDICINE CLINIC | Facility: CLINIC | Age: 30
End: 2025-01-10
Payer: COMMERCIAL

## 2025-01-10 VITALS
TEMPERATURE: 97.7 F | WEIGHT: 209.4 LBS | HEIGHT: 65 IN | SYSTOLIC BLOOD PRESSURE: 118 MMHG | RESPIRATION RATE: 16 BRPM | DIASTOLIC BLOOD PRESSURE: 68 MMHG | BODY MASS INDEX: 34.89 KG/M2 | HEART RATE: 79 BPM | OXYGEN SATURATION: 98 %

## 2025-01-10 DIAGNOSIS — J45.991 COUGH VARIANT ASTHMA: ICD-10-CM

## 2025-01-10 DIAGNOSIS — J06.9 UPPER RESPIRATORY TRACT INFECTION, UNSPECIFIED TYPE: Primary | ICD-10-CM

## 2025-01-10 DIAGNOSIS — R05.3 CHRONIC COUGH: ICD-10-CM

## 2025-01-10 PROCEDURE — 99213 OFFICE O/P EST LOW 20 MIN: CPT | Performed by: STUDENT IN AN ORGANIZED HEALTH CARE EDUCATION/TRAINING PROGRAM

## 2025-01-10 RX ORDER — AZITHROMYCIN 200 MG/5ML
POWDER, FOR SUSPENSION ORAL
Qty: 37.7 ML | Refills: 0 | Status: SHIPPED | OUTPATIENT
Start: 2025-01-10 | End: 2025-01-15

## 2025-01-10 RX ORDER — ALBUTEROL SULFATE 90 UG/1
2 INHALANT RESPIRATORY (INHALATION) EVERY 4 HOURS PRN
Qty: 18 G | Refills: 6 | Status: SHIPPED | OUTPATIENT
Start: 2025-01-10

## 2025-01-10 NOTE — ASSESSMENT & PLAN NOTE
Orders:  •  albuterol (Ventolin HFA) 90 mcg/act inhaler; Inhale 2 puffs every 4 (four) hours as needed for wheezing or shortness of breath

## 2025-01-10 NOTE — PROGRESS NOTES
Name: Trina Darby      : 1995      MRN: [id number]  Encounter Provider: Laura Mchugh MD  Encounter Date: 1/10/2025   Encounter department: [facility]  :  Assessment & Plan  Chronic cough    Orders:  •  albuterol (Ventolin HFA) 90 mcg/act inhaler; Inhale 2 puffs every 4 (four) hours as needed for wheezing or shortness of breath    Cough variant asthma    Orders:  •  albuterol (Ventolin HFA) 90 mcg/act inhaler; Inhale 2 puffs every 4 (four) hours as needed for wheezing or shortness of breath    Upper respiratory tract infection, unspecified type    Orders:  •  azithromycin (ZITHROMAX) 200 mg/5 mL suspension; Take 12.5 mL (500 mg total) by mouth daily for 1 day, THEN 6.3 mL (250 mg total) daily for 4 days.     Increase fluid intake as tolerated  Antibiotic for full course  Flonase OTC 1-2 sprays each nostril daily PRN for postnasal drip  RTO in 1 week if symptoms persist or worsen         History of Present Illness     Sore Throat   This is a new problem. The current episode started in the past 7 days. The problem has been gradually worsening. Neither side of throat is experiencing more pain than the other. There has been no fever. The fever has been present for Less than 1 day. The pain is at a severity of 2/10. The pain is mild. Associated symptoms include congestion, coughing, headaches, a hoarse voice and stridor. Pertinent negatives include no abdominal pain, diarrhea, drooling, ear discharge, ear pain, plugged ear sensation, neck pain, shortness of breath, swollen glands, trouble swallowing or vomiting.     Review of Systems   HENT:  Positive for congestion, hoarse voice and sore throat. Negative for drooling, ear discharge, ear pain and trouble swallowing.    Respiratory:  Positive for cough and stridor. Negative for shortness of breath.    Gastrointestinal:  Negative for abdominal pain, diarrhea and vomiting.   Musculoskeletal:  Negative for neck pain.   Neurological:   "Positive for headaches.       Objective   /68   Pulse 79   Temp 97.7 °F (36.5 °C)   Resp 16   Ht 5' 4.5\" (1.638 m)   Wt 95 kg (209 lb 6.4 oz)   LMP 12/10/2024 (Exact Date)   SpO2 98%   BMI 35.39 kg/m²      Physical Exam  Constitutional:       Appearance: She is well-developed.   HENT:      Head: Normocephalic and atraumatic.      Right Ear: Tympanic membrane and ear canal normal.      Left Ear: Tympanic membrane and ear canal normal.      Nose: Congestion and rhinorrhea present.   Cardiovascular:      Rate and Rhythm: Normal rate and regular rhythm.   Pulmonary:      Effort: Pulmonary effort is normal.      Breath sounds: Normal breath sounds.   Neurological:      General: No focal deficit present.      Mental Status: She is alert and oriented to person, place, and time.   Psychiatric:         Mood and Affect: Mood normal.         Behavior: Behavior normal.         "

## 2025-04-08 ENCOUNTER — RA CDI HCC (OUTPATIENT)
Dept: OTHER | Facility: HOSPITAL | Age: 30
End: 2025-04-08

## 2025-04-10 ENCOUNTER — OFFICE VISIT (OUTPATIENT)
Dept: FAMILY MEDICINE CLINIC | Facility: CLINIC | Age: 30
End: 2025-04-10
Payer: COMMERCIAL

## 2025-04-10 VITALS
TEMPERATURE: 97.9 F | DIASTOLIC BLOOD PRESSURE: 64 MMHG | SYSTOLIC BLOOD PRESSURE: 130 MMHG | BODY MASS INDEX: 35.16 KG/M2 | OXYGEN SATURATION: 98 % | HEART RATE: 76 BPM | HEIGHT: 65 IN | RESPIRATION RATE: 16 BRPM | WEIGHT: 211 LBS

## 2025-04-10 DIAGNOSIS — F43.23 ADJUSTMENT DISORDER WITH MIXED ANXIETY AND DEPRESSED MOOD: Primary | ICD-10-CM

## 2025-04-10 DIAGNOSIS — L65.9 HAIR LOSS: ICD-10-CM

## 2025-04-10 PROCEDURE — 99214 OFFICE O/P EST MOD 30 MIN: CPT | Performed by: FAMILY MEDICINE

## 2025-04-10 RX ORDER — BUPROPION HYDROCHLORIDE 100 MG/1
100 TABLET, EXTENDED RELEASE ORAL 2 TIMES DAILY
Qty: 60 TABLET | Refills: 1 | Status: SHIPPED | OUTPATIENT
Start: 2025-04-10 | End: 2025-04-11

## 2025-04-10 NOTE — PROGRESS NOTES
Name: Trina Darby      : 1995      MRN: [id number]  Encounter Provider: Jos Saini MD  Encounter Date: 4/10/2025   Encounter department: [facility]    Assessment & Plan  Adjustment disorder with mixed anxiety and depressed mood    HAS BEEN VERY ANXIOUS  HAS BEEN UNDER A LOT OF STRESS  IS SEEING A THERAPIST    DISCUSSED THERAPEUTIC OPTIONS    Orders:  •  buPROPion (Wellbutrin SR) 100 mg 12 hr tablet; Take 1 tablet (100 mg total) by mouth 2 (two) times a day    Hair loss    NOTICED DIFFUSE HAIR LOSS SINCE   NO BALD SPOTS  HAS BEEN UNDER A LOT OF STRESS  NO RECENT ILLNESS  NO RECENT SURGERY OR PREGNANCY    Orders:  •  CBC and differential; Future  •  Comprehensive metabolic panel; Future  •  TSH, 3rd generation; Future  •  DHEA-sulfate; Future  •  Testosterone; Future  •  Sedimentation rate, automated; Future  •  C-reactive protein; Future         History of Present Illness     HAIR LOSS AND ANXIETY      Review of Systems   Constitutional:  Negative for chills, fatigue and fever.   HENT:  Negative for congestion, ear discharge, ear pain, mouth sores, postnasal drip, sore throat and trouble swallowing.    Eyes:  Negative for pain, discharge and visual disturbance.   Respiratory:  Negative for cough, shortness of breath and wheezing.    Cardiovascular:  Negative for chest pain, palpitations and leg swelling.   Gastrointestinal:  Negative for abdominal distention, abdominal pain, blood in stool, diarrhea and nausea.   Endocrine: Negative for polydipsia, polyphagia and polyuria.   Genitourinary:  Negative for dysuria, frequency, hematuria and urgency.   Musculoskeletal:  Negative for arthralgias, gait problem and joint swelling.   Skin:  Negative for pallor and rash.   Neurological:  Negative for dizziness, syncope, speech difficulty, weakness, light-headedness, numbness and headaches.   Hematological:  Negative for adenopathy.   Psychiatric/Behavioral:   Positive for dysphoric mood. Negative for behavioral problems, confusion and sleep disturbance. The patient is nervous/anxious.      Past Medical History:   Diagnosis Date   • Asthma 2021   • Hyperthyroidism    • Nasal congestion 8/2018   • Wears glasses      Past Surgical History:   Procedure Laterality Date   • FRACTURE SURGERY Right 2004    hip   • HIP PINNING Right     Right hip pinning for fracture of growth plate at age 9     Family History   Problem Relation Age of Onset   • Cancer Mother         Passed away 6-12-21   • Cervical cancer Mother    • Diabetes Father    • Diabetes unspecified Father    • Asthma Sister    • Hypothyroidism Sister    • Infertility Sister    • No Known Problems Sister    • Throat cancer Maternal Grandfather    • Ovarian cancer Maternal Aunt    • Diabetes Maternal Aunt    • Throat cancer Maternal Uncle    • Testicular cancer Cousin      Social History     Tobacco Use   • Smoking status: Never     Passive exposure: Never   • Smokeless tobacco: Never   Vaping Use   • Vaping status: Never Used   Substance and Sexual Activity   • Alcohol use: No   • Drug use: Never   • Sexual activity: Not Currently     Partners: Male     Birth control/protection: None     Current Outpatient Medications on File Prior to Visit   Medication Sig   • albuterol (Ventolin HFA) 90 mcg/act inhaler Inhale 2 puffs every 4 (four) hours as needed for wheezing or shortness of breath   • Loratadine (ALAVERT PO) Take 10 mg by mouth daily at bedtime    • [DISCONTINUED] ALPRAZolam (XANAX) 0.25 mg tablet Take 1 tablet (0.25 mg total) by mouth daily at bedtime as needed for anxiety (Patient not taking: Reported on 4/10/2025)     Allergies   Allergen Reactions   • Bactrim [Sulfamethoxazole-Trimethoprim] Throat Swelling   • Toradol [Ketorolac Tromethamine] Throat Swelling   • Latex Rash   • Penicillins Hives   • Singulair [Montelukast] Headache     Immunization History   Administered Date(s) Administered   • COVID-19 MODERNA  "VACC 0.5 ML IM 01/04/2022, 02/04/2022   • HPV9 10/14/2020, 10/14/2020, 01/13/2021, 01/13/2021, 05/07/2021, 05/07/2021   • Influenza, injectable, quadrivalent, preservative free 0.5 mL 10/20/2020, 10/22/2021, 10/06/2023   • Pneumococcal Conjugate Vaccine 20-valent (Pcv20), Polysace 03/21/2023   • Tdap 10/20/2020     Objective   /64   Pulse 76   Temp 97.9 °F (36.6 °C)   Resp 16   Ht 5' 4.5\" (1.638 m)   Wt 95.7 kg (211 lb)   LMP 03/15/2025 (Exact Date)   SpO2 98%   BMI 35.66 kg/m²     Physical Exam  Constitutional:       General: She is not in acute distress.     Appearance: Normal appearance. She is well-developed. She is obese. She is not ill-appearing.   HENT:      Head: Normocephalic and atraumatic.      Nose: Nose normal.      Mouth/Throat:      Mouth: Mucous membranes are moist.   Eyes:      General:         Right eye: No discharge.         Left eye: No discharge.      Conjunctiva/sclera: Conjunctivae normal.      Pupils: Pupils are equal, round, and reactive to light.   Neck:      Thyroid: No thyromegaly.   Cardiovascular:      Rate and Rhythm: Normal rate and regular rhythm.      Heart sounds: Normal heart sounds. No murmur heard.  Pulmonary:      Effort: Pulmonary effort is normal. No respiratory distress.      Breath sounds: Normal breath sounds. No wheezing or rales.   Abdominal:      General: Bowel sounds are normal.      Palpations: Abdomen is soft.      Tenderness: There is no abdominal tenderness.   Musculoskeletal:         General: No tenderness. Normal range of motion.      Cervical back: Normal range of motion and neck supple.   Lymphadenopathy:      Cervical: No cervical adenopathy.   Skin:     General: Skin is warm and dry.      Findings: No erythema or rash.   Neurological:      General: No focal deficit present.      Mental Status: She is alert and oriented to person, place, and time.   Psychiatric:         Behavior: Behavior normal.         Thought Content: Thought content normal.   "       Judgment: Judgment normal.

## 2025-04-10 NOTE — ASSESSMENT & PLAN NOTE
HAS BEEN VERY ANXIOUS  HAS BEEN UNDER A LOT OF STRESS  IS SEEING A THERAPIST    DISCUSSED THERAPEUTIC OPTIONS    Orders:  •  buPROPion (Wellbutrin SR) 100 mg 12 hr tablet; Take 1 tablet (100 mg total) by mouth 2 (two) times a day

## 2025-04-10 NOTE — PATIENT INSTRUCTIONS
HYDRATION  MULTIVITAMIN / BIOTIN  BW  DERM CONSULT PENDING    CONTINUE THERAPY  TRIAL OF BUPROPION    RV 1 M  SOONER PRN

## 2025-04-11 ENCOUNTER — TELEPHONE (OUTPATIENT)
Age: 30
End: 2025-04-11

## 2025-04-11 DIAGNOSIS — F43.23 ADJUSTMENT DISORDER WITH MIXED ANXIETY AND DEPRESSED MOOD: Primary | ICD-10-CM

## 2025-04-11 RX ORDER — BUPROPION HYDROCHLORIDE 100 MG/1
100 TABLET ORAL 2 TIMES DAILY
Qty: 60 TABLET | Refills: 1 | Status: SHIPPED | OUTPATIENT
Start: 2025-04-11

## 2025-04-11 NOTE — TELEPHONE ENCOUNTER
Patient called in to state that she was prescribed bupropion yesterday and that she is not able to swallow a large pill and she is not able to crush it.  She spoke with the pharmacy and they told her to check with her doctor for something else..  Is there a pill that can be given to her that she can crush or a liquid form?  Please advise.

## 2025-04-11 NOTE — TELEPHONE ENCOUNTER
Pharmacy called in, questioning Bupropion change.    Advised per Dr. Gutierrez's note, that the patient needed a crushable medication.    LEONID

## 2025-04-12 ENCOUNTER — APPOINTMENT (OUTPATIENT)
Dept: LAB | Facility: HOSPITAL | Age: 30
End: 2025-04-12
Attending: FAMILY MEDICINE
Payer: COMMERCIAL

## 2025-04-12 DIAGNOSIS — L65.9 LOSS OF HAIR: Primary | ICD-10-CM

## 2025-04-12 LAB
ALBUMIN SERPL BCG-MCNC: 4.2 G/DL (ref 3.5–5)
ALP SERPL-CCNC: 70 U/L (ref 34–104)
ALT SERPL W P-5'-P-CCNC: 16 U/L (ref 7–52)
ANION GAP SERPL CALCULATED.3IONS-SCNC: 11 MMOL/L (ref 4–13)
AST SERPL W P-5'-P-CCNC: 14 U/L (ref 13–39)
BASOPHILS # BLD AUTO: 0.06 THOUSANDS/ÂΜL (ref 0–0.1)
BASOPHILS NFR BLD AUTO: 1 % (ref 0–1)
BILIRUB SERPL-MCNC: 0.49 MG/DL (ref 0.2–1)
BUN SERPL-MCNC: 10 MG/DL (ref 5–25)
CALCIUM SERPL-MCNC: 9 MG/DL (ref 8.4–10.2)
CHLORIDE SERPL-SCNC: 104 MMOL/L (ref 96–108)
CO2 SERPL-SCNC: 22 MMOL/L (ref 21–32)
CREAT SERPL-MCNC: 0.77 MG/DL (ref 0.6–1.3)
CRP SERPL QL: 6.6 MG/L
EOSINOPHIL # BLD AUTO: 0.12 THOUSAND/ÂΜL (ref 0–0.61)
EOSINOPHIL NFR BLD AUTO: 2 % (ref 0–6)
ERYTHROCYTE [DISTWIDTH] IN BLOOD BY AUTOMATED COUNT: 12.6 % (ref 11.6–15.1)
ERYTHROCYTE [SEDIMENTATION RATE] IN BLOOD: 23 MM/HOUR (ref 0–19)
GFR SERPL CREATININE-BSD FRML MDRD: 103 ML/MIN/1.73SQ M
GLUCOSE P FAST SERPL-MCNC: 97 MG/DL (ref 65–99)
HCT VFR BLD AUTO: 41.8 % (ref 34.8–46.1)
HGB BLD-MCNC: 13.9 G/DL (ref 11.5–15.4)
IMM GRANULOCYTES # BLD AUTO: 0.01 THOUSAND/UL (ref 0–0.2)
IMM GRANULOCYTES NFR BLD AUTO: 0 % (ref 0–2)
LYMPHOCYTES # BLD AUTO: 2.08 THOUSANDS/ÂΜL (ref 0.6–4.47)
LYMPHOCYTES NFR BLD AUTO: 37 % (ref 14–44)
MCH RBC QN AUTO: 28.8 PG (ref 26.8–34.3)
MCHC RBC AUTO-ENTMCNC: 33.3 G/DL (ref 31.4–37.4)
MCV RBC AUTO: 87 FL (ref 82–98)
MONOCYTES # BLD AUTO: 0.56 THOUSAND/ÂΜL (ref 0.17–1.22)
MONOCYTES NFR BLD AUTO: 10 % (ref 4–12)
NEUTROPHILS # BLD AUTO: 2.84 THOUSANDS/ÂΜL (ref 1.85–7.62)
NEUTS SEG NFR BLD AUTO: 50 % (ref 43–75)
NRBC BLD AUTO-RTO: 0 /100 WBCS
PLATELET # BLD AUTO: 309 THOUSANDS/UL (ref 149–390)
PMV BLD AUTO: 9 FL (ref 8.9–12.7)
POTASSIUM SERPL-SCNC: 4 MMOL/L (ref 3.5–5.3)
PROT SERPL-MCNC: 7.2 G/DL (ref 6.4–8.4)
RBC # BLD AUTO: 4.83 MILLION/UL (ref 3.81–5.12)
SODIUM SERPL-SCNC: 137 MMOL/L (ref 135–147)
TESTOST SERPL-MSCNC: 11 NG/DL (ref 0–75)
TSH SERPL DL<=0.05 MIU/L-ACNC: 1.36 UIU/ML (ref 0.45–4.5)
WBC # BLD AUTO: 5.67 THOUSAND/UL (ref 4.31–10.16)

## 2025-04-12 PROCEDURE — 85652 RBC SED RATE AUTOMATED: CPT

## 2025-04-12 PROCEDURE — 84443 ASSAY THYROID STIM HORMONE: CPT

## 2025-04-12 PROCEDURE — 36415 COLL VENOUS BLD VENIPUNCTURE: CPT

## 2025-04-12 PROCEDURE — 84403 ASSAY OF TOTAL TESTOSTERONE: CPT

## 2025-04-12 PROCEDURE — 82627 DEHYDROEPIANDROSTERONE: CPT

## 2025-04-12 PROCEDURE — 85025 COMPLETE CBC W/AUTO DIFF WBC: CPT

## 2025-04-12 PROCEDURE — 86140 C-REACTIVE PROTEIN: CPT

## 2025-04-12 PROCEDURE — 80053 COMPREHEN METABOLIC PANEL: CPT

## 2025-04-13 LAB — DHEA-S SERPL-MCNC: 78.6 UG/DL (ref 84.8–378)

## 2025-04-16 ENCOUNTER — TELEPHONE (OUTPATIENT)
Age: 30
End: 2025-04-16

## 2025-04-16 NOTE — TELEPHONE ENCOUNTER
Trina is calling to see if her lab results are in. Results have been received. Please advise and return call to Trina with results.     Thank you!

## 2025-04-21 NOTE — TELEPHONE ENCOUNTER
PLEASE CALL [facility]    REVIEWED THE BLOOD WORK    DID SHOW SHE HAS SOME INFLAMMATION GOING ON    WAS SHE ABLE TO START THE BUPROPION?    [facility] HAVE AN OV IN THE NEXT 2-3 WEEKS TO DISCUSS BW - WE MAY HAVE TO RE CHECK IT    THANKS

## 2025-05-06 ENCOUNTER — OFFICE VISIT (OUTPATIENT)
Dept: FAMILY MEDICINE CLINIC | Facility: CLINIC | Age: 30
End: 2025-05-06
Payer: COMMERCIAL

## 2025-05-06 VITALS
WEIGHT: 213 LBS | HEIGHT: 65 IN | DIASTOLIC BLOOD PRESSURE: 70 MMHG | HEART RATE: 120 BPM | TEMPERATURE: 98.2 F | SYSTOLIC BLOOD PRESSURE: 110 MMHG | BODY MASS INDEX: 35.49 KG/M2 | OXYGEN SATURATION: 98 % | RESPIRATION RATE: 20 BRPM

## 2025-05-06 DIAGNOSIS — J06.9 UPPER RESPIRATORY TRACT INFECTION, UNSPECIFIED TYPE: Primary | ICD-10-CM

## 2025-05-06 PROCEDURE — 99213 OFFICE O/P EST LOW 20 MIN: CPT | Performed by: STUDENT IN AN ORGANIZED HEALTH CARE EDUCATION/TRAINING PROGRAM

## 2025-05-06 RX ORDER — AZITHROMYCIN 200 MG/5ML
POWDER, FOR SUSPENSION ORAL
Qty: 37.7 ML | Refills: 0 | Status: SHIPPED | OUTPATIENT
Start: 2025-05-06 | End: 2025-05-12

## 2025-05-06 NOTE — PROGRESS NOTES
"Name: Trina Darby      : 1995      MRN: [id number]  Encounter Provider: Laura Mchugh MD  Encounter Date: 2025   Encounter department: [facility]  :  Assessment & Plan  Upper respiratory tract infection, unspecified type    Orders:  •  azithromycin (ZITHROMAX) 200 mg/5 mL suspension; Take 12.5 mL (500 mg total) by mouth daily for 1 day, THEN 6.3 mL (250 mg total) daily for 4 days.     Increase fluid intake as tolerated  Antibiotic for full course  Flonase OTC 1-2 sprays each nostril daily PRN for postnasal drip  RTO in 1 week if symptoms persist or worsen         History of Present Illness   HPI    Patient presents with sinus congestion, cough, and sore throat for about 2 days.  She has been taking Mucinex for symptoms but notes this is not helping.  She does have seasonal allergies.  She notes that she tends to have sinus infections often.    Review of Systems   Constitutional:  Negative for activity change, appetite change, chills, fatigue and fever.   HENT:  Positive for congestion, postnasal drip, rhinorrhea and sore throat.    Respiratory:  Positive for cough.    Gastrointestinal:  Negative for nausea and vomiting.   Musculoskeletal:  Negative for back pain.   Skin:  Negative for rash.   Neurological:  Negative for light-headedness and headaches.       Objective   /70   Pulse (!) 120   Temp 98.2 °F (36.8 °C) (Temporal)   Resp 20   Ht 5' 4.5\" (1.638 m)   Wt 96.6 kg (213 lb)   LMP 2025 (Approximate)   SpO2 98%   BMI 36.00 kg/m²      Physical Exam  Constitutional:       Appearance: Normal appearance.   HENT:      Head: Normocephalic and atraumatic.      Right Ear: Tympanic membrane, ear canal and external ear normal.      Left Ear: Tympanic membrane, ear canal and external ear normal.      Nose: Congestion present.      Mouth/Throat:      Pharynx: Posterior oropharyngeal erythema present.   Cardiovascular:      Rate and Rhythm: Normal rate and regular " rhythm.      Pulses: Normal pulses.      Heart sounds: Normal heart sounds.   Neurological:      General: No focal deficit present.      Mental Status: She is alert and oriented to person, place, and time.

## 2025-05-06 NOTE — LETTER
May 6, 2025     Patient: Trina Darby  YOB: 1995  Date of Visit: 5/6/2025      To Whom it May Concern:    Trina Darby is under my professional care. Trina was seen in my office on 5/6/2025. Trina may return to work on 5/8/25.    If you have any questions or concerns, please don't hesitate to call.         Sincerely,          Laura Mchugh MD        CC: No Recipients

## 2025-05-08 ENCOUNTER — OFFICE VISIT (OUTPATIENT)
Dept: FAMILY MEDICINE CLINIC | Facility: CLINIC | Age: 30
End: 2025-05-08
Payer: COMMERCIAL

## 2025-05-08 VITALS
SYSTOLIC BLOOD PRESSURE: 120 MMHG | HEIGHT: 65 IN | DIASTOLIC BLOOD PRESSURE: 72 MMHG | HEART RATE: 88 BPM | RESPIRATION RATE: 18 BRPM | TEMPERATURE: 98 F | OXYGEN SATURATION: 99 % | WEIGHT: 214.4 LBS | BODY MASS INDEX: 35.72 KG/M2

## 2025-05-08 DIAGNOSIS — R05.1 ACUTE COUGH: Primary | ICD-10-CM

## 2025-05-08 DIAGNOSIS — J98.01 BRONCHOSPASM: ICD-10-CM

## 2025-05-08 LAB
SARS-COV-2 AG UPPER RESP QL IA: NEGATIVE
SL AMB POCT RAPID FLU A: NEGATIVE
SL AMB POCT RAPID FLU B: NEGATIVE
VALID CONTROL: NORMAL

## 2025-05-08 PROCEDURE — 99213 OFFICE O/P EST LOW 20 MIN: CPT | Performed by: FAMILY MEDICINE

## 2025-05-08 PROCEDURE — 87811 SARS-COV-2 COVID19 W/OPTIC: CPT | Performed by: FAMILY MEDICINE

## 2025-05-08 PROCEDURE — 87804 INFLUENZA ASSAY W/OPTIC: CPT | Performed by: FAMILY MEDICINE

## 2025-05-08 PROCEDURE — 96372 THER/PROPH/DIAG INJ SC/IM: CPT

## 2025-05-08 RX ORDER — PREDNISOLONE SODIUM PHOSPHATE 15 MG/5ML
SOLUTION ORAL
Qty: 70 ML | Refills: 0 | Status: SHIPPED | OUTPATIENT
Start: 2025-05-08 | End: 2025-05-12

## 2025-05-08 RX ORDER — DEXAMETHASONE SODIUM PHOSPHATE 4 MG/ML
4 INJECTION, SOLUTION INTRA-ARTICULAR; INTRALESIONAL; INTRAMUSCULAR; INTRAVENOUS; SOFT TISSUE ONCE
Status: COMPLETED | OUTPATIENT
Start: 2025-05-08 | End: 2025-05-08

## 2025-05-08 RX ADMIN — DEXAMETHASONE SODIUM PHOSPHATE 4 MG: 4 INJECTION, SOLUTION INTRA-ARTICULAR; INTRALESIONAL; INTRAMUSCULAR; INTRAVENOUS; SOFT TISSUE at 16:31

## 2025-05-08 NOTE — PATIENT INSTRUCTIONS
CONTINUE CURRENT TREATMENT PLAN  MEDICATION AS DIRECTED - ADD PREDNISONE  HYDRATION / HUMIDIFICATION  MUCINEX OR ROBITUSSIN    CALL / MESSAGE MONDAY WITH UPDATE, SOONER PRN

## 2025-05-08 NOTE — PROGRESS NOTES
Name: Trina Darby      : 1995      MRN: [id number]  Encounter Provider: Jos Saini MD  Encounter Date: 2025   Encounter department: [facility]  :  Assessment & Plan  Acute cough    Orders:  •  POCT Rapid Covid Ag  •  POCT rapid flu A and B    Bronchospasm    Orders:  •  POCT Rapid Covid Ag  •  POCT rapid flu A and B  •  dexamethasone (DECADRON) injection 4 mg  •  prednisoLONE (ORAPRED) 15 mg/5 mL oral solution; TAKE 10 ML PO QD X 4 DAYS THEN 5 ML PO QD X 4 DAYS THEN 2.5 ML PO QD X 4 DAYS           History of Present Illness   Cough  This is a new problem. The current episode started 1 to 4 weeks ago. The problem has been gradually worsening. The problem occurs every few minutes. The cough is Non-productive. Associated symptoms include headaches, myalgias, nasal congestion, postnasal drip, rhinorrhea, a sore throat, shortness of breath and wheezing. Pertinent negatives include no chest pain, chills, ear congestion, ear pain, fever, heartburn, hemoptysis, rash, sweats or weight loss. Nothing aggravates the symptoms. She has tried OTC cough suppressant for the symptoms. The treatment provided no relief.     Review of Systems   Constitutional:  Negative for chills, fever and weight loss.   HENT:  Positive for postnasal drip, rhinorrhea and sore throat. Negative for congestion and ear pain.    Eyes:  Negative for discharge.   Respiratory:  Positive for cough, shortness of breath and wheezing. Negative for hemoptysis and chest tightness.    Cardiovascular:  Negative for chest pain and palpitations.   Gastrointestinal:  Negative for abdominal pain, diarrhea, heartburn, nausea and vomiting.   Musculoskeletal:  Positive for myalgias. Negative for arthralgias and joint swelling.   Skin:  Negative for rash.   Neurological:  Positive for headaches. Negative for numbness.   Psychiatric/Behavioral:  The patient is not nervous/anxious.        Objective   /72   Pulse 88    "Temp 98 °F (36.7 °C)   Resp 18   Ht 5' 4.5\" (1.638 m)   Wt 97.3 kg (214 lb 6.4 oz)   LMP 04/30/2025 (Approximate)   SpO2 99%   BMI 36.23 kg/m²      Physical Exam  Constitutional:       General: She is not in acute distress.     Appearance: Normal appearance. She is obese. She is not ill-appearing.   HENT:      Head: Normocephalic and atraumatic.      Right Ear: Tympanic membrane normal.      Left Ear: Tympanic membrane normal.      Nose: Congestion and rhinorrhea present.      Mouth/Throat:      Mouth: Mucous membranes are moist.   Cardiovascular:      Rate and Rhythm: Normal rate and regular rhythm.   Pulmonary:      Effort: Pulmonary effort is normal.      Breath sounds: Wheezing and rhonchi present.   Abdominal:      General: Abdomen is flat.      Palpations: Abdomen is soft.   Musculoskeletal:         General: Normal range of motion.      Cervical back: Normal range of motion and neck supple. No rigidity or tenderness.   Skin:     General: Skin is warm.      Findings: No rash.   Neurological:      General: No focal deficit present.      Mental Status: She is alert.   Psychiatric:         Mood and Affect: Mood normal.         Thought Content: Thought content normal.         "

## 2025-05-12 ENCOUNTER — OFFICE VISIT (OUTPATIENT)
Dept: FAMILY MEDICINE CLINIC | Facility: CLINIC | Age: 30
End: 2025-05-12
Payer: COMMERCIAL

## 2025-05-12 VITALS
OXYGEN SATURATION: 98 % | DIASTOLIC BLOOD PRESSURE: 80 MMHG | HEIGHT: 65 IN | BODY MASS INDEX: 35.22 KG/M2 | HEART RATE: 79 BPM | TEMPERATURE: 97.6 F | RESPIRATION RATE: 16 BRPM | WEIGHT: 211.4 LBS | SYSTOLIC BLOOD PRESSURE: 144 MMHG

## 2025-05-12 DIAGNOSIS — R79.82 ELEVATED C-REACTIVE PROTEIN (CRP): ICD-10-CM

## 2025-05-12 DIAGNOSIS — R70.0 ELEVATED SED RATE: ICD-10-CM

## 2025-05-12 DIAGNOSIS — F43.23 ADJUSTMENT DISORDER WITH MIXED ANXIETY AND DEPRESSED MOOD: Primary | ICD-10-CM

## 2025-05-12 DIAGNOSIS — J45.991 COUGH VARIANT ASTHMA: ICD-10-CM

## 2025-05-12 PROCEDURE — 99213 OFFICE O/P EST LOW 20 MIN: CPT | Performed by: FAMILY MEDICINE

## 2025-05-12 RX ORDER — BUPROPION HYDROCHLORIDE 100 MG/1
100 TABLET ORAL 2 TIMES DAILY
Start: 2025-05-12

## 2025-05-12 NOTE — PATIENT INSTRUCTIONS
REASSURANCE  HYDRATION  START WELLBUTRIN AS DISCUSSED    CALL / MESSAGE IN 2 WEEKS WITH UPDATE    REPEAT ESR AND CRP IN 1 MONTH

## 2025-05-12 NOTE — ASSESSMENT & PLAN NOTE
DID NOT START WELLBUTRIN    DISCUSSED START UP PROCEDURE  WILL MONITOR SYMPTOMS    Orders:  •  buPROPion (WELLBUTRIN) 100 mg tablet; Take 1 tablet (100 mg total) by mouth 2 (two) times a day

## 2025-05-12 NOTE — PROGRESS NOTES
"Name: Trina Darby      : 1995      MRN: [id number]  Encounter Provider: Jos Saini MD  Encounter Date: 2025   Encounter department: [facility]  :  Assessment & Plan  Adjustment disorder with mixed anxiety and depressed mood    DID NOT START WELLBUTRIN    DISCUSSED START UP PROCEDURE  WILL MONITOR SYMPTOMS    Orders:  •  buPROPion (WELLBUTRIN) 100 mg tablet; Take 1 tablet (100 mg total) by mouth 2 (two) times a day    Cough variant asthma    MUCH IMPROVED         Elevated sed rate    Orders:  •  Sedimentation rate, automated; Future  •  C-reactive protein; Future    Elevated C-reactive protein (CRP)    Orders:  •  Sedimentation rate, automated; Future  •  C-reactive protein; Future           History of Present Illness   FOLLOW UP      Review of Systems   Constitutional:  Negative for fever.   HENT:  Negative for congestion and sore throat.    Eyes:  Negative for discharge.   Respiratory:  Negative for chest tightness.    Cardiovascular:  Negative for chest pain and palpitations.   Gastrointestinal:  Negative for abdominal pain, diarrhea, nausea and vomiting.   Musculoskeletal:  Negative for arthralgias and joint swelling.   Neurological:  Negative for numbness.   Psychiatric/Behavioral:  The patient is nervous/anxious.        Objective   /80   Pulse 79   Temp 97.6 °F (36.4 °C)   Resp 16   Ht 5' 4.5\" (1.638 m)   Wt 95.9 kg (211 lb 6.4 oz)   LMP 2025 (Approximate)   SpO2 98%   BMI 35.73 kg/m²      Physical Exam  Constitutional:       Appearance: She is well-developed.   HENT:      Head: Normocephalic and atraumatic.      Nose: Nose normal.      Mouth/Throat:      Mouth: Mucous membranes are moist.   Eyes:      General:         Right eye: No discharge.         Left eye: No discharge.      Conjunctiva/sclera: Conjunctivae normal.      Pupils: Pupils are equal, round, and reactive to light.   Neck:      Thyroid: No thyromegaly.   Cardiovascular:     "  Rate and Rhythm: Normal rate and regular rhythm.      Heart sounds: Normal heart sounds. No murmur heard.  Pulmonary:      Effort: Pulmonary effort is normal. No respiratory distress.      Breath sounds: Normal breath sounds. No wheezing or rales.   Abdominal:      General: Bowel sounds are normal.      Palpations: Abdomen is soft.      Tenderness: There is no abdominal tenderness.   Musculoskeletal:         General: No tenderness. Normal range of motion.      Cervical back: Normal range of motion and neck supple.   Lymphadenopathy:      Cervical: No cervical adenopathy.   Skin:     General: Skin is warm and dry.      Findings: No erythema or rash.   Neurological:      Mental Status: She is alert and oriented to person, place, and time.   Psychiatric:         Behavior: Behavior normal.         Thought Content: Thought content normal.         Judgment: Judgment normal.

## 2025-06-09 DIAGNOSIS — F43.23 ADJUSTMENT DISORDER WITH MIXED ANXIETY AND DEPRESSED MOOD: ICD-10-CM

## 2025-06-10 RX ORDER — BUPROPION HYDROCHLORIDE 100 MG/1
TABLET ORAL
Qty: 60 TABLET | Refills: 1 | Status: SHIPPED | OUTPATIENT
Start: 2025-06-10 | End: 2025-06-16

## 2025-06-16 DIAGNOSIS — F43.23 ADJUSTMENT DISORDER WITH MIXED ANXIETY AND DEPRESSED MOOD: Primary | ICD-10-CM

## 2025-06-16 RX ORDER — VENLAFAXINE HYDROCHLORIDE 37.5 MG/1
37.5 CAPSULE, EXTENDED RELEASE ORAL
Qty: 30 CAPSULE | Refills: 5 | Status: SHIPPED | OUTPATIENT
Start: 2025-06-16

## 2025-07-09 NOTE — TELEPHONE ENCOUNTER
Internal Medicine Raphael Condon states her rx was on back order so they told her to get Flonase in the meantime  Should she stop that once she picks up the new rx? Jen Cohn states she has 1 more day of zithromax but still has a clogged ear  Should she give it more time? normal/normal affect/alert and oriented x3/normal behavior